# Patient Record
Sex: FEMALE | Race: WHITE | HISPANIC OR LATINO | Employment: OTHER | URBAN - METROPOLITAN AREA
[De-identification: names, ages, dates, MRNs, and addresses within clinical notes are randomized per-mention and may not be internally consistent; named-entity substitution may affect disease eponyms.]

---

## 2023-03-07 ENCOUNTER — APPOINTMENT (OUTPATIENT)
Dept: LAB | Facility: LAB | Age: 73
End: 2023-03-07
Payer: MEDICARE

## 2023-03-07 LAB
ALANINE AMINOTRANSFERASE (SGPT) (U/L) IN SER/PLAS: 17 U/L (ref 7–45)
ALBUMIN (G/DL) IN SER/PLAS: 4.6 G/DL (ref 3.4–5)
ALKALINE PHOSPHATASE (U/L) IN SER/PLAS: 63 U/L (ref 33–136)
ANION GAP IN SER/PLAS: 13 MMOL/L (ref 10–20)
ASPARTATE AMINOTRANSFERASE (SGOT) (U/L) IN SER/PLAS: 20 U/L (ref 9–39)
BILIRUBIN TOTAL (MG/DL) IN SER/PLAS: 0.5 MG/DL (ref 0–1.2)
CALCIDIOL (25 OH VITAMIN D3) (NG/ML) IN SER/PLAS: 46 NG/ML
CALCIUM (MG/DL) IN SER/PLAS: 9.2 MG/DL (ref 8.6–10.6)
CARBON DIOXIDE, TOTAL (MMOL/L) IN SER/PLAS: 30 MMOL/L (ref 21–32)
CHLORIDE (MMOL/L) IN SER/PLAS: 100 MMOL/L (ref 98–107)
CREATININE (MG/DL) IN SER/PLAS: 0.77 MG/DL (ref 0.5–1.05)
ERYTHROCYTE DISTRIBUTION WIDTH (RATIO) BY AUTOMATED COUNT: 13 % (ref 11.5–14.5)
ERYTHROCYTE MEAN CORPUSCULAR HEMOGLOBIN CONCENTRATION (G/DL) BY AUTOMATED: 33.8 G/DL (ref 32–36)
ERYTHROCYTE MEAN CORPUSCULAR VOLUME (FL) BY AUTOMATED COUNT: 99 FL (ref 80–100)
ERYTHROCYTES (10*6/UL) IN BLOOD BY AUTOMATED COUNT: 4.4 X10E12/L (ref 4–5.2)
GFR FEMALE: 81 ML/MIN/1.73M2
GLUCOSE (MG/DL) IN SER/PLAS: 115 MG/DL (ref 74–99)
HEMATOCRIT (%) IN BLOOD BY AUTOMATED COUNT: 43.5 % (ref 36–46)
HEMOGLOBIN (G/DL) IN BLOOD: 14.7 G/DL (ref 12–16)
LEUKOCYTES (10*3/UL) IN BLOOD BY AUTOMATED COUNT: 5.2 X10E9/L (ref 4.4–11.3)
NRBC (PER 100 WBCS) BY AUTOMATED COUNT: 0 /100 WBC (ref 0–0)
PARATHYRIN INTACT (PG/ML) IN SER/PLAS: 104.1 PG/ML (ref 18.5–88)
PLATELETS (10*3/UL) IN BLOOD AUTOMATED COUNT: 189 X10E9/L (ref 150–450)
POTASSIUM (MMOL/L) IN SER/PLAS: 4.7 MMOL/L (ref 3.5–5.3)
PROTEIN TOTAL: 6.2 G/DL (ref 6.4–8.2)
SODIUM (MMOL/L) IN SER/PLAS: 138 MMOL/L (ref 136–145)
THYROTROPIN (MIU/L) IN SER/PLAS BY DETECTION LIMIT <= 0.05 MIU/L: 2.18 MIU/L (ref 0.44–3.98)
UREA NITROGEN (MG/DL) IN SER/PLAS: 25 MG/DL (ref 6–23)

## 2023-03-10 LAB
CREATININE, URINE - PER VOLUME: 108 MG/DL
NTX, URINE (NM BCE/MM CREATININE): 32

## 2023-04-04 LAB
ALANINE AMINOTRANSFERASE (SGPT) (U/L) IN SER/PLAS: 20 U/L (ref 7–45)
ALBUMIN (G/DL) IN SER/PLAS: 4.5 G/DL (ref 3.4–5)
ALKALINE PHOSPHATASE (U/L) IN SER/PLAS: 65 U/L (ref 33–136)
ANION GAP IN SER/PLAS: 13 MMOL/L (ref 10–20)
ASPARTATE AMINOTRANSFERASE (SGOT) (U/L) IN SER/PLAS: 22 U/L (ref 9–39)
BILIRUBIN TOTAL (MG/DL) IN SER/PLAS: 0.5 MG/DL (ref 0–1.2)
CALCIDIOL (25 OH VITAMIN D3) (NG/ML) IN SER/PLAS: 52 NG/ML
CALCIUM (MG/DL) IN SER/PLAS: 9.5 MG/DL (ref 8.6–10.6)
CARBON DIOXIDE, TOTAL (MMOL/L) IN SER/PLAS: 29 MMOL/L (ref 21–32)
CHLORIDE (MMOL/L) IN SER/PLAS: 98 MMOL/L (ref 98–107)
CHOLESTEROL (MG/DL) IN SER/PLAS: 191 MG/DL (ref 0–199)
CHOLESTEROL IN HDL (MG/DL) IN SER/PLAS: 68 MG/DL
CHOLESTEROL/HDL RATIO: 2.8
CREATININE (MG/DL) IN SER/PLAS: 0.71 MG/DL (ref 0.5–1.05)
ERYTHROCYTE DISTRIBUTION WIDTH (RATIO) BY AUTOMATED COUNT: 13.2 % (ref 11.5–14.5)
ERYTHROCYTE MEAN CORPUSCULAR HEMOGLOBIN CONCENTRATION (G/DL) BY AUTOMATED: 33 G/DL (ref 32–36)
ERYTHROCYTE MEAN CORPUSCULAR VOLUME (FL) BY AUTOMATED COUNT: 101 FL (ref 80–100)
ERYTHROCYTES (10*6/UL) IN BLOOD BY AUTOMATED COUNT: 4.41 X10E12/L (ref 4–5.2)
GFR FEMALE: 90 ML/MIN/1.73M2
GLUCOSE (MG/DL) IN SER/PLAS: 80 MG/DL (ref 74–99)
HEMATOCRIT (%) IN BLOOD BY AUTOMATED COUNT: 44.5 % (ref 36–46)
HEMOGLOBIN (G/DL) IN BLOOD: 14.7 G/DL (ref 12–16)
LDL: 97 MG/DL (ref 0–99)
LEUKOCYTES (10*3/UL) IN BLOOD BY AUTOMATED COUNT: 6 X10E9/L (ref 4.4–11.3)
MAGNESIUM (MG/DL) IN SER/PLAS: 2.24 MG/DL (ref 1.6–2.4)
NRBC (PER 100 WBCS) BY AUTOMATED COUNT: 0 /100 WBC (ref 0–0)
PHOSPHATE (MG/DL) IN SER/PLAS: 3.6 MG/DL (ref 2.5–4.9)
PLATELETS (10*3/UL) IN BLOOD AUTOMATED COUNT: 208 X10E9/L (ref 150–450)
POTASSIUM (MMOL/L) IN SER/PLAS: 4.4 MMOL/L (ref 3.5–5.3)
PROTEIN TOTAL: 6.3 G/DL (ref 6.4–8.2)
SODIUM (MMOL/L) IN SER/PLAS: 136 MMOL/L (ref 136–145)
TRIGLYCERIDE (MG/DL) IN SER/PLAS: 132 MG/DL (ref 0–149)
UREA NITROGEN (MG/DL) IN SER/PLAS: 29 MG/DL (ref 6–23)
VLDL: 26 MG/DL (ref 0–40)

## 2023-04-05 LAB — PARATHYRIN INTACT (PG/ML) IN SER/PLAS: 74.8 PG/ML (ref 18.5–88)

## 2023-05-25 LAB
C REACTIVE PROTEIN (MG/L) IN SER/PLAS: <0.1 MG/DL
SEDIMENTATION RATE, ERYTHROCYTE: 3 MM/H (ref 0–30)

## 2023-06-06 ENCOUNTER — HOSPITAL ENCOUNTER (OUTPATIENT)
Dept: DATA CONVERSION | Facility: HOSPITAL | Age: 73
End: 2023-06-06
Attending: OPHTHALMOLOGY | Admitting: OPHTHALMOLOGY
Payer: MEDICARE

## 2023-06-06 DIAGNOSIS — J90 PLEURAL EFFUSION, NOT ELSEWHERE CLASSIFIED: ICD-10-CM

## 2023-06-06 DIAGNOSIS — Z79.82 LONG TERM (CURRENT) USE OF ASPIRIN: ICD-10-CM

## 2023-06-06 DIAGNOSIS — I48.92 UNSPECIFIED ATRIAL FLUTTER (MULTI): ICD-10-CM

## 2023-06-06 DIAGNOSIS — Z79.52 LONG TERM (CURRENT) USE OF SYSTEMIC STEROIDS: ICD-10-CM

## 2023-06-06 DIAGNOSIS — I48.0 PAROXYSMAL ATRIAL FIBRILLATION (MULTI): ICD-10-CM

## 2023-06-06 DIAGNOSIS — I95.9 HYPOTENSION, UNSPECIFIED: ICD-10-CM

## 2023-06-06 DIAGNOSIS — R51.9 HEADACHE, UNSPECIFIED: ICD-10-CM

## 2023-06-06 DIAGNOSIS — M81.0 AGE-RELATED OSTEOPOROSIS WITHOUT CURRENT PATHOLOGICAL FRACTURE: ICD-10-CM

## 2023-06-06 DIAGNOSIS — R70.0 ELEVATED ERYTHROCYTE SEDIMENTATION RATE: ICD-10-CM

## 2023-06-08 LAB
COMPLETE PATHOLOGY REPORT: NORMAL
CONVERTED CLINICAL DIAGNOSIS-HISTORY: NORMAL
CONVERTED FINAL DIAGNOSIS: NORMAL
CONVERTED FINAL REPORT PDF LINK TO COPY AND PASTE: NORMAL
CONVERTED GROSS DESCRIPTION: NORMAL

## 2023-08-25 LAB
C REACTIVE PROTEIN (MG/L) IN SER/PLAS: <0.1 MG/DL
CALCIUM (MG/DL) IN SER/PLAS: 9 MG/DL (ref 8.6–10.6)
SEDIMENTATION RATE, ERYTHROCYTE: <1 MM/H (ref 0–30)

## 2023-09-07 VITALS
DIASTOLIC BLOOD PRESSURE: 70 MMHG | RESPIRATION RATE: 16 BRPM | SYSTOLIC BLOOD PRESSURE: 116 MMHG | HEART RATE: 75 BPM | TEMPERATURE: 98.2 F

## 2023-09-09 PROBLEM — E87.1 HYPONATREMIA: Status: ACTIVE | Noted: 2023-09-09

## 2023-09-09 PROBLEM — I48.91 A-FIB (MULTI): Status: ACTIVE | Noted: 2023-09-09

## 2023-09-09 PROBLEM — D47.3 ESSENTIAL (HEMORRHAGIC) THROMBOCYTHEMIA (MULTI): Status: ACTIVE | Noted: 2023-09-09

## 2023-09-09 PROBLEM — H92.01 OTALGIA, RIGHT: Status: ACTIVE | Noted: 2023-09-09

## 2023-09-09 PROBLEM — H92.09 OTALGIA: Status: ACTIVE | Noted: 2023-09-09

## 2023-09-09 PROBLEM — I48.92 ATRIAL FLUTTER (MULTI): Status: ACTIVE | Noted: 2023-09-09

## 2023-09-09 PROBLEM — I48.0 PAROXYSMAL ATRIAL FIBRILLATION (MULTI): Status: ACTIVE | Noted: 2023-09-09

## 2023-09-09 PROBLEM — K60.2 ANAL FISSURE: Status: ACTIVE | Noted: 2023-09-09

## 2023-09-09 PROBLEM — M99.05 SEGMENTAL AND SOMATIC DYSFUNCTION OF PELVIC REGION: Status: ACTIVE | Noted: 2023-09-09

## 2023-09-09 PROBLEM — S82.002A FRACTURE OF LEFT PATELLA: Status: ACTIVE | Noted: 2023-09-09

## 2023-09-09 PROBLEM — R91.1 PULMONARY NODULE: Status: ACTIVE | Noted: 2023-09-09

## 2023-09-09 PROBLEM — M54.59 MECHANICAL LOW BACK PAIN: Status: ACTIVE | Noted: 2023-09-09

## 2023-09-09 PROBLEM — R71.8 ELEVATED MCV: Status: ACTIVE | Noted: 2023-09-09

## 2023-09-09 PROBLEM — M81.0 OSTEOPOROSIS WITHOUT CURRENT PATHOLOGICAL FRACTURE: Status: ACTIVE | Noted: 2023-09-09

## 2023-09-09 PROBLEM — Z04.9 CONDITION NOT FOUND: Status: ACTIVE | Noted: 2023-09-09

## 2023-09-09 PROBLEM — T63.441A ALLERGIC REACTION TO BEE STING: Status: ACTIVE | Noted: 2023-09-09

## 2023-09-09 PROBLEM — S61.213A LACERATION OF LEFT MIDDLE FINGER WITHOUT FOREIGN BODY WITHOUT DAMAGE TO NAIL: Status: ACTIVE | Noted: 2023-09-09

## 2023-09-09 PROBLEM — R13.14 PHARYNGOESOPHAGEAL DYSPHAGIA: Status: ACTIVE | Noted: 2023-09-09

## 2023-09-09 PROBLEM — M19.90 ARTHRITIS: Status: ACTIVE | Noted: 2023-09-09

## 2023-09-09 PROBLEM — K21.9 ESOPHAGEAL REFLUX: Status: ACTIVE | Noted: 2023-09-09

## 2023-09-09 PROBLEM — R00.2 HEART PALPITATIONS: Status: ACTIVE | Noted: 2023-09-09

## 2023-09-09 PROBLEM — M15.9 OSTEOARTHRITIS, MULTIPLE SITES: Status: ACTIVE | Noted: 2023-09-09

## 2023-09-09 PROBLEM — R19.5 DARK STOOLS: Status: ACTIVE | Noted: 2023-09-09

## 2023-09-09 PROBLEM — J45.909 ALLERGIC BRONCHITIS (HHS-HCC): Status: ACTIVE | Noted: 2023-09-09

## 2023-09-09 PROBLEM — R63.4 WEIGHT LOSS: Status: ACTIVE | Noted: 2023-09-09

## 2023-09-09 PROBLEM — M41.9 SCOLIOSIS: Status: ACTIVE | Noted: 2023-09-09

## 2023-09-09 PROBLEM — M16.12 PRIMARY LOCALIZED OSTEOARTHRITIS OF LEFT HIP: Status: ACTIVE | Noted: 2023-09-09

## 2023-09-09 PROBLEM — Z95.9 PRESENCE OF CARDIAC AND VASCULAR IMPLANT AND GRAFT: Status: ACTIVE | Noted: 2023-09-09

## 2023-09-09 PROBLEM — K66.1 PERITONEAL HEMATOMA: Status: ACTIVE | Noted: 2023-09-09

## 2023-09-09 PROBLEM — E78.1 HIGH TRIGLYCERIDES: Status: ACTIVE | Noted: 2023-09-09

## 2023-09-09 PROBLEM — R74.8 ELEVATED LIVER ENZYMES: Status: ACTIVE | Noted: 2023-09-09

## 2023-09-09 PROBLEM — R53.83 FATIGUE: Status: ACTIVE | Noted: 2023-09-09

## 2023-09-09 PROBLEM — R09.89 ABNORMAL FINDING OF LUNG: Status: ACTIVE | Noted: 2023-09-09

## 2023-09-09 PROBLEM — R79.89 ABNORMAL LIVER FUNCTION TEST: Status: ACTIVE | Noted: 2023-09-09

## 2023-09-09 PROBLEM — I74.10 AORTIC THROMBUS (MULTI): Status: ACTIVE | Noted: 2023-09-09

## 2023-09-09 PROBLEM — N95.1 VAGINAL DRYNESS, MENOPAUSAL: Status: ACTIVE | Noted: 2023-09-09

## 2023-09-09 PROBLEM — I47.20 VENTRICULAR TACHYCARDIA (MULTI): Status: ACTIVE | Noted: 2023-09-09

## 2023-09-09 PROBLEM — R82.90 URINE ABNORMALITY: Status: ACTIVE | Noted: 2023-09-09

## 2023-09-09 PROBLEM — R79.89 LOW VITAMIN D LEVEL: Status: ACTIVE | Noted: 2023-09-09

## 2023-09-09 PROBLEM — T63.461A WASP STING: Status: ACTIVE | Noted: 2023-09-09

## 2023-09-09 PROBLEM — N95.9 MENOPAUSAL AND POSTMENOPAUSAL DISORDER: Status: ACTIVE | Noted: 2023-09-09

## 2023-09-09 PROBLEM — L50.0 ALLERGIC URTICARIA: Status: ACTIVE | Noted: 2023-09-09

## 2023-09-09 PROBLEM — K08.9 DENTAL DISORDER: Status: ACTIVE | Noted: 2023-09-09

## 2023-09-09 PROBLEM — E78.00 ELEVATED LDL CHOLESTEROL LEVEL: Status: ACTIVE | Noted: 2023-09-09

## 2023-09-09 PROBLEM — N28.9 ABNORMAL RENAL FUNCTION: Status: ACTIVE | Noted: 2023-09-09

## 2023-09-09 RX ORDER — MULTIVITAMIN/IRON/FOLIC ACID 18MG-0.4MG
1 TABLET ORAL DAILY
COMMUNITY
Start: 2020-09-16

## 2023-09-09 RX ORDER — MUPIROCIN 20 MG/G
OINTMENT TOPICAL
COMMUNITY
Start: 2023-03-29

## 2023-09-09 RX ORDER — FLUTICASONE PROPIONATE 50 MCG
2 SPRAY, SUSPENSION (ML) NASAL DAILY
COMMUNITY
Start: 2018-02-07

## 2023-09-09 RX ORDER — ESTRADIOL 0.1 MG/G
CREAM VAGINAL
COMMUNITY
Start: 2022-08-23

## 2023-09-09 RX ORDER — PREDNISONE 2.5 MG/1
3 TABLET ORAL DAILY
COMMUNITY
End: 2023-10-13 | Stop reason: ALTCHOICE

## 2023-09-09 RX ORDER — CALCIUM CARBONATE 600 MG
1 TABLET ORAL DAILY
COMMUNITY
Start: 2017-04-26

## 2023-09-09 RX ORDER — FLUTICASONE PROPIONATE 0.5 MG/G
CREAM TOPICAL AS NEEDED
COMMUNITY
Start: 2021-07-27

## 2023-09-09 RX ORDER — NITROGLYCERIN 0.4 MG/1
0.4 TABLET SUBLINGUAL EVERY 5 MIN PRN
COMMUNITY

## 2023-09-09 RX ORDER — PREDNISONE 10 MG/1
6 TABLET ORAL
COMMUNITY
Start: 2023-06-15 | End: 2023-10-13 | Stop reason: ALTCHOICE

## 2023-09-09 RX ORDER — CHOLECALCIFEROL (VITAMIN D3) 25 MCG
TABLET ORAL
COMMUNITY
Start: 2017-04-26

## 2023-09-09 RX ORDER — ASPIRIN 81 MG/1
1 TABLET ORAL DAILY
COMMUNITY
Start: 2017-04-26

## 2023-09-30 NOTE — H&P
History of Present Illness:   History Present Illness:  Reason for surgery: Rule-Out GCA   HPI:    Patient is a 73 year old female presenting for temporal artery biopsy of both sides for giant cell arteritis rule out.     Allergies:        Allergies:  ·  NKDA :   ·  Bee  Stings: Unknown       Adverse Events:  ·  NSAIDs : Unknown    Home Medication Review:   Home Medications Reviewed: yes     Impression/Procedure:   ·  Impression and Planned Procedure: Bilateral temporal artery biopsy       ERAS (Enhanced Recovery After Surgery):  ·  ERAS Patient: no       Vital Signs:  Temperature C: 36.8 degrees C   Temperature F: 98.2 degrees F   Heart Rate: 75 beats per minute   Respiratory Rate: 16 breath per minute   Blood Pressure Systolic: 116 mm/Hg   Blood Pressure Diastolic: 70 mm/Hg     Physical Exam by System:    Constitutional: Well developed, awake/alert/oriented  x3, no distress, alert and cooperative   Eyes: PERRL, clear sclera   Head/Neck: Neck supple, trachea midline, no bruits   Respiratory/Thorax: Patent airways, CTAB, normal  breath sounds with good chest expansion, thorax symmetric   Cardiovascular: Regular, rate and rhythm   Gastrointestinal: Nondistended, soft   Extremities: normal extremities   Neurological: alert and oriented x3   Psychological: Appropriate mood and behavior   Skin: Warm and dry     Consent:   COVID-19 Consent:  ·  COVID-19 Risk Consent Surgeon has reviewed key risks related to the risk of alan COVID-19 and if they contract COVID-19 what the risks are.     Attestation:   Note Completion:  I am a:  Resident/Fellow   Attending Attestation I saw and evaluated the patient.  I personally obtained the key and critical portions of the history and physical exam or was physically present for key and  critical portions performed by the resident/fellow. I reviewed the resident/fellow?s documentation and discussed the patient with the resident/fellow.  I agree with the resident/fellow?s  medical decision making as documented in the note.     I personally evaluated the patient on 06-Jun-2023         Electronic Signatures:  Ramez Walton)  (Signed 06-Jun-2023 08:08)   Authored: Note Completion   Co-Signer: History of Present Illness, Allergies, Home Medication Review, Impression/Procedure, ERAS, Physical Exam, Consent, Note Completion  Min Veronica (Resident))  (Signed 06-Jun-2023 07:05)   Authored: History of Present Illness, Allergies, Home  Medication Review, Impression/Procedure, ERAS, Physical Exam, Consent, Note Completion      Last Updated: 06-Jun-2023 08:08 by Ramez Walton)

## 2023-10-02 NOTE — OP NOTE
Post Operative Note:     PreOp Diagnosis: Headache, elevated ESR   Post-Procedure Diagnosis: Headache, elevated ESR   Procedure: 1. Right temporal artery biopsy   Surgeon: Ramez Walton MD   Resident/Fellow/Other Assistant: Min Winter MD   Anesthesia: MAC   Estimated Blood Loss (mL): 2   Specimen: yes. R temporal artery   Complications: None   Findings: Right temporal artery     Operative Report Dictated:  Dictation: not applicable - note contains Operative  Report   Operative Report:    The patient was taken to the operating room and placed on the table in the supine position. A doppler was used to plot the path of the  temporal artery. This was marked. The surrounding hair was shaved. MAC anesthesia was induced. 2% lidocaine with epinephrine and 0.5% marcaine in a 1:1 fashion was injected over the surgical site and the patient was prepped and draped in the usual manner  for orbitofacial surgery.     A 15 Bard Gaurang blade incision was made through the skin overlying the Right temporal artery. Sharp dissection was carried down to  the temporal artery, and the temporal artery was bluntly undermined superiorly and inferiorly. The artery was ligated with 4-0 silk sutures superiorly and inferiorly along with any branches of the vessel. A  2.8 cm strip of artery was excised with sharp dissection. The incision was closed with 5-0 Mono cryl sutures deeply and 5-0 chromic gut sutures superficially.  Antibiotic ophthalmic ointment was placed over the surgical site.     The patient was then awakened and taken from the operating room in good condition, having tolerated the procedure well. There were no complications, and the estimated blood loss was less than 5mL          Attestation:   Note Completion:  Attending Attestation I was present for the entire procedure         Electronic Signatures:  Ramez Walton)  (Signed 20-Jun-2023 09:03)   Authored: Post Operative Note, Note Completion      Last Updated: 20-Jun-2023  09:03 by Ramez Walton)

## 2023-10-06 ENCOUNTER — HOSPITAL ENCOUNTER (OUTPATIENT)
Dept: CARDIOLOGY | Facility: CLINIC | Age: 73
Discharge: HOME | End: 2023-10-06
Payer: MEDICARE

## 2023-10-06 DIAGNOSIS — I48.19 PERSISTENT ATRIAL FIBRILLATION (MULTI): ICD-10-CM

## 2023-10-12 NOTE — PROGRESS NOTES
"I had the pleasure seeing Javier Calderón     Chief Complaint   Patient presents with    Atrial Flutter     She is here for a 1 yr follow-up.          Javier Calderón is a 73 y.o. with:     1. Chronic chest pain - presented in February of 2018 at [facility] for chest pain work up. He stress echo was negative. However, prior to the stress test she developed episode of atrial fibrillation/flutter that was short lived  2. Long standing history of palpitations - s/p loop recorder 02/09/2018     Since the loop recorder implant she did have some palpations and initially the transmissions have not shown any abnormal rhythm.      However, recently she had very severe symptoms of sweating, palpitations, impending doom (\"I can not live like that\"). Loop recorder shows they are correlating with AF. She was in [place] and had 4 hour long episode of AF.      On 06/12/2018 she had PVI. During the procedure she became hypotensive. There was no significant pericardial effusion (she had small one before the procedure started), nor groin swelling. She was transfused empirically because her hypotension persisted. 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 She did required transfusion of a significant amount of blood.      July 2019: She has no more AF.  We will continue to monitor her with the loop recorder.      Sept 2022:  loop recorder. Replaced (May 2021).  No episodes of atrial fibrillation.     TESTING         -Echo:  (June 2018) LVEF 60-65%.  Normal function    Objective   Physical Exam  BP 91/55 (BP Location: Left arm, Patient Position: Sitting)   Pulse 72   Ht 1.626 m (5' 4\")   Wt 51.7 kg (114 lb)   SpO2 98%   BMI 19.57 kg/m²     General Appearance:  Alert, cooperative, no distress, appears stated age   Head:  Normocephalic, without obvious abnormality, atraumatic   Eyes:  PERRL, conjunctiva/corneas " clear, EOM's intact, fundi benign, both eyes   Ears:  Normal TM's and external ear canals, both ears   Nose: Nares normal, septum midline,mucosa normal, no drainage or sinus tenderness   Throat: Lips, mucosa, and tongue normal; teeth and gums normal   Neck: Supple, symmetrical, trachea midline, no adenopathy;  thyroid: not enlarged, symmetric, no tenderness/mass/nodules; no carotid bruit or JVD   Back:   Symmetric, no curvature, ROM normal, no CVA tenderness   Lungs:   Clear to auscultation bilaterally, respirations unlabored   Breasts:  No masses or tenderness   Heart:  Regular rate and rhythm, S1 and S2 normal, no murmur, rub, or gallop   Abdomen:   Soft, non-tender, bowel sounds active all four quadrants,  no masses, no organomegaly   Pelvic: Deferred   Extremities: Extremities normal, atraumatic, no cyanosis or edema   Pulses: 2+ and symmetric   Skin: Skin color, texture, turgor normal, no rashes or lesions   Lymph nodes: Cervical, supraclavicular, and axillary nodes normal   Neurologic: Normal          Assessment/Plan   Paroxysmal atrial fibrillation (CMS/HCC)  She is doing very well. Has no more atrial fibrillation. Loop recorder also shows no more episodes of atrial fibrillation. We will continue to monitor through the loop recorder and change the loop recorder when the battery expires.

## 2023-10-13 ENCOUNTER — OFFICE VISIT (OUTPATIENT)
Dept: CARDIOLOGY | Facility: HOSPITAL | Age: 73
End: 2023-10-13
Payer: MEDICARE

## 2023-10-13 VITALS
HEART RATE: 72 BPM | BODY MASS INDEX: 19.46 KG/M2 | SYSTOLIC BLOOD PRESSURE: 91 MMHG | OXYGEN SATURATION: 98 % | HEIGHT: 64 IN | WEIGHT: 114 LBS | DIASTOLIC BLOOD PRESSURE: 55 MMHG

## 2023-10-13 DIAGNOSIS — I48.4 ATYPICAL ATRIAL FLUTTER (MULTI): ICD-10-CM

## 2023-10-13 PROCEDURE — 93005 ELECTROCARDIOGRAM TRACING: CPT | Performed by: INTERNAL MEDICINE

## 2023-10-13 PROCEDURE — 99213 OFFICE O/P EST LOW 20 MIN: CPT | Performed by: INTERNAL MEDICINE

## 2023-10-13 PROCEDURE — 1126F AMNT PAIN NOTED NONE PRSNT: CPT | Performed by: INTERNAL MEDICINE

## 2023-10-13 PROCEDURE — 1159F MED LIST DOCD IN RCRD: CPT | Performed by: INTERNAL MEDICINE

## 2023-10-13 ASSESSMENT — ENCOUNTER SYMPTOMS
OCCASIONAL FEELINGS OF UNSTEADINESS: 0
DEPRESSION: 0
LOSS OF SENSATION IN FEET: 0

## 2023-10-13 ASSESSMENT — COLUMBIA-SUICIDE SEVERITY RATING SCALE - C-SSRS
6. HAVE YOU EVER DONE ANYTHING, STARTED TO DO ANYTHING, OR PREPARED TO DO ANYTHING TO END YOUR LIFE?: NO
2. HAVE YOU ACTUALLY HAD ANY THOUGHTS OF KILLING YOURSELF?: NO
1. IN THE PAST MONTH, HAVE YOU WISHED YOU WERE DEAD OR WISHED YOU COULD GO TO SLEEP AND NOT WAKE UP?: NO

## 2023-10-13 ASSESSMENT — PATIENT HEALTH QUESTIONNAIRE - PHQ9
2. FEELING DOWN, DEPRESSED OR HOPELESS: NOT AT ALL
1. LITTLE INTEREST OR PLEASURE IN DOING THINGS: NOT AT ALL
SUM OF ALL RESPONSES TO PHQ9 QUESTIONS 1 AND 2: 0

## 2023-10-13 NOTE — ASSESSMENT & PLAN NOTE
She is doing very well. Has no more atrial fibrillation. Loop recorder also shows no more episodes of atrial fibrillation. We will continue to monitor through the loop recorder and change the loop recorder when the battery expires.

## 2023-10-17 ENCOUNTER — APPOINTMENT (OUTPATIENT)
Dept: UROLOGY | Facility: CLINIC | Age: 73
End: 2023-10-17
Payer: MEDICARE

## 2023-10-18 ENCOUNTER — TELEPHONE (OUTPATIENT)
Dept: GASTROENTEROLOGY | Facility: CLINIC | Age: 73
End: 2023-10-18
Payer: MEDICARE

## 2023-10-18 NOTE — TELEPHONE ENCOUNTER
Pt had previous colonoscopy she believes at 65.  No record with the clinic. She believes she is due. Please advise if I should schedule her.

## 2023-10-20 ENCOUNTER — HOSPITAL ENCOUNTER (OUTPATIENT)
Dept: CARDIOLOGY | Facility: CLINIC | Age: 73
Discharge: HOME | End: 2023-10-20
Payer: MEDICARE

## 2023-10-20 ENCOUNTER — TELEPHONE (OUTPATIENT)
Dept: CARDIOLOGY | Facility: HOSPITAL | Age: 73
End: 2023-10-20
Payer: MEDICARE

## 2023-10-20 DIAGNOSIS — I48.19 PERSISTENT ATRIAL FIBRILLATION (MULTI): ICD-10-CM

## 2023-10-20 NOTE — TELEPHONE ENCOUNTER
"Message History      Sent: 9/15/2023 9:22:52 AM Eastern Standard Time   Sender: Erick Prado MD   Subject: RE: Charles Pablo.   1950   Body: Good morning Charles,       This is the recommendation:    If chest pain occurs, take 1 sublingual nitro,    Wait 5 minutes,    If pain has not resolved take another nitro and call 911,    Wait 5 minutes,    If pain has not resolved you make take a third (final) nitro.       We would like to know if you are experiencing chest pain, even if it is easily relieved by nitro.    If you have any additional questions feel free to call me at [phone]         Have a nice day,    Thompson Car R.N.      Sent: 2023 2:08:13 PM Eastern Standard Time   Sender: CHARLES PABLO   Subject: RE: Stephane Charles.   1950   Body: Good day Thank you for your time yesterday. I have a question about the protocol if I have chest pain. Do you prefer I take  a nitroglycerin and wait five minutes and if I'm still in pain take one more nitroglycerin and wait five minutes and if I'm still in pain call 911 or  call 911 stat also taking nitroglycerin and an aspirin?  Sorry I didn't think of this while I was with you.         Call 10/20/23  Patient left message reporting that she experienced a\"stabby\"  chest pain last night that was relieved with an ASA and sublingual nitroglycerin. She states the pain was resolved within 5 minutes.     Per Dr. Prado,   Determine if nature of the pain is cardiac, instruct that if pain does not resolve within 5 minutes after taking a nitroglycerin, she should call EMS.     Unable to contact patient. Message left requesting return call and providing instructions on what to do over the weekend should chest pain occur again. Encouraged patient to call us Monday to update on status.     "

## 2023-10-23 ENCOUNTER — HOSPITAL ENCOUNTER (OUTPATIENT)
Dept: CARDIOLOGY | Facility: CLINIC | Age: 73
Discharge: HOME | End: 2023-10-23
Payer: MEDICARE

## 2023-10-23 DIAGNOSIS — I48.19 PERSISTENT ATRIAL FIBRILLATION (MULTI): ICD-10-CM

## 2023-10-24 DIAGNOSIS — Z12.11 SPECIAL SCREENING FOR MALIGNANT NEOPLASMS, COLON: ICD-10-CM

## 2023-10-24 RX ORDER — POLYETHYLENE GLYCOL 3350, SODIUM SULFATE ANHYDROUS, SODIUM BICARBONATE, SODIUM CHLORIDE, POTASSIUM CHLORIDE 236; 22.74; 6.74; 5.86; 2.97 G/4L; G/4L; G/4L; G/4L; G/4L
POWDER, FOR SOLUTION ORAL
Qty: 4000 ML | Refills: 0 | Status: SHIPPED | OUTPATIENT
Start: 2023-10-24 | End: 2023-12-19 | Stop reason: ALTCHOICE

## 2023-10-25 ENCOUNTER — HOSPITAL ENCOUNTER (OUTPATIENT)
Dept: CARDIOLOGY | Facility: CLINIC | Age: 73
Discharge: HOME | End: 2023-10-25
Payer: MEDICARE

## 2023-10-25 DIAGNOSIS — I48.19 ATRIAL FIBRILLATION, PERSISTENT (MULTI): ICD-10-CM

## 2023-10-25 PROCEDURE — 93298 REM INTERROG DEV EVAL SCRMS: CPT | Performed by: INTERNAL MEDICINE

## 2023-11-03 ENCOUNTER — HOSPITAL ENCOUNTER (OUTPATIENT)
Dept: CARDIOLOGY | Facility: CLINIC | Age: 73
Discharge: HOME | End: 2023-11-03
Payer: MEDICARE

## 2023-11-03 DIAGNOSIS — I48.19 ATRIAL FIBRILLATION, PERSISTENT (MULTI): ICD-10-CM

## 2023-11-10 ENCOUNTER — OFFICE VISIT (OUTPATIENT)
Dept: RHEUMATOLOGY | Facility: CLINIC | Age: 73
End: 2023-11-10
Payer: MEDICARE

## 2023-11-10 VITALS
WEIGHT: 114 LBS | DIASTOLIC BLOOD PRESSURE: 64 MMHG | OXYGEN SATURATION: 98 % | SYSTOLIC BLOOD PRESSURE: 100 MMHG | HEART RATE: 80 BPM | BODY MASS INDEX: 19.46 KG/M2 | HEIGHT: 64 IN

## 2023-11-10 DIAGNOSIS — M19.90 ARTHRITIS: ICD-10-CM

## 2023-11-10 DIAGNOSIS — M81.0 AGE-RELATED OSTEOPOROSIS WITHOUT CURRENT PATHOLOGICAL FRACTURE: Primary | ICD-10-CM

## 2023-11-10 PROCEDURE — 1159F MED LIST DOCD IN RCRD: CPT | Performed by: INTERNAL MEDICINE

## 2023-11-10 PROCEDURE — 99213 OFFICE O/P EST LOW 20 MIN: CPT | Performed by: INTERNAL MEDICINE

## 2023-11-10 PROCEDURE — 1036F TOBACCO NON-USER: CPT | Performed by: INTERNAL MEDICINE

## 2023-11-10 PROCEDURE — 1126F AMNT PAIN NOTED NONE PRSNT: CPT | Performed by: INTERNAL MEDICINE

## 2023-11-10 ASSESSMENT — COLUMBIA-SUICIDE SEVERITY RATING SCALE - C-SSRS
6. HAVE YOU EVER DONE ANYTHING, STARTED TO DO ANYTHING, OR PREPARED TO DO ANYTHING TO END YOUR LIFE?: NO
1. IN THE PAST MONTH, HAVE YOU WISHED YOU WERE DEAD OR WISHED YOU COULD GO TO SLEEP AND NOT WAKE UP?: NO
2. HAVE YOU ACTUALLY HAD ANY THOUGHTS OF KILLING YOURSELF?: NO

## 2023-11-10 ASSESSMENT — PATIENT HEALTH QUESTIONNAIRE - PHQ9
SUM OF ALL RESPONSES TO PHQ9 QUESTIONS 1 AND 2: 0
1. LITTLE INTEREST OR PLEASURE IN DOING THINGS: NOT AT ALL
2. FEELING DOWN, DEPRESSED OR HOPELESS: NOT AT ALL

## 2023-11-10 NOTE — PATIENT INSTRUCTIONS
Continue calcium and vitamin D.  Schedule DEXA scan in January 2024.  Follow-up after the DEXA scan.

## 2023-11-10 NOTE — PROGRESS NOTES
"Subjective   Patient ID: Javier Calderón is a 73 y.o. female who presents for Follow-up (Pt is here for F/U).    HPI.  73-year-old female with history of osteoporosis, OA, C-spine DJD with foraminal stenosis and A-fib s/p ablation presented for follow-up.    She reports normal neck pain.  Months antibiotic feels headache.  Prednisone tapered to discontinued about 3 weeks ago.      No recent fall or fractures.  Take calcium and vitamin D.    Past immunosuppression: Prednisone.     -Right temporal artery biopsy obtained on June 6 is negative.     -Past antiresorptive therapy: Reclast, 3/2018, 4/2019 and 2/ 2020.     DEXA scan obtained on January 25, 2022 at [facility] showed L1-L4 T score of -1.3, decreased from -1.1, right femoral neck T score of -2.5 dropped from -2.4 and right total hip T score of -2.2 which is stable.       Review of Systems   All other systems reviewed and are negative.    Objective .      5/9/2023    10:51 AM 5/23/2023    11:04 AM 6/6/2023     7:04 AM 6/16/2023     2:19 PM 7/11/2023    10:47 AM 10/13/2023    10:58 AM 11/10/2023    10:14 AM   Vitals   Systolic  113 116 117 115 91 100   Diastolic  62 70 62 62 55 64   Heart Rate  79 75   72 80   Temp   36.8 °C (98.2 °F)       Resp  16 16       Height (in) 1.626 m (5' 4\") 1.626 m (5' 4\")  1.626 m (5' 4\")  1.626 m (5' 4\") 1.626 m (5' 4\")   Weight (lb) 112 112   111 114 114   BMI 19.22 kg/m2 19.22 kg/m2  19.22 kg/m2 19.05 kg/m2 19.57 kg/m2 19.57 kg/m2   BSA (m2) 1.51 m2 1.51 m2  1.51 m2 1.51 m2 1.53 m2 1.53 m2   Visit Report      Report Report      Physical Exam.  Gen. AAO x3, NAD.  HEENT: No pallor or icterus, PERRLA, EOMI. No cervical lymphadenopathy .  Skin: No rashes.  Heart: S1, S2/ RRR.   Lungs: CTA B.  Abdomen: Soft, NT/ND.  MSK: No.swelling or tenderness of the  upper or lower extremity joints with full ROM, Neck,spine and Navi SI with out tenderness.  Neuro: Sensation to touch intact.Strength 5/5 throughout.   Psych:Appropriate mood " and behavior  EXT: No edema    Assessment/Plan .  73-year-old female with history of osteoporosis, OA, C-spine DJD with foraminal stenosis and A-fib s/p ablation presented for follow-up.    #1: Cervical spine DJD with foraminal stenosis.  Initially it was thought she may have GCA and treated with prednisone.  However right temporal artery was negative.  MRI showed cervical spine degenerative changes and foraminal stenosis.  Symptoms improved with neck traction.  She is off the prednisone and feeling fine.    #2: Postmenopausal osteoporosis.  Previously treated with Reclast x3.  She has completed 4 Prolia injections.  Last injection given in September 2023.  -Obtain DEXA scan in January 2024  -Continue calcium and vitamin D.    Follow-up in 3 months.     This note was partially generated using the Dragon Voice recognition system. There may be some incorrect wording, spelling and/or spelling errors or punctuation errors that were not corrected prior to committing the note to the medical record.    Patient Active Problem List   Diagnosis    Abnormal finding of lung    Abnormal renal function    Allergic bronchitis    Allergic reaction to bee sting    Allergic urticaria    Anal fissure    Aortic thrombus (CMS/HCC)    Arthritis    A-fib (CMS/HCC)    Atrial flutter (CMS/HCC)    Paroxysmal atrial fibrillation (CMS/HCC)    Dark stools    Dental disorder    Elevated LDL cholesterol level    Elevated liver enzymes    Elevated MCV    Esophageal reflux    Essential (hemorrhagic) thrombocythemia (CMS/HCC)    Fatigue    Fracture of left patella    Heart palpitations    High triglycerides    Hyponatremia    Laceration of left middle finger without foreign body without damage to nail    Abnormal liver function test    Low vitamin D level    Mechanical low back pain    Menopausal and postmenopausal disorder    Osteoarthritis, multiple sites    Osteoporosis without current pathological fracture    Otalgia, right    Otalgia     Peritoneal hematoma    Pharyngoesophageal dysphagia    Presence of cardiac and vascular implant and graft    Primary localized osteoarthritis of left hip    Pulmonary nodule    Scoliosis    Segmental and somatic dysfunction of pelvic region    Urine abnormality    Vaginal dryness, menopausal    Ventricular tachycardia (CMS/HCC)    Wasp sting    Weight loss    Condition not found      Past Surgical History:   Procedure Laterality Date    CATARACT EXTRACTION  01/08/2018    Cataract Surgery    CT ABDOMEN PELVIS ANGIOGRAM W AND/OR WO IV CONTRAST  6/12/2018    CT ABDOMEN PELVIS ANGIOGRAM W AND/OR WO IV CONTRAST 6/12/2018 [facility]    CT ABDOMEN PELVIS ANGIOGRAM W AND/OR WO IV CONTRAST  6/14/2018    CT ABDOMEN PELVIS ANGIOGRAM W AND/OR WO IV CONTRAST 6/14/2018 [facility]    IR ANGIOGRAM INFERIOR EPIGASTRIC PELVIC  6/14/2018    IR ANGIOGRAM INFERIOR EPIGASTRIC PELVIC 6/14/2018 [facility]    IR EMBOLIZATION LYMPH NODE Bilateral 6/14/2018    IR EMBOLIZATION LYMPH NODE 6/14/2018 [facility]    IR EMBOLIZATION LYMPH NODE Bilateral 6/14/2018    IR EMBOLIZATION LYMPH NODE 6/14/2018 [facility]    OTHER SURGICAL HISTORY  03/07/2018    Programming And Iterative Adjustment Of Implantable Loop Recorder    OTHER SURGICAL HISTORY  11/08/2022    Esophagogastroduodenoscopy      Social History     Tobacco Use    Smoking status: Never    Smokeless tobacco: Never   Substance Use Topics    Alcohol use: Never      Allergies   Allergen Reactions    Bee Venom Protein (Honey Bee) Unknown      Current Outpatient Medications   Medication Instructions    aspirin 81 mg EC tablet 1 tablet, oral, Daily    b complex 0.4 mg tablet 1 tablet, oral, Daily    calcium carbonate 600 mg calcium (1,500 mg) tablet 1 tablet, oral, Daily    cholecalciferol (Vitamin D-3) 25 MCG (1000 UT) tablet Take as directed.    estradiol (Estrace) 0.01 % (0.1 mg/gram) vaginal cream vaginal, Apply a pea sized amount of vaginal area daily x2 weeks, then three  times a week as needed<BR>    fluticasone (Cutivate) 0.05 % cream Topical, As needed, Apply inch.    fluticasone (Flonase) 50 mcg/actuation nasal spray 2 sprays, Each Nostril, Daily    MULTIVITAMIN ORAL 1 capsule, oral, Daily    mupirocin (Bactroban) 2 % ointment Topical, 3 times daily RT, Apply ointment topically to affected area.    nitroglycerin (NITROSTAT) 0.4 mg, sublingual, Every 5 min PRN, For up to 3 doses as needed. Call 911 if pain persists.    polyethylene glycol-electrolytes (Golytely) 420 gram solution Begin drinking first half of prep 6pm the night before procedure. Start second half 5 hours before procedure time.

## 2023-11-14 ENCOUNTER — LAB (OUTPATIENT)
Dept: LAB | Facility: LAB | Age: 73
End: 2023-11-14
Payer: MEDICARE

## 2023-11-14 DIAGNOSIS — I48.0 PAROXYSMAL ATRIAL FIBRILLATION (MULTI): ICD-10-CM

## 2023-11-14 DIAGNOSIS — M19.90 ARTHRITIS: ICD-10-CM

## 2023-11-14 DIAGNOSIS — E78.00 PURE HYPERCHOLESTEROLEMIA, UNSPECIFIED: Primary | ICD-10-CM

## 2023-11-14 LAB
ALBUMIN SERPL BCP-MCNC: 4.4 G/DL (ref 3.4–5)
ALP SERPL-CCNC: 60 U/L (ref 33–136)
ALT SERPL W P-5'-P-CCNC: 22 U/L (ref 7–45)
ANION GAP SERPL CALC-SCNC: 11 MMOL/L (ref 10–20)
AST SERPL W P-5'-P-CCNC: 24 U/L (ref 9–39)
BILIRUB SERPL-MCNC: 0.6 MG/DL (ref 0–1.2)
BUN SERPL-MCNC: 29 MG/DL (ref 6–23)
CALCIUM SERPL-MCNC: 9.4 MG/DL (ref 8.6–10.6)
CHLORIDE SERPL-SCNC: 102 MMOL/L (ref 98–107)
CHOLEST SERPL-MCNC: 169 MG/DL (ref 0–199)
CHOLESTEROL/HDL RATIO: 2.6
CO2 SERPL-SCNC: 31 MMOL/L (ref 21–32)
CREAT SERPL-MCNC: 0.68 MG/DL (ref 0.5–1.05)
CRP SERPL-MCNC: <0.1 MG/DL
ERYTHROCYTE [SEDIMENTATION RATE] IN BLOOD BY WESTERGREN METHOD: 4 MM/H (ref 0–30)
GFR SERPL CREATININE-BSD FRML MDRD: >90 ML/MIN/1.73M*2
GLUCOSE SERPL-MCNC: 77 MG/DL (ref 74–99)
HDLC SERPL-MCNC: 65.6 MG/DL
LDLC SERPL CALC-MCNC: 86 MG/DL
NON HDL CHOLESTEROL: 103 MG/DL (ref 0–149)
POTASSIUM SERPL-SCNC: 4.7 MMOL/L (ref 3.5–5.3)
PROT SERPL-MCNC: 6.2 G/DL (ref 6.4–8.2)
SODIUM SERPL-SCNC: 139 MMOL/L (ref 136–145)
TRIGL SERPL-MCNC: 88 MG/DL (ref 0–149)
VLDL: 18 MG/DL (ref 0–40)

## 2023-11-14 PROCEDURE — 80053 COMPREHEN METABOLIC PANEL: CPT

## 2023-11-14 PROCEDURE — 85652 RBC SED RATE AUTOMATED: CPT

## 2023-11-14 PROCEDURE — 86140 C-REACTIVE PROTEIN: CPT

## 2023-11-14 PROCEDURE — 36415 COLL VENOUS BLD VENIPUNCTURE: CPT

## 2023-11-14 PROCEDURE — 80061 LIPID PANEL: CPT

## 2023-11-21 ENCOUNTER — OFFICE VISIT (OUTPATIENT)
Dept: GASTROENTEROLOGY | Facility: EXTERNAL LOCATION | Age: 73
End: 2023-11-21
Payer: MEDICARE

## 2023-11-21 DIAGNOSIS — Z12.11 ENCOUNTER FOR SCREENING FOR MALIGNANT NEOPLASM OF COLON: ICD-10-CM

## 2023-11-21 PROCEDURE — G0121 COLON CA SCRN NOT HI RSK IND: HCPCS | Performed by: INTERNAL MEDICINE

## 2023-11-21 PROCEDURE — 1036F TOBACCO NON-USER: CPT | Performed by: INTERNAL MEDICINE

## 2023-11-21 PROCEDURE — 1159F MED LIST DOCD IN RCRD: CPT | Performed by: INTERNAL MEDICINE

## 2023-11-21 PROCEDURE — 1126F AMNT PAIN NOTED NONE PRSNT: CPT | Performed by: INTERNAL MEDICINE

## 2023-11-27 NOTE — RESULT ENCOUNTER NOTE
CMP ok except for chronically mildly reduced protein - f/u with primary doctor. Lipids are acceptable.

## 2023-12-01 ENCOUNTER — HOSPITAL ENCOUNTER (OUTPATIENT)
Dept: CARDIOLOGY | Facility: CLINIC | Age: 73
Discharge: HOME | End: 2023-12-01
Payer: MEDICARE

## 2023-12-01 DIAGNOSIS — I48.19 ATRIAL FIBRILLATION, PERSISTENT (MULTI): ICD-10-CM

## 2023-12-01 PROCEDURE — 93298 REM INTERROG DEV EVAL SCRMS: CPT | Performed by: INTERNAL MEDICINE

## 2023-12-12 ENCOUNTER — TELEPHONE (OUTPATIENT)
Dept: PRIMARY CARE | Facility: CLINIC | Age: 73
End: 2023-12-12
Payer: MEDICARE

## 2023-12-12 ENCOUNTER — TELEPHONE (OUTPATIENT)
Dept: GASTROENTEROLOGY | Facility: CLINIC | Age: 73
End: 2023-12-12
Payer: MEDICARE

## 2023-12-12 NOTE — TELEPHONE ENCOUNTER
Called Pt and relayed info.  LG    ----- Message from Andrew Ayoub MD sent at 12/12/2023  1:11 PM EST -----  Regarding: FW: Javier Calderón  Contact: [phone]  Check with pt if she has GI doctor can see them have her try famotidine otc 20 mg ty aq  ----- Message -----  From: Jamee Reynolds  Sent: 12/12/2023  11:19 AM EST  To: Andrew Ayoub MD  Subject: FW: Javier Calderón                            ----- Message -----  From: Javier Calderón  Sent: 12/12/2023  11:18 AM EST  To: Do Campuzano [place] [id number] Clinical Support Staff  Subject: Javier Calderón                              I have been having abdominal/stomach discomfort and burning which started when I was on prednisone and which subsided a bit when I stopped at the end of October but not completely and is now increasing in frequency and discomfort. Pesto Bismal  helps some but I can't be taking that all the time every day.  Do I make an appointment with you or with GI?   Thanks.

## 2023-12-19 ENCOUNTER — OFFICE VISIT (OUTPATIENT)
Dept: GASTROENTEROLOGY | Facility: CLINIC | Age: 73
End: 2023-12-19
Payer: MEDICARE

## 2023-12-19 VITALS — HEIGHT: 64 IN | BODY MASS INDEX: 19.46 KG/M2 | WEIGHT: 114 LBS

## 2023-12-19 DIAGNOSIS — K21.9 GASTROESOPHAGEAL REFLUX DISEASE WITHOUT ESOPHAGITIS: Primary | ICD-10-CM

## 2023-12-19 DIAGNOSIS — K63.8219 SMALL INTESTINAL BACTERIAL OVERGROWTH (SIBO): ICD-10-CM

## 2023-12-19 PROBLEM — R19.5 DARK STOOLS: Status: RESOLVED | Noted: 2023-09-09 | Resolved: 2023-12-19

## 2023-12-19 PROBLEM — K60.2 ANAL FISSURE: Status: RESOLVED | Noted: 2023-09-09 | Resolved: 2023-12-19

## 2023-12-19 PROBLEM — R74.8 ELEVATED LIVER ENZYMES: Status: RESOLVED | Noted: 2023-09-09 | Resolved: 2023-12-19

## 2023-12-19 PROBLEM — R13.14 PHARYNGOESOPHAGEAL DYSPHAGIA: Status: RESOLVED | Noted: 2023-09-09 | Resolved: 2023-12-19

## 2023-12-19 PROCEDURE — 1159F MED LIST DOCD IN RCRD: CPT | Performed by: INTERNAL MEDICINE

## 2023-12-19 PROCEDURE — 1126F AMNT PAIN NOTED NONE PRSNT: CPT | Performed by: INTERNAL MEDICINE

## 2023-12-19 PROCEDURE — 1160F RVW MEDS BY RX/DR IN RCRD: CPT | Performed by: INTERNAL MEDICINE

## 2023-12-19 PROCEDURE — 99214 OFFICE O/P EST MOD 30 MIN: CPT | Performed by: INTERNAL MEDICINE

## 2023-12-19 PROCEDURE — 1036F TOBACCO NON-USER: CPT | Performed by: INTERNAL MEDICINE

## 2023-12-19 RX ORDER — DOXYCYCLINE 100 MG/1
100 CAPSULE ORAL 2 TIMES DAILY
Qty: 20 CAPSULE | Refills: 0 | Status: SHIPPED | OUTPATIENT
Start: 2023-12-19 | End: 2023-12-29

## 2023-12-19 NOTE — PROGRESS NOTES
"Subjective     History of Present Illness:   Javier Calderón is a 73 y.o. female who presents to GI clinic for burning in epigastrium and sometimes periumbilical pain, bloating.Pepcid helped burning, but not the rest. Caused headaches she feels  Bloated, nauseated, feels like \"my SIBO is back.\"  Feels the higher sugar during her clear liquid colon prep made her much worse. Tries to manatge herself with low risk.  Very weight conscious (I weighed #150 in 8th grade).  Was helped by doxycycline 100 bid when did second 10 day course after first helped some and then few months later did 3rd course.    Swims daily.    Diet:  Bk: sweet potato, egg white    Later egg whites/spinach    Lunch and dinnner:  Broccoli, brussel sprouts, beets, sweet potato; salmon or chicken, olive oil, vinegar    Jelena: water  Temporal arteritis in remission after long course of Prednisone and taper.  Review of Systems  Review of Systems   All other systems reviewed and are negative.      Social History   reports that she has never smoked. She has never used smokeless tobacco. She reports that she does not drink alcohol and does not use drugs.     Allergies  Allergies   Allergen Reactions    Bee Venom Protein (Honey Bee) Unknown       Medications  Current Outpatient Medications   Medication Instructions    aspirin 81 mg EC tablet 1 tablet, oral, Daily    b complex 0.4 mg tablet 1 tablet, oral, Daily    calcium carbonate 600 mg calcium (1,500 mg) tablet 1 tablet, oral, Daily    cholecalciferol (Vitamin D-3) 25 MCG (1000 UT) tablet Take as directed.    estradiol (Estrace) 0.01 % (0.1 mg/gram) vaginal cream vaginal, Apply a pea sized amount of vaginal area daily x2 weeks, then three times a week as needed<BR>    fluticasone (Cutivate) 0.05 % cream Topical, As needed, Apply inch.    fluticasone (Flonase) 50 mcg/actuation nasal spray 2 sprays, Each Nostril, Daily    MULTIVITAMIN ORAL 1 capsule, oral, Daily    mupirocin (Bactroban) 2 % ointment " Topical, 3 times daily RT, Apply ointment topically to affected area.    nitroglycerin (NITROSTAT) 0.4 mg, sublingual, Every 5 min PRN, For up to 3 doses as needed. Call 911 if pain persists.    polyethylene glycol-electrolytes (Golytely) 420 gram solution Begin drinking first half of prep 6pm the night before procedure. Start second half 5 hours before procedure time.        Objective   There were no vitals taken for this visit.   Physical Exam  Constitutional:       Appearance: Normal appearance.   HENT:      Mouth/Throat:      Mouth: Mucous membranes are moist.      Pharynx: Oropharynx is clear.   Eyes:      Extraocular Movements: Extraocular movements intact.      Pupils: Pupils are equal, round, and reactive to light.   Cardiovascular:      Rate and Rhythm: Normal rate and regular rhythm.      Heart sounds: No murmur heard.  Pulmonary:      Effort: Pulmonary effort is normal.      Breath sounds: Normal breath sounds.   Abdominal:      General: Abdomen is flat. Bowel sounds are normal.      Palpations: Abdomen is soft. There is no mass.   Skin:     General: Skin is warm and dry.   Neurological:      Mental Status: She is alert.   Psychiatric:         Mood and Affect: Mood normal.         Behavior: Behavior normal.         Thought Content: Thought content normal.         Judgment: Judgment normal.           Assessment/Plan   Javier Calderón is a 73 y.o. female who presents to GI clinic for bloating, abdominal discomfort. Likely recurrence of her SIBO. Less likely peptic ulcer disease, but possible post-steroids and with the epigastric burning.  Feels Pepcid gave her headaches and that PPIs let to her initial SIBO (was seeing Dr. Joe Watters.)    Plan:    Doxycycline 100 bid for 10 days (possibly repeat course if partial response)  If symptoms persist, consider EGD.      [place] MD

## 2024-01-09 ENCOUNTER — HOSPITAL ENCOUNTER (OUTPATIENT)
Dept: CARDIOLOGY | Facility: CLINIC | Age: 74
Discharge: HOME | End: 2024-01-09
Payer: MEDICARE

## 2024-01-09 ENCOUNTER — OFFICE VISIT (OUTPATIENT)
Dept: PRIMARY CARE | Facility: CLINIC | Age: 74
End: 2024-01-09
Payer: MEDICARE

## 2024-01-09 VITALS — WEIGHT: 114 LBS | DIASTOLIC BLOOD PRESSURE: 69 MMHG | SYSTOLIC BLOOD PRESSURE: 100 MMHG | BODY MASS INDEX: 19.57 KG/M2

## 2024-01-09 DIAGNOSIS — M81.0 OSTEOPOROSIS, UNSPECIFIED OSTEOPOROSIS TYPE, UNSPECIFIED PATHOLOGICAL FRACTURE PRESENCE: ICD-10-CM

## 2024-01-09 DIAGNOSIS — E78.00 ELEVATED LDL CHOLESTEROL LEVEL: Primary | ICD-10-CM

## 2024-01-09 DIAGNOSIS — K21.9 GASTROESOPHAGEAL REFLUX DISEASE, UNSPECIFIED WHETHER ESOPHAGITIS PRESENT: ICD-10-CM

## 2024-01-09 DIAGNOSIS — I48.19 ATRIAL FIBRILLATION, PERSISTENT (MULTI): ICD-10-CM

## 2024-01-09 DIAGNOSIS — I48.91 ATRIAL FIBRILLATION, UNSPECIFIED TYPE (MULTI): ICD-10-CM

## 2024-01-09 DIAGNOSIS — K63.8219 SMALL INTESTINAL BACTERIAL OVERGROWTH (SIBO): Primary | ICD-10-CM

## 2024-01-09 PROCEDURE — 1126F AMNT PAIN NOTED NONE PRSNT: CPT | Performed by: INTERNAL MEDICINE

## 2024-01-09 PROCEDURE — 1036F TOBACCO NON-USER: CPT | Performed by: INTERNAL MEDICINE

## 2024-01-09 PROCEDURE — 99214 OFFICE O/P EST MOD 30 MIN: CPT | Performed by: INTERNAL MEDICINE

## 2024-01-09 PROCEDURE — 1159F MED LIST DOCD IN RCRD: CPT | Performed by: INTERNAL MEDICINE

## 2024-01-09 RX ORDER — DOXYCYCLINE 100 MG/1
100 CAPSULE ORAL 2 TIMES DAILY
Qty: 20 CAPSULE | Refills: 0 | Status: SHIPPED | OUTPATIENT
Start: 2024-01-09 | End: 2024-01-19

## 2024-01-09 ASSESSMENT — ENCOUNTER SYMPTOMS
DEPRESSION: 0
OCCASIONAL FEELINGS OF UNSTEADINESS: 0
LOSS OF SENSATION IN FEET: 0

## 2024-01-09 ASSESSMENT — PATIENT HEALTH QUESTIONNAIRE - PHQ9
SUM OF ALL RESPONSES TO PHQ9 QUESTIONS 1 AND 2: 0
2. FEELING DOWN, DEPRESSED OR HOPELESS: NOT AT ALL
1. LITTLE INTEREST OR PLEASURE IN DOING THINGS: NOT AT ALL

## 2024-01-09 NOTE — PROGRESS NOTES
Subjective   Patient ID: Javier Calderón is a 73 y.o. female who presents for FUV.    HPI  Patient in for a visit  Had some stomach problems last month took pepto went to see DR Naranjo placed back on doxy x 10 days last time she needed two courses so she is now starting the second course , she is good with her diet  She also had urinary incontinence and it got better  with the doxy  Horrible vertigo out of nowehere 1 1/2 months ago   Did the maneuver epley and she is good now    Review of Systems  General: Denies fever, chills, night sweats,  Eyes: Negative for recent visual changes  Ears, Nose, Throat :  Negative for hearing changes, sinus discomfort  Dermatologic: Negative for new skin conditions, rash  Respiratory: Negative for wheezing, shortness of breath, cough  Cardiovascular: Negative for chest pain, palpitations, or leg swelling  Gastrointestinal: Negative for vomiting,  changes in bowel habits  Genitourinary NEGATIVE FOR URINARY INCONTINENCE urgency , frequency, discomfort   Musculoskeletal: see hpi  Neurological: Negative for headaches, dizziness    Previous history  Past Medical History:   Diagnosis Date    Abnormal levels of other serum enzymes 03/08/2022    Elevated liver enzymes    Abnormal weight loss 09/27/2018    Weight loss    Dark stools 09/09/2023    Diarrhea, unspecified 12/31/2018    Diarrhea    Disorder of kidney and ureter, unspecified 11/08/2022    Abnormal renal function    Disorder of teeth and supporting structures, unspecified 02/22/2019    Dental disorder    Elevated liver enzymes 09/09/2023    Encounter for general adult medical examination without abnormal findings 03/08/2022    Encounter for Medicare annual wellness exam    Encounter for immunization 03/08/2022    Need for pneumococcal vaccination    Encounter for other screening for malignant neoplasm of breast 02/21/2020    Screening for malignant neoplasm of breast    Encounter for other screening for malignant neoplasm of  breast 06/09/2022    Breast screening    Encounter for screening mammogram for malignant neoplasm of breast 02/14/2018    Visit for screening mammogram    Gastro-esophageal reflux disease without esophagitis 06/23/2020    Esophageal reflux    Hypo-osmolality and hyponatremia 09/25/2019    Hyponatremia    Injury of peritoneum, initial encounter 08/23/2019    Peritoneal hematoma    Long term (current) use of anticoagulants 09/28/2018    Chronic anticoagulation    Otalgia, right ear 12/31/2018    Otalgia, right    Otalgia, unspecified ear 12/31/2018    Otalgia    Other abnormality of red blood cells 11/08/2022    Elevated MCV    Other conditions influencing health status 11/22/2021    Oral disorder    Other fatigue 03/19/2020    Fatigue    Other fecal abnormalities 06/29/2018    Dark stools    Other specified abnormal findings of blood chemistry 11/16/2022    Low vitamin D level    Other specified abnormal findings of blood chemistry 02/21/2022    Abnormal liver function test    Other specified diseases of intestine     Small intestinal bacterial overgrowth (SIBO)    Other specified symptoms and signs involving the circulatory and respiratory systems 11/17/2017    Abnormal finding of lung    Personal history of diseases of the blood and blood-forming organs and certain disorders involving the immune mechanism 03/18/2020    History of thrombocytosis    Personal history of diseases of the blood and blood-forming organs and certain disorders involving the immune mechanism 03/18/2020    History of thrombocytosis    Personal history of other specified conditions 12/20/2022    History of nausea    Personal history of other specified conditions 12/20/2022    History of abdominal pain    Pharyngoesophageal dysphagia 09/09/2023    Pure hypercholesterolemia, unspecified 11/08/2022    Elevated LDL cholesterol level    Pure hyperglyceridemia 11/16/2022    High triglycerides    Rash and other nonspecific skin eruption 12/30/2020     Rash    Toxic effect of venom of wasps, accidental (unintentional), initial encounter 09/18/2017    Wasp sting    Unspecified abnormal findings in urine 03/04/2022    Urine abnormality    Unspecified menopausal and perimenopausal disorder 01/23/2018    Menopausal and postmenopausal disorder     Past Surgical History:   Procedure Laterality Date    CATARACT EXTRACTION  01/08/2018    Cataract Surgery    CT ABDOMEN PELVIS ANGIOGRAM W AND/OR WO IV CONTRAST  6/12/2018    CT ABDOMEN PELVIS ANGIOGRAM W AND/OR WO IV CONTRAST 6/12/2018 [facility]    CT ABDOMEN PELVIS ANGIOGRAM W AND/OR WO IV CONTRAST  6/14/2018    CT ABDOMEN PELVIS ANGIOGRAM W AND/OR WO IV CONTRAST 6/14/2018 [facility]    IR ANGIOGRAM INFERIOR EPIGASTRIC PELVIC  6/14/2018    IR ANGIOGRAM INFERIOR EPIGASTRIC PELVIC 6/14/2018 [facility]    IR EMBOLIZATION LYMPH NODE Bilateral 6/14/2018    IR EMBOLIZATION LYMPH NODE 6/14/2018 [facility]    IR EMBOLIZATION LYMPH NODE Bilateral 6/14/2018    IR EMBOLIZATION LYMPH NODE 6/14/2018 [facility]    OTHER SURGICAL HISTORY  03/07/2018    Programming And Iterative Adjustment Of Implantable Loop Recorder    OTHER SURGICAL HISTORY  11/08/2022    Esophagogastroduodenoscopy     Social History     Tobacco Use    Smoking status: Never    Smokeless tobacco: Never   Vaping Use    Vaping Use: Never used   Substance Use Topics    Alcohol use: Never    Drug use: Never     Family History   Problem Relation Name Age of Onset    Coronary artery disease Mother      Atrial fibrillation Mother      Heart failure Mother      Hypertension Mother      Coronary artery disease Father      Other (CVA) Father      Heart failure Father      Hypertension Father      Other (Mitral valve replacement) Father      Polymyalgia rheumatica Brother      No Known Problems Son      Coronary artery disease Other Multiple Family Members     Other (Diabetes mellitus) Other Family History     Lung cancer Other Family History     Brain cancer  Other Family History     Pancreatic cancer Other Family History     Throat cancer Other Family History      Allergies   Allergen Reactions    Bee Venom Protein (Honey Bee) Unknown     Current Outpatient Medications   Medication Instructions    aspirin 81 mg EC tablet 1 tablet, oral, Daily    b complex 0.4 mg tablet 1 tablet, oral, Daily    calcium carbonate 600 mg calcium (1,500 mg) tablet 1 tablet, oral, Daily    cholecalciferol (Vitamin D-3) 25 MCG (1000 UT) tablet Take as directed.    estradiol (Estrace) 0.01 % (0.1 mg/gram) vaginal cream vaginal, Apply a pea sized amount of vaginal area daily x2 weeks, then three times a week as needed<BR>    fluticasone (Cutivate) 0.05 % cream Topical, As needed, Apply inch.    fluticasone (Flonase) 50 mcg/actuation nasal spray 2 sprays, Each Nostril, Daily    MULTIVITAMIN ORAL 1 capsule, oral, Daily    mupirocin (Bactroban) 2 % ointment Topical, 3 times daily RT, Apply ointment topically to affected area.    nitroglycerin (NITROSTAT) 0.4 mg, sublingual, Every 5 min PRN, For up to 3 doses as needed. Call 911 if pain persists.       Objective       Physical Exam    Vital Signs: as recorded above  General: Well groomed, well nourished   Orientation:  Alert , oriented to time, place , and person   Mood and Affect:  Cooperative , no apparent distress normal affect  Skin: Good color, good turgor  Eyes: Extra ocular muscle movements intact, anicteric sclerae  Neck: Supple, full range of movement  Chest: Normal breath sounds, normal chest wall exam, symmetric, good air entry, clear to auscultation  Heart: Regular rate and rhythm, without murmur, gallop, or rubs  Abdomen soft nontender no masses felt no hepatosplenomegaly, no rebound or guarding  BACK:  no CTLS spine tenderness, no flank tenderness  Extremities: full range of movement  bilateral UE and bilateral LE,  no lower extremity edema  Neurological: Alert, oriented, cranial nerves II-XII intact except for visual  acuity  Sensation:  Intact   Gait: normal steady    Assessment/Plan   Javier Calderón is a 73 y.o. female who presents for the concerns below:    Problem List Items Addressed This Visit    None    DYSPEPSIA saw Dr Naranjo was on Doxy x 10 days  she is already following FODMAP Tylenol only for pain     Low grade UTI apparently which urinary incontinence was the only manifestation if it occurs again will check urine culture      OSTEOPOROSIS     Patient is seeing Dr Rodgers after  Reports walking/ weight bearing exercise but not regimented.  Takes calcium and vitamin D supplements.    Recommended shingrix at the pharmacy    Vertigo resolved with epley maneuver    Discussed with:   Return in :  mid may for awv    Portions of this note were generated using digital voice recognition software, and may contain grammatical errors       Andrew Ayoub MD  01/09/24  11:15 AM

## 2024-01-14 ENCOUNTER — APPOINTMENT (OUTPATIENT)
Dept: RADIOLOGY | Facility: HOSPITAL | Age: 74
End: 2024-01-14
Payer: MEDICARE

## 2024-01-14 ENCOUNTER — HOSPITAL ENCOUNTER (EMERGENCY)
Facility: HOSPITAL | Age: 74
Discharge: HOME | End: 2024-01-14
Attending: EMERGENCY MEDICINE
Payer: MEDICARE

## 2024-01-14 VITALS
RESPIRATION RATE: 16 BRPM | TEMPERATURE: 97.3 F | OXYGEN SATURATION: 97 % | WEIGHT: 110 LBS | SYSTOLIC BLOOD PRESSURE: 106 MMHG | DIASTOLIC BLOOD PRESSURE: 72 MMHG | HEART RATE: 69 BPM | HEIGHT: 64 IN | BODY MASS INDEX: 18.78 KG/M2

## 2024-01-14 DIAGNOSIS — S09.90XA HEAD INJURY, INITIAL ENCOUNTER: Primary | ICD-10-CM

## 2024-01-14 LAB — GLUCOSE BLD MANUAL STRIP-MCNC: 97 MG/DL (ref 74–99)

## 2024-01-14 PROCEDURE — 82947 ASSAY GLUCOSE BLOOD QUANT: CPT

## 2024-01-14 PROCEDURE — 70450 CT HEAD/BRAIN W/O DYE: CPT | Performed by: RADIOLOGY

## 2024-01-14 PROCEDURE — 99284 EMERGENCY DEPT VISIT MOD MDM: CPT | Performed by: EMERGENCY MEDICINE

## 2024-01-14 PROCEDURE — 70450 CT HEAD/BRAIN W/O DYE: CPT

## 2024-01-14 ASSESSMENT — PAIN - FUNCTIONAL ASSESSMENT: PAIN_FUNCTIONAL_ASSESSMENT: 0-10

## 2024-01-14 ASSESSMENT — PAIN DESCRIPTION - LOCATION: LOCATION: HEAD

## 2024-01-14 ASSESSMENT — PAIN SCALES - GENERAL
PAINLEVEL_OUTOF10: 0 - NO PAIN
PAINLEVEL_OUTOF10: 2
PAINLEVEL_OUTOF10: 9

## 2024-01-14 ASSESSMENT — PAIN DESCRIPTION - PAIN TYPE: TYPE: ACUTE PAIN

## 2024-01-14 NOTE — ED TRIAGE NOTES
Pt was sent from urgent care for eval of head injury/neck pain and nausea. Pt was swimming and collided with another person injuring her head. Denies blood thinners. Denies LOC.   
none

## 2024-02-13 ENCOUNTER — APPOINTMENT (OUTPATIENT)
Dept: RHEUMATOLOGY | Facility: CLINIC | Age: 74
End: 2024-02-13
Payer: MEDICARE

## 2024-02-14 ENCOUNTER — HOSPITAL ENCOUNTER (OUTPATIENT)
Dept: CARDIOLOGY | Facility: CLINIC | Age: 74
Discharge: HOME | End: 2024-02-14
Payer: MEDICARE

## 2024-02-14 DIAGNOSIS — I48.19 ATRIAL FIBRILLATION, PERSISTENT (MULTI): ICD-10-CM

## 2024-02-26 ENCOUNTER — HOSPITAL ENCOUNTER (OUTPATIENT)
Dept: CARDIOLOGY | Facility: CLINIC | Age: 74
Discharge: HOME | End: 2024-02-26
Payer: MEDICARE

## 2024-02-26 DIAGNOSIS — I48.19 ATRIAL FIBRILLATION, PERSISTENT (MULTI): ICD-10-CM

## 2024-03-05 ENCOUNTER — LAB (OUTPATIENT)
Dept: LAB | Facility: LAB | Age: 74
End: 2024-03-05
Payer: MEDICARE

## 2024-03-05 ENCOUNTER — APPOINTMENT (OUTPATIENT)
Dept: LAB | Facility: LAB | Age: 74
End: 2024-03-05
Payer: MEDICARE

## 2024-03-05 ENCOUNTER — OFFICE VISIT (OUTPATIENT)
Dept: RHEUMATOLOGY | Facility: CLINIC | Age: 74
End: 2024-03-05
Payer: MEDICARE

## 2024-03-05 VITALS — DIASTOLIC BLOOD PRESSURE: 71 MMHG | WEIGHT: 111 LBS | BODY MASS INDEX: 19.05 KG/M2 | SYSTOLIC BLOOD PRESSURE: 111 MMHG

## 2024-03-05 DIAGNOSIS — M81.0 AGE-RELATED OSTEOPOROSIS WITHOUT CURRENT PATHOLOGICAL FRACTURE: Primary | ICD-10-CM

## 2024-03-05 DIAGNOSIS — M81.0 AGE-RELATED OSTEOPOROSIS WITHOUT CURRENT PATHOLOGICAL FRACTURE: ICD-10-CM

## 2024-03-05 LAB — CALCIUM SERPL-MCNC: 9.4 MG/DL (ref 8.6–10.6)

## 2024-03-05 PROCEDURE — 1126F AMNT PAIN NOTED NONE PRSNT: CPT | Performed by: INTERNAL MEDICINE

## 2024-03-05 PROCEDURE — 36415 COLL VENOUS BLD VENIPUNCTURE: CPT

## 2024-03-05 PROCEDURE — 99213 OFFICE O/P EST LOW 20 MIN: CPT | Performed by: INTERNAL MEDICINE

## 2024-03-05 PROCEDURE — 1159F MED LIST DOCD IN RCRD: CPT | Performed by: INTERNAL MEDICINE

## 2024-03-05 PROCEDURE — 82310 ASSAY OF CALCIUM: CPT

## 2024-03-05 PROCEDURE — 1036F TOBACCO NON-USER: CPT | Performed by: INTERNAL MEDICINE

## 2024-03-05 NOTE — PATIENT INSTRUCTIONS
Continue calcium and vitamin D.  We will continue Prolia injection every 6 months.  Continue weightbearing exercises.  Follow fall precautions.  Call me if any question.  Follow-up in 12 months or sooner if needed.

## 2024-03-05 NOTE — PROGRESS NOTES
Subjective . Javier Calderón is a 74 y.o. female who presents for Follow-up.    HPI. 73-year-old female with history of osteoporosis, OA, C-spine DJD with foraminal stenosis and A-fib s/p ablation presented for follow-up.     No recent fall or fractures.  Take calcium and vitamin D regularly.    She was on prednisone for 6 months last year for probable GCA however she has had negative temporal artery biopsy.  Prednisone was discontinued in October 2023.    Bone densitometry obtained on February 13, 2024 showed L1-L4 bone density of 1.093 g/cm² reflecting at least 1, right femoral neck bone density of 0.670 g/cm² reflecting a T-score of -2.7 and right total hip bone density of 0.722 g/cm² reflecting a T-score of -2.3.    Positive antiosteoporotic treatment: Reclast completed 3 infusions.    Review of Systems   All other systems reviewed and are negative.    Objective  .   Blood pressure 111/71, weight 50.3 kg (111 lb).    Physical Exam.  Gen. AAO x3, NAD.  HEENT: No pallor or icterus, PERRLA, EOMI.No cervical lymphadenopathy .  Skin: No rashes.  Heart: S1, S2/ RRR.   Lungs: CTA B.  Abdomen: Soft, NT/ND  MSK: No.swelling or tenderness of the  upper or lower extremity joints with full ROM, Neck,spine and Navi SI with out tenderness.  Neuro: Sensation to touch intact.Strength 5/5 throughout.   Psych:Appropriate mood and behavior  EXT: No edema    Assessment/Plan . 73-year-old female with history of osteoporosis, OA, C-spine DJD with foraminal stenosis and A-fib s/p ablation presented for follow-up.     #1: Postmenopausal osteoporosis.  Bone density results reviewed with the patient.  She was on prednisone for 6 months last year.  -Continue Prolia injection every 6 months.  -Continue calcium and vitamin D.    Follow-up in 12 months.     This note was partially generated using the Dragon Voice recognition system. There may be some incorrect wording, spelling and/or spelling errors or punctuation errors that were not  corrected prior to committing the note to the medical record.      Problem List Items Addressed This Visit    None           Active Ambulatory Problems     Diagnosis Date Noted    Abnormal finding of lung 09/09/2023    Abnormal renal function 09/09/2023    Allergic bronchitis 09/09/2023    Allergic reaction to bee sting 09/09/2023    Allergic urticaria 09/09/2023    Aortic thrombus (CMS/[facility]) 09/09/2023    Arthritis 09/09/2023    A-fib (CMS/[facility]) 09/09/2023    Atrial flutter (CMS/[facility]) 09/09/2023    Paroxysmal atrial fibrillation (CMS/[facility]) 09/09/2023    Dental disorder 09/09/2023    Elevated LDL cholesterol level 09/09/2023    Elevated MCV 09/09/2023    Esophageal reflux 09/09/2023    Essential (hemorrhagic) thrombocythemia (CMS/[facility]) 09/09/2023    Fatigue 09/09/2023    Fracture of left patella 09/09/2023    Heart palpitations 09/09/2023    High triglycerides 09/09/2023    Hyponatremia 09/09/2023    Laceration of left middle finger without foreign body without damage to nail 09/09/2023    Abnormal liver function test 09/09/2023    Low vitamin D level 09/09/2023    Mechanical low back pain 09/09/2023    Menopausal and postmenopausal disorder 09/09/2023    Osteoarthritis, multiple sites 09/09/2023    Osteoporosis without current pathological fracture 09/09/2023    Otalgia, right 09/09/2023    Otalgia 09/09/2023    Peritoneal hematoma 09/09/2023    Presence of cardiac and vascular implant and graft 09/09/2023    Primary localized osteoarthritis of left hip 09/09/2023    Pulmonary nodule 09/09/2023    Scoliosis 09/09/2023    Segmental and somatic dysfunction of pelvic region 09/09/2023    Urine abnormality 09/09/2023    Vaginal dryness, menopausal 09/09/2023    Ventricular tachycardia (CMS/HCC) 09/09/2023    Wasp sting 09/09/2023    Weight loss 09/09/2023    Condition not found 09/09/2023    Small intestinal bacterial overgrowth (SIBO) 12/19/2023     Resolved Ambulatory Problems     Diagnosis Date Noted    Anal fissure  09/09/2023    Dark stools 09/09/2023    Elevated liver enzymes 09/09/2023    Pharyngoesophageal dysphagia 09/09/2023     Past Medical History:   Diagnosis Date    Abnormal levels of other serum enzymes 03/08/2022    Abnormal weight loss 09/27/2018    Diarrhea, unspecified 12/31/2018    Disorder of kidney and ureter, unspecified 11/08/2022    Disorder of teeth and supporting structures, unspecified 02/22/2019    Encounter for general adult medical examination without abnormal findings 03/08/2022    Encounter for immunization 03/08/2022    Encounter for other screening for malignant neoplasm of breast 02/21/2020    Encounter for other screening for malignant neoplasm of breast 06/09/2022    Encounter for screening mammogram for malignant neoplasm of breast 02/14/2018    Gastro-esophageal reflux disease without esophagitis 06/23/2020    Hypo-osmolality and hyponatremia 09/25/2019    Injury of peritoneum, initial encounter 08/23/2019    Long term (current) use of anticoagulants 09/28/2018    Otalgia, right ear 12/31/2018    Otalgia, unspecified ear 12/31/2018    Other abnormality of red blood cells 11/08/2022    Other conditions influencing health status 11/22/2021    Other fatigue 03/19/2020    Other fecal abnormalities 06/29/2018    Other specified abnormal findings of blood chemistry 11/16/2022    Other specified abnormal findings of blood chemistry 02/21/2022    Other specified diseases of intestine     Other specified symptoms and signs involving the circulatory and respiratory systems 11/17/2017    Personal history of diseases of the blood and blood-forming organs and certain disorders involving the immune mechanism 03/18/2020    Personal history of diseases of the blood and blood-forming organs and certain disorders involving the immune mechanism 03/18/2020    Personal history of other specified conditions 12/20/2022    Personal history of other specified conditions 12/20/2022    Pure hypercholesterolemia,  unspecified 11/08/2022    Pure hyperglyceridemia 11/16/2022    Rash and other nonspecific skin eruption 12/30/2020    Toxic effect of venom of wasps, accidental (unintentional), initial encounter 09/18/2017    Unspecified abnormal findings in urine 03/04/2022    Unspecified menopausal and perimenopausal disorder 01/23/2018       Family History   Problem Relation Name Age of Onset    Coronary artery disease Mother      Atrial fibrillation Mother      Heart failure Mother      Hypertension Mother      Coronary artery disease Father      Other (CVA) Father      Heart failure Father      Hypertension Father      Other (Mitral valve replacement) Father      Polymyalgia rheumatica Brother      No Known Problems Son      Coronary artery disease Other Multiple Family Members     Other (Diabetes mellitus) Other Family History     Lung cancer Other Family History     Brain cancer Other Family History     Pancreatic cancer Other Family History     Throat cancer Other Family History        Past Surgical History:   Procedure Laterality Date    CATARACT EXTRACTION  01/08/2018    Cataract Surgery    CT ABDOMEN PELVIS ANGIOGRAM W AND/OR WO IV CONTRAST  6/12/2018    CT ABDOMEN PELVIS ANGIOGRAM W AND/OR WO IV CONTRAST 6/12/2018 [facility]    CT ABDOMEN PELVIS ANGIOGRAM W AND/OR WO IV CONTRAST  6/14/2018    CT ABDOMEN PELVIS ANGIOGRAM W AND/OR WO IV CONTRAST 6/14/2018 [facility]    IR ANGIOGRAM INFERIOR EPIGASTRIC PELVIC  6/14/2018    IR ANGIOGRAM INFERIOR EPIGASTRIC PELVIC 6/14/2018 [facility]    IR EMBOLIZATION LYMPH NODE Bilateral 6/14/2018    IR EMBOLIZATION LYMPH NODE 6/14/2018 [facility]    IR EMBOLIZATION LYMPH NODE Bilateral 6/14/2018    IR EMBOLIZATION LYMPH NODE 6/14/2018 [facility]    OTHER SURGICAL HISTORY  03/07/2018    Programming And Iterative Adjustment Of Implantable Loop Recorder    OTHER SURGICAL HISTORY  11/08/2022    Esophagogastroduodenoscopy       Social History     Tobacco Use   Smoking  Status Never   Smokeless Tobacco Never       Allergies  Bee venom protein (honey bee)    Current Meds  Current Outpatient Medications   Medication Instructions    aspirin 81 mg EC tablet 1 tablet, oral, Daily    b complex 0.4 mg tablet 1 tablet, oral, Daily    calcium carbonate 600 mg calcium (1,500 mg) tablet 1 tablet, oral, Daily    cholecalciferol (Vitamin D-3) 25 MCG (1000 UT) tablet Take as directed.    estradiol (Estrace) 0.01 % (0.1 mg/gram) vaginal cream vaginal, Apply a pea sized amount of vaginal area daily x2 weeks, then three times a week as needed<BR>    fluticasone (Cutivate) 0.05 % cream Topical, As needed, Apply inch.    fluticasone (Flonase) 50 mcg/actuation nasal spray 2 sprays, Each Nostril, Daily    MULTIVITAMIN ORAL 1 capsule, oral, Daily    mupirocin (Bactroban) 2 % ointment Topical, 3 times daily RT, Apply ointment topically to affected area.    nitroglycerin (NITROSTAT) 0.4 mg, sublingual, Every 5 min PRN, For up to 3 doses as needed. Call 911 if pain persists.        Lab Results   Component Value Date    SEDRATE 4 11/14/2023    CRP <0.10 11/14/2023    DNADS <1.0 12/05/2017             Andrew Rodgers MD

## 2024-03-06 ENCOUNTER — HOSPITAL ENCOUNTER (OUTPATIENT)
Dept: CARDIOLOGY | Facility: CLINIC | Age: 74
Discharge: HOME | End: 2024-03-06
Payer: MEDICARE

## 2024-03-06 DIAGNOSIS — I48.19 ATRIAL FIBRILLATION, PERSISTENT (MULTI): ICD-10-CM

## 2024-03-06 PROCEDURE — 93298 REM INTERROG DEV EVAL SCRMS: CPT

## 2024-03-06 PROCEDURE — 93298 REM INTERROG DEV EVAL SCRMS: CPT | Performed by: INTERNAL MEDICINE

## 2024-03-13 ENCOUNTER — HOSPITAL ENCOUNTER (OUTPATIENT)
Dept: CARDIOLOGY | Facility: CLINIC | Age: 74
Discharge: HOME | End: 2024-03-13
Payer: MEDICARE

## 2024-03-13 DIAGNOSIS — I48.19 ATRIAL FIBRILLATION, PERSISTENT (MULTI): ICD-10-CM

## 2024-03-14 DIAGNOSIS — M81.0 OSTEOPOROSIS WITHOUT CURRENT PATHOLOGICAL FRACTURE, UNSPECIFIED OSTEOPOROSIS TYPE: Primary | ICD-10-CM

## 2024-03-14 RX ORDER — FAMOTIDINE 10 MG/ML
20 INJECTION INTRAVENOUS ONCE AS NEEDED
Status: CANCELLED | OUTPATIENT
Start: 2024-03-22

## 2024-03-14 RX ORDER — DIPHENHYDRAMINE HYDROCHLORIDE 50 MG/ML
50 INJECTION INTRAMUSCULAR; INTRAVENOUS AS NEEDED
Status: CANCELLED | OUTPATIENT
Start: 2024-03-22

## 2024-03-14 RX ORDER — ALBUTEROL SULFATE 0.83 MG/ML
3 SOLUTION RESPIRATORY (INHALATION) AS NEEDED
Status: CANCELLED | OUTPATIENT
Start: 2024-03-22

## 2024-03-14 RX ORDER — EPINEPHRINE 0.3 MG/.3ML
0.3 INJECTION SUBCUTANEOUS EVERY 5 MIN PRN
Status: CANCELLED | OUTPATIENT
Start: 2024-03-22

## 2024-03-14 NOTE — PROGRESS NOTES
Continuation of prolia therapy per 3/5/24 fuv with Dr. Rodgers. Next inj juli for 3/22/24 at [facility].

## 2024-03-18 ENCOUNTER — HOSPITAL ENCOUNTER (OUTPATIENT)
Dept: CARDIOLOGY | Facility: CLINIC | Age: 74
Discharge: HOME | End: 2024-03-18
Payer: MEDICARE

## 2024-03-18 DIAGNOSIS — I48.19 ATRIAL FIBRILLATION, PERSISTENT (MULTI): ICD-10-CM

## 2024-03-22 ENCOUNTER — INFUSION (OUTPATIENT)
Dept: INFUSION THERAPY | Facility: CLINIC | Age: 74
End: 2024-03-22
Payer: MEDICARE

## 2024-03-22 VITALS
BODY MASS INDEX: 19.3 KG/M2 | TEMPERATURE: 98 F | SYSTOLIC BLOOD PRESSURE: 97 MMHG | RESPIRATION RATE: 18 BRPM | WEIGHT: 112.43 LBS | DIASTOLIC BLOOD PRESSURE: 67 MMHG | OXYGEN SATURATION: 96 % | HEART RATE: 77 BPM

## 2024-03-22 DIAGNOSIS — M81.0 OSTEOPOROSIS WITHOUT CURRENT PATHOLOGICAL FRACTURE, UNSPECIFIED OSTEOPOROSIS TYPE: ICD-10-CM

## 2024-03-22 PROCEDURE — 96372 THER/PROPH/DIAG INJ SC/IM: CPT | Performed by: NURSE PRACTITIONER

## 2024-03-22 RX ORDER — FAMOTIDINE 10 MG/ML
20 INJECTION INTRAVENOUS ONCE AS NEEDED
OUTPATIENT
Start: 2024-09-18

## 2024-03-22 RX ORDER — ALBUTEROL SULFATE 0.83 MG/ML
3 SOLUTION RESPIRATORY (INHALATION) AS NEEDED
OUTPATIENT
Start: 2024-09-18

## 2024-03-22 RX ORDER — EPINEPHRINE 0.3 MG/.3ML
0.3 INJECTION SUBCUTANEOUS EVERY 5 MIN PRN
OUTPATIENT
Start: 2024-09-18

## 2024-03-22 RX ORDER — DIPHENHYDRAMINE HYDROCHLORIDE 50 MG/ML
50 INJECTION INTRAMUSCULAR; INTRAVENOUS AS NEEDED
OUTPATIENT
Start: 2024-09-18

## 2024-03-22 ASSESSMENT — ENCOUNTER SYMPTOMS
NERVOUS/ANXIOUS: 0
EYE PROBLEMS: 0
TROUBLE SWALLOWING: 0
FREQUENCY: 0
FEVER: 0
COUGH: 0
NAUSEA: 0
SHORTNESS OF BREATH: 0
UNEXPECTED WEIGHT CHANGE: 0
DIARRHEA: 0
ABDOMINAL PAIN: 0
DIZZINESS: 0
BRUISES/BLEEDS EASILY: 0
LEG SWELLING: 0
MYALGIAS: 0
APPETITE CHANGE: 0
SLEEP DISTURBANCE: 0
ARTHRALGIAS: 0
WHEEZING: 0
DEPRESSION: 0
HEADACHES: 0
CHILLS: 0
PALPITATIONS: 0
LIGHT-HEADEDNESS: 0
VOMITING: 0
WOUND: 0
HEMATURIA: 0
CONSTIPATION: 0
NUMBNESS: 0
CONFUSION: 0
SORE THROAT: 0
EXTREMITY WEAKNESS: 0
DYSURIA: 0
FATIGUE: 0

## 2024-03-22 NOTE — PATIENT INSTRUCTIONS
Today :We administered denosumab. Prolia 60mg subcutaneous injection left upper arm  Blood Calcium within 28 days of next Prolia appointment    For:   1. Osteoporosis without current pathological fracture, unspecified osteoporosis type       Your next appointment is due in:  6 months      Please read the  Medication Guide that was given to you and reviewed during todays visit.     (Tell all doctors including dentists that you are taking this medication)     Go to the emergency room or call 911 if:  -You have signs of allergic reaction:   -Rash, hives, itching.   -Swollen, blistered, peeling skin.   -Swelling of face, lips, mouth, tongue or throat.   -Tightness of chest, trouble breathing, swallowing or talking     Call your doctor:  - If injection site gets red, warm, swollen, itchy or leaks fluid or pus.     (Leave band aid on your injection site for at least 2 hours and keep area clean and dry  - If you get sick or have symptoms of infection or are not feeling well for any reason.    (Wash your hands often, stay away from people who are sick)  - If you have side effects from your medication that do not go away or are bothersome.     (Refer to the teaching your nurse gave you for side effects to call your doctor about)    - Common side effects may include:  stuffy nose, headache, feeling tired, muscle aches, upset stomach  - Before receiving any vaccines     - Call the [facility] at   If:  - You get sick, are on antibiotics, have had a recent vaccine, have surgery or dental work and your doctor wants your visit rescheduled.  - You need to cancel and reschedule your visit for any reason. Call at least 2 days before your visit if you need to cancel.   - Your insurance changes before your next visit.    (We will need to get approval from your new insurance. This can take up to two weeks.)     The [facility] is opened Monday thru Friday. We are closed on weekends and holidays.   Voice  mail will take your call if the center is closed. If you leave a message please allow 24 hours for a call back during weekdays. If you leave a message on a weekend/holiday, we will call you back the next business day.

## 2024-03-22 NOTE — PROGRESS NOTES
[facility]   Infusion Clinic Note   Date: 2024   Name: Javier Calderón  : 1950   MRN: [id number]         Reason for Visit: Injections (Every 6 months Prolia 60mg subcutaneous injection)         Visit Type: INJECTION       Ordered By: Andrew Rodgers MD      Accompanied by: Self      Diagnosis: Osteoporosis without current pathological fracture, unspecified osteoporosis type       Allergies:   Allergies as of 2024 - Reviewed 2024   Allergen Reaction Noted    Bee venom protein (honey bee) Unknown 2023         Current Medications has a current medication list which includes the following prescription(s): aspirin, b complex, calcium carbonate, cholecalciferol, denosumab, estradiol, fluticasone, fluticasone, multivitamin, mupirocin, and nitroglycerin.       Vitals:   Vitals:    24 1000   BP: 97/67   Pulse: 77   Resp: 18   Temp: 36.7 °C (98 °F)   TempSrc: Temporal   SpO2: 96%   Weight: 51 kg (112 lb 7 oz)             Infusion Pre-procedure Checklist:   - Allergies reviewed: yes   - Medications reviewed: yes       - Previous reaction to current treatment: no      Assess patient for the concerns below. Document provider notification as appropriate.  - Active or recent infection with/without current antibiotic use: no  - Recent or planned invasive dental work: no  - Recent or planned surgeries: no  - Recently received or plans to receive vaccinations: yes Received 2nd Shingles 2 weeks ago  - Has treatment related toxicities: no  - Is pregnant:  no      Pain: 0   - Is the pain different from normal: n/a   - Is the pain tolerable: n/a   - Is your Doctor aware:  n/a               Fall Risk Screening: Heather Fall Risk  History of Falling, Immediate or Within 3 Months: No  Ambulatory Aid: Walks without aid/bedrest/nurse assist  Intravenous Therapy/Heparin Lock: No  Gait/Transferring: Normal/bedrest/immobile  Mental Status: Oriented to own ability       Review Of  Systems:  Review of Systems   Constitutional:  Negative for appetite change, chills, fatigue, fever and unexpected weight change.   HENT:   Negative for hearing loss, mouth sores, sore throat, tinnitus and trouble swallowing.    Eyes:  Negative for eye problems.        Wears glasses   Respiratory:  Negative for cough, shortness of breath and wheezing.    Cardiovascular:  Negative for chest pain, leg swelling and palpitations.   Gastrointestinal:  Negative for abdominal pain, constipation, diarrhea, nausea and vomiting.   Genitourinary:  Negative for dysuria, frequency and hematuria.    Musculoskeletal:  Negative for arthralgias and myalgias.   Skin:  Negative for itching, rash and wound.   Neurological:  Negative for dizziness, extremity weakness, headaches, light-headedness and numbness.   Hematological:  Does not bruise/bleed easily.   Psychiatric/Behavioral:  Negative for confusion, depression and sleep disturbance. The patient is not nervous/anxious.          Infusion Readiness:   - Assessment Concerns Related to Infusion: No  - Provider notified: n/a      Document Below Only If Indicated:   New Patient Education:    N/A (returning patient for continuation of therapy. Ongoing education provided as needed.)        Treatment Conditions & Drug Specific Questions:    Denosumab  (PROLIA. XGEVA)    (Unless otherwise specified on patient specific therapy plan):     TREATMENT CONDITIONS:  Unless otherwise specified on patient specific therapy plan HOLD and notify provider prior to proceeding with today's injection if patients:  o Calcium is LESS THAN 8.6 mg/dL OR  Ionized Calcium LESS THAN 1.1 mmol/L  o Recent or planned invasive dental procedure (within 4 weeks)    Lab Results   Component Value Date    CALCIUM 9.4 03/05/2024    PHOS 3.6 04/04/2023      Lab Results   Component Value Date    CAION 1.17 06/22/2018     Labs reviewed and patient meets treatment conditions? Yes    DRUG SPECIFIC QUESTIONS:  Is the patient  taking calcium and vitamin D? Yes  (Recommended)    Pt Instructed on following risks: (1) hypocalcemia, (2) osteonecrosis of the jaw, (3) atypical femoral fractures, (4) serious infections, and (5) dermatologic reactions?  Yes      REMINDER:  PREGNANCY CATEGORY X DRUG. OBTAIN NEGTATIVE PREGNANCY TEST PRIOR TO FIRST INFUSION FOR WOMEN OF CHILDBEARING ABILITY   REMS DRUG    Recommended Vitals/Observation:  Vitals: Obtain vitals prior to injection.  Observation: Patient may leave immediately following injection.        Weight Based Drug Calculations:    WEIGHT BASED DRUGS: NOT APPLICABLE / FLAT DOSE          Initiated By: Fe Fields LPN   Time: 10:13 AM     We administered denosumab.

## 2024-03-29 ENCOUNTER — HOSPITAL ENCOUNTER (OUTPATIENT)
Dept: CARDIOLOGY | Facility: CLINIC | Age: 74
Discharge: HOME | End: 2024-03-29
Payer: MEDICARE

## 2024-03-29 DIAGNOSIS — I48.19 ATRIAL FIBRILLATION, PERSISTENT (MULTI): ICD-10-CM

## 2024-04-15 ENCOUNTER — LAB (OUTPATIENT)
Dept: LAB | Facility: LAB | Age: 74
End: 2024-04-15
Payer: MEDICARE

## 2024-04-15 DIAGNOSIS — K21.9 GASTROESOPHAGEAL REFLUX DISEASE, UNSPECIFIED WHETHER ESOPHAGITIS PRESENT: ICD-10-CM

## 2024-04-15 DIAGNOSIS — R41.3 MEMORY CHANGE: ICD-10-CM

## 2024-04-15 DIAGNOSIS — R53.83 FATIGUE, UNSPECIFIED TYPE: ICD-10-CM

## 2024-04-15 DIAGNOSIS — M81.0 OSTEOPOROSIS, UNSPECIFIED OSTEOPOROSIS TYPE, UNSPECIFIED PATHOLOGICAL FRACTURE PRESENCE: ICD-10-CM

## 2024-04-15 DIAGNOSIS — E78.00 ELEVATED LDL CHOLESTEROL LEVEL: ICD-10-CM

## 2024-04-15 DIAGNOSIS — R71.8 ELEVATED MCV: ICD-10-CM

## 2024-04-15 DIAGNOSIS — I48.91 ATRIAL FIBRILLATION, UNSPECIFIED TYPE (MULTI): ICD-10-CM

## 2024-04-15 LAB
25(OH)D3 SERPL-MCNC: 43 NG/ML (ref 30–100)
ALBUMIN SERPL BCP-MCNC: 4.4 G/DL (ref 3.4–5)
ALP SERPL-CCNC: 65 U/L (ref 33–136)
ALT SERPL W P-5'-P-CCNC: 23 U/L (ref 7–45)
ANION GAP SERPL CALC-SCNC: 14 MMOL/L (ref 10–20)
AST SERPL W P-5'-P-CCNC: 23 U/L (ref 9–39)
BILIRUB SERPL-MCNC: 0.6 MG/DL (ref 0–1.2)
BUN SERPL-MCNC: 34 MG/DL (ref 6–23)
CALCIUM SERPL-MCNC: 9.3 MG/DL (ref 8.6–10.6)
CHLORIDE SERPL-SCNC: 100 MMOL/L (ref 98–107)
CHOLEST SERPL-MCNC: 162 MG/DL (ref 0–199)
CHOLESTEROL/HDL RATIO: 2.8
CO2 SERPL-SCNC: 28 MMOL/L (ref 21–32)
CREAT SERPL-MCNC: 0.73 MG/DL (ref 0.5–1.05)
EGFRCR SERPLBLD CKD-EPI 2021: 86 ML/MIN/1.73M*2
ERYTHROCYTE [DISTWIDTH] IN BLOOD BY AUTOMATED COUNT: 13.6 % (ref 11.5–14.5)
GLUCOSE SERPL-MCNC: 88 MG/DL (ref 74–99)
HCT VFR BLD AUTO: 44.4 % (ref 36–46)
HDLC SERPL-MCNC: 58.8 MG/DL
HGB BLD-MCNC: 14.5 G/DL (ref 12–16)
LDLC SERPL CALC-MCNC: 48 MG/DL
MCH RBC QN AUTO: 32.8 PG (ref 26–34)
MCHC RBC AUTO-ENTMCNC: 32.7 G/DL (ref 32–36)
MCV RBC AUTO: 101 FL (ref 80–100)
NON HDL CHOLESTEROL: 103 MG/DL (ref 0–149)
NRBC BLD-RTO: 0 /100 WBCS (ref 0–0)
PLATELET # BLD AUTO: 180 X10*3/UL (ref 150–450)
POTASSIUM SERPL-SCNC: 4.5 MMOL/L (ref 3.5–5.3)
PROT SERPL-MCNC: 6 G/DL (ref 6.4–8.2)
RBC # BLD AUTO: 4.42 X10*6/UL (ref 4–5.2)
SODIUM SERPL-SCNC: 137 MMOL/L (ref 136–145)
TRIGL SERPL-MCNC: 277 MG/DL (ref 0–149)
TSH SERPL-ACNC: 2.11 MIU/L (ref 0.44–3.98)
VLDL: 55 MG/DL (ref 0–40)
WBC # BLD AUTO: 5.8 X10*3/UL (ref 4.4–11.3)

## 2024-04-15 PROCEDURE — 82607 VITAMIN B-12: CPT

## 2024-04-15 PROCEDURE — 86780 TREPONEMA PALLIDUM: CPT

## 2024-04-15 PROCEDURE — 84443 ASSAY THYROID STIM HORMONE: CPT

## 2024-04-15 PROCEDURE — 82306 VITAMIN D 25 HYDROXY: CPT

## 2024-04-15 PROCEDURE — 85027 COMPLETE CBC AUTOMATED: CPT

## 2024-04-15 PROCEDURE — 36415 COLL VENOUS BLD VENIPUNCTURE: CPT

## 2024-04-15 PROCEDURE — 80061 LIPID PANEL: CPT

## 2024-04-15 PROCEDURE — 80053 COMPREHEN METABOLIC PANEL: CPT

## 2024-04-19 ENCOUNTER — OFFICE VISIT (OUTPATIENT)
Dept: PRIMARY CARE | Facility: CLINIC | Age: 74
End: 2024-04-19
Payer: MEDICARE

## 2024-04-19 VITALS
BODY MASS INDEX: 19.46 KG/M2 | SYSTOLIC BLOOD PRESSURE: 97 MMHG | WEIGHT: 114 LBS | HEART RATE: 81 BPM | HEIGHT: 64 IN | DIASTOLIC BLOOD PRESSURE: 60 MMHG

## 2024-04-19 DIAGNOSIS — E78.00 HYPERCHOLESTEROLEMIA: Primary | ICD-10-CM

## 2024-04-19 DIAGNOSIS — R71.8 ELEVATED MCV: ICD-10-CM

## 2024-04-19 DIAGNOSIS — R41.3 MEMORY CHANGE: ICD-10-CM

## 2024-04-19 DIAGNOSIS — R53.83 FATIGUE, UNSPECIFIED TYPE: ICD-10-CM

## 2024-04-19 LAB
TREPONEMA PALLIDUM IGG+IGM AB [PRESENCE] IN SERUM OR PLASMA BY IMMUNOASSAY: NONREACTIVE
VIT B12 SERPL-MCNC: 632 PG/ML (ref 211–911)

## 2024-04-19 PROCEDURE — 99214 OFFICE O/P EST MOD 30 MIN: CPT | Performed by: INTERNAL MEDICINE

## 2024-04-19 PROCEDURE — 1159F MED LIST DOCD IN RCRD: CPT | Performed by: INTERNAL MEDICINE

## 2024-04-19 NOTE — PROGRESS NOTES
Subjective   Patient ID: Javier Calderón is a 74 y.o. female who presents for Follow-up.    HPI  Patient in for a visit  TG up was not fasting at the   She did have a fingerstick at the center , 128 and then when she got here  at the lab ws 277 did not eat     Wondering why protein is always low, eggs chicken salmon nuts   Her protein was 5.3 in 2018 and 122 lbs then got sick  Now 114 lbs now total protein 6.0     Her colon prep activated her SBO and GI had given her two courses of antibiotics   Nails not growing no energy mind was like a seive   Wondering if she needed to be tested for alzheimer       Review of Systems  General: Denies fever, chills, night sweats,  Eyes: Negative for recent visual changes  Ears, Nose, Throat :  Negative for hearing changes, sinus discomfort  Dermatologic: Negative for new skin conditions, rash  Respiratory: Negative for wheezing, shortness of breath, cough  Cardiovascular: Negative for chest pain, palpitations, or leg swelling  Gastrointestinal: Negative for nausea/vomiting, abdominal pain, changes in bowel habits  Genitourinary NEGATIVE FOR URINARY INCONTINENCE urgency , frequency, discomfort   Musculoskeletal: see hpi  Neurological: Negative for headaches, dizziness    Previous history  Past Medical History:   Diagnosis Date    Abnormal levels of other serum enzymes 03/08/2022    Elevated liver enzymes    Abnormal weight loss 09/27/2018    Weight loss    Dark stools 09/09/2023    Diarrhea, unspecified 12/31/2018    Diarrhea    Disorder of kidney and ureter, unspecified 11/08/2022    Abnormal renal function    Disorder of teeth and supporting structures, unspecified 02/22/2019    Dental disorder    Elevated liver enzymes 09/09/2023    Encounter for general adult medical examination without abnormal findings 03/08/2022    Encounter for Medicare annual wellness exam    Encounter for immunization 03/08/2022    Need for pneumococcal vaccination    Encounter for other screening for  malignant neoplasm of breast 02/21/2020    Screening for malignant neoplasm of breast    Encounter for other screening for malignant neoplasm of breast 06/09/2022    Breast screening    Encounter for screening mammogram for malignant neoplasm of breast 02/14/2018    Visit for screening mammogram    Gastro-esophageal reflux disease without esophagitis 06/23/2020    Esophageal reflux    Hypo-osmolality and hyponatremia 09/25/2019    Hyponatremia    Injury of peritoneum, initial encounter 08/23/2019    Peritoneal hematoma    Long term (current) use of anticoagulants 09/28/2018    Chronic anticoagulation    Otalgia, right ear 12/31/2018    Otalgia, right    Otalgia, unspecified ear 12/31/2018    Otalgia    Other abnormality of red blood cells 11/08/2022    Elevated MCV    Other conditions influencing health status 11/22/2021    Oral disorder    Other fatigue 03/19/2020    Fatigue    Other fecal abnormalities 06/29/2018    Dark stools    Other specified abnormal findings of blood chemistry 11/16/2022    Low vitamin D level    Other specified abnormal findings of blood chemistry 02/21/2022    Abnormal liver function test    Other specified diseases of intestine     Small intestinal bacterial overgrowth (SIBO)    Other specified symptoms and signs involving the circulatory and respiratory systems 11/17/2017    Abnormal finding of lung    Personal history of diseases of the blood and blood-forming organs and certain disorders involving the immune mechanism 03/18/2020    History of thrombocytosis    Personal history of diseases of the blood and blood-forming organs and certain disorders involving the immune mechanism 03/18/2020    History of thrombocytosis    Personal history of other specified conditions 12/20/2022    History of nausea    Personal history of other specified conditions 12/20/2022    History of abdominal pain    Pharyngoesophageal dysphagia 09/09/2023    Pure hypercholesterolemia, unspecified 11/08/2022     Elevated LDL cholesterol level    Pure hyperglyceridemia 11/16/2022    High triglycerides    Rash and other nonspecific skin eruption 12/30/2020    Rash    Toxic effect of venom of wasps, accidental (unintentional), initial encounter 09/18/2017    Wasp sting    Unspecified abnormal findings in urine 03/04/2022    Urine abnormality    Unspecified menopausal and perimenopausal disorder 01/23/2018    Menopausal and postmenopausal disorder     Past Surgical History:   Procedure Laterality Date    CATARACT EXTRACTION  01/08/2018    Cataract Surgery    CT ABDOMEN PELVIS ANGIOGRAM W AND/OR WO IV CONTRAST  6/12/2018    CT ABDOMEN PELVIS ANGIOGRAM W AND/OR WO IV CONTRAST 6/12/2018 [facility]    CT ABDOMEN PELVIS ANGIOGRAM W AND/OR WO IV CONTRAST  6/14/2018    CT ABDOMEN PELVIS ANGIOGRAM W AND/OR WO IV CONTRAST 6/14/2018 [facility]    IR ANGIOGRAM INFERIOR EPIGASTRIC PELVIC  6/14/2018    IR ANGIOGRAM INFERIOR EPIGASTRIC PELVIC 6/14/2018 [facility]    IR EMBOLIZATION LYMPH NODE Bilateral 6/14/2018    IR EMBOLIZATION LYMPH NODE 6/14/2018 [facility]    IR EMBOLIZATION LYMPH NODE Bilateral 6/14/2018    IR EMBOLIZATION LYMPH NODE 6/14/2018 [facility]    OTHER SURGICAL HISTORY  03/07/2018    Programming And Iterative Adjustment Of Implantable Loop Recorder    OTHER SURGICAL HISTORY  11/08/2022    Esophagogastroduodenoscopy     Social History     Tobacco Use    Smoking status: Never    Smokeless tobacco: Never   Vaping Use    Vaping status: Never Used   Substance Use Topics    Alcohol use: Never    Drug use: Never     Family History   Problem Relation Name Age of Onset    Coronary artery disease Mother      Atrial fibrillation Mother      Heart failure Mother      Hypertension Mother      Coronary artery disease Father      Other (CVA) Father      Heart failure Father      Hypertension Father      Other (Mitral valve replacement) Father      Polymyalgia rheumatica Brother      No Known Problems Son      Coronary  artery disease Other Multiple Family Members     Other (Diabetes mellitus) Other Family History     Lung cancer Other Family History     Brain cancer Other Family History     Pancreatic cancer Other Family History     Throat cancer Other Family History      Allergies   Allergen Reactions    Bee Venom Protein (Honey Bee) Unknown     Current Outpatient Medications   Medication Instructions    aspirin 81 mg EC tablet 1 tablet, oral, Daily    b complex 0.4 mg tablet 1 tablet, oral, Daily    calcium carbonate 600 mg calcium (1,500 mg) tablet 1 tablet, oral, Daily    cholecalciferol (Vitamin D-3) 25 MCG (1000 UT) tablet Take as directed.    denosumab (PROLIA) 60 mg, subcutaneous, Every 6 months    estradiol (Estrace) 0.01 % (0.1 mg/gram) vaginal cream vaginal, Apply a pea sized amount of vaginal area daily x2 weeks, then three times a week as needed<BR>    fluticasone (Cutivate) 0.05 % cream Topical, As needed, Apply inch.    fluticasone (Flonase) 50 mcg/actuation nasal spray 2 sprays, Each Nostril, Daily    MULTIVITAMIN ORAL 1 capsule, oral, Daily    mupirocin (Bactroban) 2 % ointment Topical, 3 times daily RT, Apply ointment topically to affected area.    nitroglycerin (NITROSTAT) 0.4 mg, sublingual, Every 5 min PRN, For up to 3 doses as needed. Call 911 if pain persists.       Objective       Physical Exam  Vital Signs: as recorded above  General: Well groomed, well nourished   Orientation:  Alert , oriented to time, place , and person   Mood and Affect:  Cooperative , no apparent distress normal affect  Skin: Good color, good turgor  Eyes: Extra ocular muscle movements intact, anicteric sclerae  Neck: Supple, full range of movement  Chest: Normal breath sounds, normal chest wall exam, symmetric, good air entry, clear to auscultation  Heart: Regular rate and rhythm, without murmur, gallop, or rubs  BACK:  no CTLS spine tenderness, no flank tenderness  Extremities: full range of movement  bilateral UE and bilateral LE,   no lower extremity edema  Neurological: Alert, oriented, cranial nerves II-XII grossly intact except for visual acuity  Sensation:  Intact   Gait: normal steady      Assessment/Plan   Javier Calderón is a 74 y.o. female who presents for the concerns below:    Problem List Items Addressed This Visit    None     HYPERCHOLESTEROLEMIA PLAN:  elevated not on medications  Follow low cholesterol diet, encouraged high omega 3 fatty acid intake in diet, exercise, weight control.  TG high but may be dietary and will recheck at another lab will [facility]     FATIGUE memory changes may have been from being sick and her memory has improved but still some tiredness  Will check b12 and rpr to complete the dementia work-up      Elevated MCV will check b12 level , if low will order monthly b12 shots if normal will have her take b complex vitamin or focus on folate       Discussed with:   Return in :    Portions of this note were generated using digital voice recognition software, and may contain grammatical errors       Andrew Ayoub MD  04/19/24  10:12 AM

## 2024-04-26 ENCOUNTER — PATIENT MESSAGE (OUTPATIENT)
Dept: PRIMARY CARE | Facility: CLINIC | Age: 74
End: 2024-04-26
Payer: MEDICARE

## 2024-04-26 DIAGNOSIS — R41.3 MEMORY CHANGE: Primary | ICD-10-CM

## 2024-05-06 ENCOUNTER — LAB (OUTPATIENT)
Dept: LAB | Facility: LAB | Age: 74
End: 2024-05-06
Payer: MEDICARE

## 2024-05-06 DIAGNOSIS — E78.00 HYPERCHOLESTEROLEMIA: ICD-10-CM

## 2024-05-06 LAB
CHOLEST SERPL-MCNC: 151 MG/DL (ref 0–199)
CHOLESTEROL/HDL RATIO: 2.5
HDLC SERPL-MCNC: 61.3 MG/DL
LDLC SERPL CALC-MCNC: 77 MG/DL
NON HDL CHOLESTEROL: 90 MG/DL (ref 0–149)
TRIGL SERPL-MCNC: 62 MG/DL (ref 0–149)
VLDL: 12 MG/DL (ref 0–40)

## 2024-05-06 PROCEDURE — 36415 COLL VENOUS BLD VENIPUNCTURE: CPT

## 2024-05-06 PROCEDURE — 80061 LIPID PANEL: CPT

## 2024-05-14 ENCOUNTER — OFFICE VISIT (OUTPATIENT)
Dept: PRIMARY CARE | Facility: CLINIC | Age: 74
End: 2024-05-14
Payer: MEDICARE

## 2024-05-14 VITALS — SYSTOLIC BLOOD PRESSURE: 104 MMHG | BODY MASS INDEX: 19.57 KG/M2 | WEIGHT: 114 LBS | DIASTOLIC BLOOD PRESSURE: 71 MMHG

## 2024-05-14 DIAGNOSIS — E55.9 VITAMIN D DEFICIENCY: ICD-10-CM

## 2024-05-14 DIAGNOSIS — R53.83 OTHER FATIGUE: ICD-10-CM

## 2024-05-14 DIAGNOSIS — Z00.00 MEDICARE ANNUAL WELLNESS VISIT, SUBSEQUENT: Primary | ICD-10-CM

## 2024-05-14 DIAGNOSIS — Z23 NEED FOR PNEUMOCOCCAL 20-VALENT CONJUGATE VACCINATION: ICD-10-CM

## 2024-05-14 DIAGNOSIS — Z00.00 ROUTINE GENERAL MEDICAL EXAMINATION AT HEALTH CARE FACILITY: ICD-10-CM

## 2024-05-14 PROCEDURE — 1159F MED LIST DOCD IN RCRD: CPT | Performed by: INTERNAL MEDICINE

## 2024-05-14 PROCEDURE — 1124F ACP DISCUSS-NO DSCNMKR DOCD: CPT | Performed by: INTERNAL MEDICINE

## 2024-05-14 PROCEDURE — 1036F TOBACCO NON-USER: CPT | Performed by: INTERNAL MEDICINE

## 2024-05-14 PROCEDURE — 90677 PCV20 VACCINE IM: CPT | Performed by: INTERNAL MEDICINE

## 2024-05-14 PROCEDURE — G0439 PPPS, SUBSEQ VISIT: HCPCS | Performed by: INTERNAL MEDICINE

## 2024-05-14 PROCEDURE — G0009 ADMIN PNEUMOCOCCAL VACCINE: HCPCS | Performed by: INTERNAL MEDICINE

## 2024-05-14 PROCEDURE — 99214 OFFICE O/P EST MOD 30 MIN: CPT | Performed by: INTERNAL MEDICINE

## 2024-05-14 PROCEDURE — 1170F FXNL STATUS ASSESSED: CPT | Performed by: INTERNAL MEDICINE

## 2024-05-14 ASSESSMENT — ACTIVITIES OF DAILY LIVING (ADL)
DOING_HOUSEWORK: INDEPENDENT
GROCERY_SHOPPING: INDEPENDENT
BATHING: INDEPENDENT
MANAGING_FINANCES: INDEPENDENT
TAKING_MEDICATION: INDEPENDENT
DRESSING: INDEPENDENT

## 2024-05-14 ASSESSMENT — PATIENT HEALTH QUESTIONNAIRE - PHQ9
1. LITTLE INTEREST OR PLEASURE IN DOING THINGS: NOT AT ALL
SUM OF ALL RESPONSES TO PHQ9 QUESTIONS 1 AND 2: 0
2. FEELING DOWN, DEPRESSED OR HOPELESS: NOT AT ALL

## 2024-05-14 ASSESSMENT — ENCOUNTER SYMPTOMS
DEPRESSION: 0
OCCASIONAL FEELINGS OF UNSTEADINESS: 0
LOSS OF SENSATION IN FEET: 0

## 2024-05-14 NOTE — PROGRESS NOTES
Subjective   Patient ID: Javier Calderón is a 74 y.o. female who presents for Follow-up.    HPI  Patient in for a visit  Her repeat TG was normal ,fluke she was fasting this time  Her work-up was negative rpr b12 tsh  Her vit d 43 taking 2000 daily   Review of Systems  General: Denies fever, chills, night sweats,  Eyes: Negative for recent visual changes  Ears, Nose, Throat :  Negative for hearing changes, sinus discomfort  Dermatologic: Negative for new skin conditions, rash  Respiratory: Negative for wheezing, shortness of breath, cough  Cardiovascular: Negative for chest pain, palpitations, or leg swelling  Gastrointestinal: Negative for nausea/vomiting, abdominal pain, changes in bowel habits  Genitourinary NEGATIVE FOR URINARY INCONTINENCE urgency , frequency, discomfort   Musculoskeletal: see hpi  Neurological: Negative for headaches, dizziness   Previous history  Past Medical History:   Diagnosis Date    Abnormal levels of other serum enzymes 03/08/2022    Elevated liver enzymes    Abnormal weight loss 09/27/2018    Weight loss    Dark stools 09/09/2023    Diarrhea, unspecified 12/31/2018    Diarrhea    Disorder of kidney and ureter, unspecified 11/08/2022    Abnormal renal function    Disorder of teeth and supporting structures, unspecified 02/22/2019    Dental disorder    Elevated liver enzymes 09/09/2023    Encounter for general adult medical examination without abnormal findings 03/08/2022    Encounter for Medicare annual wellness exam    Encounter for immunization 03/08/2022    Need for pneumococcal vaccination    Encounter for other screening for malignant neoplasm of breast 02/21/2020    Screening for malignant neoplasm of breast    Encounter for other screening for malignant neoplasm of breast 06/09/2022    Breast screening    Encounter for screening mammogram for malignant neoplasm of breast 02/14/2018    Visit for screening mammogram    Gastro-esophageal reflux disease without esophagitis  06/23/2020    Esophageal reflux    Hypo-osmolality and hyponatremia 09/25/2019    Hyponatremia    Injury of peritoneum, initial encounter 08/23/2019    Peritoneal hematoma    Long term (current) use of anticoagulants 09/28/2018    Chronic anticoagulation    Otalgia, right ear 12/31/2018    Otalgia, right    Otalgia, unspecified ear 12/31/2018    Otalgia    Other abnormality of red blood cells 11/08/2022    Elevated MCV    Other conditions influencing health status 11/22/2021    Oral disorder    Other fatigue 03/19/2020    Fatigue    Other fecal abnormalities 06/29/2018    Dark stools    Other specified abnormal findings of blood chemistry 11/16/2022    Low vitamin D level    Other specified abnormal findings of blood chemistry 02/21/2022    Abnormal liver function test    Other specified diseases of intestine     Small intestinal bacterial overgrowth (SIBO)    Other specified symptoms and signs involving the circulatory and respiratory systems 11/17/2017    Abnormal finding of lung    Personal history of diseases of the blood and blood-forming organs and certain disorders involving the immune mechanism 03/18/2020    History of thrombocytosis    Personal history of diseases of the blood and blood-forming organs and certain disorders involving the immune mechanism 03/18/2020    History of thrombocytosis    Personal history of other specified conditions 12/20/2022    History of nausea    Personal history of other specified conditions 12/20/2022    History of abdominal pain    Pharyngoesophageal dysphagia 09/09/2023    Pure hypercholesterolemia, unspecified 11/08/2022    Elevated LDL cholesterol level    Pure hyperglyceridemia 11/16/2022    High triglycerides    Rash and other nonspecific skin eruption 12/30/2020    Rash    Toxic effect of venom of wasps, accidental (unintentional), initial encounter 09/18/2017    Wasp sting    Unspecified abnormal findings in urine 03/04/2022    Urine abnormality    Unspecified  menopausal and perimenopausal disorder 01/23/2018    Menopausal and postmenopausal disorder     Past Surgical History:   Procedure Laterality Date    CATARACT EXTRACTION  01/08/2018    Cataract Surgery    CT ABDOMEN PELVIS ANGIOGRAM W AND/OR WO IV CONTRAST  6/12/2018    CT ABDOMEN PELVIS ANGIOGRAM W AND/OR WO IV CONTRAST 6/12/2018 [facility]    CT ABDOMEN PELVIS ANGIOGRAM W AND/OR WO IV CONTRAST  6/14/2018    CT ABDOMEN PELVIS ANGIOGRAM W AND/OR WO IV CONTRAST 6/14/2018 [facility]    IR ANGIOGRAM INFERIOR EPIGASTRIC PELVIC  6/14/2018    IR ANGIOGRAM INFERIOR EPIGASTRIC PELVIC 6/14/2018 [facility]    IR EMBOLIZATION LYMPH NODE Bilateral 6/14/2018    IR EMBOLIZATION LYMPH NODE 6/14/2018 [facility]    IR EMBOLIZATION LYMPH NODE Bilateral 6/14/2018    IR EMBOLIZATION LYMPH NODE 6/14/2018 [facility]    OTHER SURGICAL HISTORY  03/07/2018    Programming And Iterative Adjustment Of Implantable Loop Recorder    OTHER SURGICAL HISTORY  11/08/2022    Esophagogastroduodenoscopy     Social History     Tobacco Use    Smoking status: Never    Smokeless tobacco: Never   Vaping Use    Vaping status: Never Used   Substance Use Topics    Alcohol use: Never    Drug use: Never     Family History   Problem Relation Name Age of Onset    Coronary artery disease Mother      Atrial fibrillation Mother      Heart failure Mother      Hypertension Mother      Coronary artery disease Father      Other (CVA) Father      Heart failure Father      Hypertension Father      Other (Mitral valve replacement) Father      Polymyalgia rheumatica Brother      No Known Problems Son      Coronary artery disease Other Multiple Family Members     Other (Diabetes mellitus) Other Family History     Lung cancer Other Family History     Brain cancer Other Family History     Pancreatic cancer Other Family History     Throat cancer Other Family History      Allergies   Allergen Reactions    Bee Venom Protein (Honey Bee) Unknown     Current  Outpatient Medications   Medication Instructions    aspirin 81 mg EC tablet 1 tablet, oral, Daily    b complex 0.4 mg tablet 1 tablet, oral, Daily    calcium carbonate 600 mg calcium (1,500 mg) tablet 1 tablet, oral, Daily    cholecalciferol (Vitamin D-3) 25 MCG (1000 UT) tablet Take as directed.    denosumab (PROLIA) 60 mg, subcutaneous, Every 6 months    estradiol (Estrace) 0.01 % (0.1 mg/gram) vaginal cream vaginal, Apply a pea sized amount of vaginal area daily x2 weeks, then three times a week as needed<BR>    fluticasone (Cutivate) 0.05 % cream Topical, As needed, Apply inch.    fluticasone (Flonase) 50 mcg/actuation nasal spray 2 sprays, Each Nostril, Daily    MULTIVITAMIN ORAL 1 capsule, oral, Daily    mupirocin (Bactroban) 2 % ointment Topical, 3 times daily RT, Apply ointment topically to affected area.    nitroglycerin (NITROSTAT) 0.4 mg, sublingual, Every 5 min PRN, For up to 3 doses as needed. Call 911 if pain persists.       Objective       Physical Exam    Vital Signs: as recorded above  General: Well groomed, well nourished   Orientation:  Alert , oriented to time, place , and person   Mood and Affect:  Cooperative , no apparent distress normal affect  Skin: Good color, good turgor  Eyes: Extra ocular muscle movements intact, anicteric sclerae  Neck: Supple, full range of movement  Chest: Normal breath sounds, normal chest wall exam, symmetric, good air entry, clear to auscultation  Heart: Regular rate and rhythm, without murmur, gallop, or rubs  Abdomen soft nontender no masses felt no hepatosplenomegaly, no rebound or guarding  BACK:  no CTLS spine tenderness, no flank tenderness  Extremities: full range of movement  bilateral UE and bilateral LE,  no lower extremity edema  Neurological: Alert, oriented, cranial nerves II-XII grossly intact except for visual acuity  Sensation:  Intact   Gait: normal steady    Assessment/Plan   Javier Calderón is a 74 y.o. female who presents for the concerns  below:    Problem List Items Addressed This Visit    None    FATIGUE PLAN:  rest, hydration, sleep 6-7 hours at least of effective sleep, will check endocrine, metabolic causes of fatigue, infection screen - urinalysis if with symptoms.  Allergy check - trial of antihistamine otc . Follow-up visit as planned.  VITAMIN D DEFICIENCY low levels in the past  ,will check  if below 26 will restart high dose again for another course , if normal will continue dialy dose  Vitamin D3 , caution on other supplements or medications that may go through liver      Annual Wellness Visit  the risks and benefits of influenza vaccination were discussed with the patient, influenza vaccine is up to date this year  the risks and benefits of Prevnar 20  vaccination were discussed with the patient, Prevnar 20 vaccine is    up to date  the risks and benefits of pneumonia vaccination were discussed with the patient, pneumonia vaccine is     up to date   the risks and benefits of  Shingrix  vaccination were discussed with the patient, Shingrix  vaccine is   not     up to date there is a shortage of the vaccine  the risks and benefits of tetanus vaccination were discussed with the patient, tetanus vaccine is      up to date   Cardiovascular screening and counseling the risk and benefits of screening were discussed counseling on maintaining a healthy diet and due for lipid panel  Breast cancer screening and counseling , the risks and benefits of screening were discussed with the patient, and screening is current     order is in  Cervical cancer screening and counseling , the risks and benefits of screening were discussed with the patient, and screening is current     order is in  Osteoporosis screening and counseling was given on obtaining adequate amounts of calcium and vitamin D on a daily basis and weight bearing exercise such as walking  , the risks and benefits of screening were discussed with the patient, and screening is current      order is in  Colorectal cancer screening and counseling; the risks and benefits of screening were discussed with the patient, colon cancer screening is     up to date , order is in   Abdominal aortic aneurysm screening and counseling,  the risks and benefits of screening were discussed with the patient, screening is not indicated   Visual acuity / Glaucoma screening and counseling , the risks and benefits of vision screening were discussed with the patient, screening is current   HIV screening and Counseling, the risks and benefits of screening were discussed with the patient, screening is not indicated   Advance directive planning : needs to be updated information forms given to patient .  complete and up  to date   Other Preventive Counseling Provided :  fall risk reduction  seat belt use, sunscreen use , and increasing physical activity .  Patient Discussion:  plan discussed with the patient and follow-up visit needed          Discussed with:   Return in :    Portions of this note were generated using digital voice recognition software, and may contain grammatical errors       Andrew Ayoub MD  05/14/24  10:58 AM

## 2024-05-15 ENCOUNTER — OFFICE VISIT (OUTPATIENT)
Dept: NEUROLOGY | Facility: CLINIC | Age: 74
End: 2024-05-15
Payer: MEDICARE

## 2024-05-15 VITALS
BODY MASS INDEX: 19.22 KG/M2 | RESPIRATION RATE: 16 BRPM | DIASTOLIC BLOOD PRESSURE: 72 MMHG | HEIGHT: 64 IN | TEMPERATURE: 97.1 F | OXYGEN SATURATION: 96 % | SYSTOLIC BLOOD PRESSURE: 112 MMHG | WEIGHT: 112.6 LBS | HEART RATE: 80 BPM

## 2024-05-15 DIAGNOSIS — G31.84 MCI (MILD COGNITIVE IMPAIRMENT): Primary | ICD-10-CM

## 2024-05-15 PROCEDURE — 1036F TOBACCO NON-USER: CPT | Performed by: PSYCHIATRY & NEUROLOGY

## 2024-05-15 PROCEDURE — 99215 OFFICE O/P EST HI 40 MIN: CPT | Performed by: PSYCHIATRY & NEUROLOGY

## 2024-05-15 PROCEDURE — 1126F AMNT PAIN NOTED NONE PRSNT: CPT | Performed by: PSYCHIATRY & NEUROLOGY

## 2024-05-15 PROCEDURE — 1159F MED LIST DOCD IN RCRD: CPT | Performed by: PSYCHIATRY & NEUROLOGY

## 2024-05-15 PROCEDURE — 1123F ACP DISCUSS/DSCN MKR DOCD: CPT | Performed by: PSYCHIATRY & NEUROLOGY

## 2024-05-15 ASSESSMENT — ENCOUNTER SYMPTOMS
LOSS OF SENSATION IN FEET: 0
DEPRESSION: 0
OCCASIONAL FEELINGS OF UNSTEADINESS: 0

## 2024-05-15 ASSESSMENT — PAIN SCALES - GENERAL: PAINLEVEL: 0-NO PAIN
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
## 2024-05-28 ENCOUNTER — HOSPITAL ENCOUNTER (OUTPATIENT)
Dept: CARDIOLOGY | Facility: CLINIC | Age: 74
Discharge: HOME | End: 2024-05-28
Payer: MEDICARE

## 2024-05-28 DIAGNOSIS — I48.19 ATRIAL FIBRILLATION, PERSISTENT (MULTI): ICD-10-CM

## 2024-05-29 DIAGNOSIS — R41.89 COGNITIVE IMPAIRMENT: Primary | ICD-10-CM

## 2024-05-30 ENCOUNTER — HOSPITAL ENCOUNTER (OUTPATIENT)
Dept: RADIOLOGY | Facility: HOSPITAL | Age: 74
Discharge: HOME | End: 2024-05-30
Payer: MEDICARE

## 2024-05-30 DIAGNOSIS — G31.84 MCI (MILD COGNITIVE IMPAIRMENT): ICD-10-CM

## 2024-05-30 PROCEDURE — 70551 MRI BRAIN STEM W/O DYE: CPT | Performed by: RADIOLOGY

## 2024-05-30 PROCEDURE — 70551 MRI BRAIN STEM W/O DYE: CPT

## 2024-06-03 ENCOUNTER — HOSPITAL ENCOUNTER (OUTPATIENT)
Dept: CARDIOLOGY | Facility: CLINIC | Age: 74
Discharge: HOME | End: 2024-06-03
Payer: MEDICARE

## 2024-06-03 DIAGNOSIS — I48.19 ATRIAL FIBRILLATION, PERSISTENT (MULTI): ICD-10-CM

## 2024-06-10 ENCOUNTER — CLINICAL SUPPORT (OUTPATIENT)
Dept: SLEEP MEDICINE | Facility: CLINIC | Age: 74
End: 2024-06-10
Payer: MEDICARE

## 2024-06-10 DIAGNOSIS — R41.89 COGNITIVE IMPAIRMENT: ICD-10-CM

## 2024-06-10 DIAGNOSIS — G47.33 OBSTRUCTIVE SLEEP APNEA (ADULT) (PEDIATRIC): ICD-10-CM

## 2024-06-10 PROCEDURE — 95806 SLEEP STUDY UNATT&RESP EFFT: CPT | Performed by: INTERNAL MEDICINE

## 2024-06-10 NOTE — PROGRESS NOTES
Type of Study: HOME SLEEP STUDY - NOMAD     The patient received equipment and instructions for use of the [employer] [facility] Nomad HSAT device. The patient was instructed how to apply the effort belts, cannula, thermistor. It was also explained how the Nomad and oximeter components work.  The patient was asked to record their sleep for at least a 6-hour period.     The patient was informed of their responsibility for the device and acknowledged this by signing the HSAT device contract. The patient was asked to return the device on 6/11/2024 between the hours of 8am to 3pm to the [facility] in [place].     The patient was instructed to call 911 as usual for any medical- emergencies while at home.  The patient was also given a phone number for troubleshooting when using the device in case there were additional questions.

## 2024-06-19 ENCOUNTER — HOSPITAL ENCOUNTER (OUTPATIENT)
Dept: CARDIOLOGY | Facility: CLINIC | Age: 74
Discharge: HOME | End: 2024-06-19
Payer: MEDICARE

## 2024-06-19 ENCOUNTER — OFFICE VISIT (OUTPATIENT)
Dept: NEUROLOGY | Facility: CLINIC | Age: 74
End: 2024-06-19
Payer: MEDICARE

## 2024-06-19 VITALS
HEIGHT: 64 IN | HEART RATE: 72 BPM | DIASTOLIC BLOOD PRESSURE: 62 MMHG | OXYGEN SATURATION: 99 % | SYSTOLIC BLOOD PRESSURE: 99 MMHG | RESPIRATION RATE: 14 BRPM | BODY MASS INDEX: 19.12 KG/M2 | TEMPERATURE: 97.2 F | WEIGHT: 112 LBS

## 2024-06-19 DIAGNOSIS — G47.00 INSOMNIA, UNSPECIFIED TYPE: Primary | ICD-10-CM

## 2024-06-19 DIAGNOSIS — I48.19 PERSISTENT ATRIAL FIBRILLATION (MULTI): ICD-10-CM

## 2024-06-19 PROCEDURE — 1036F TOBACCO NON-USER: CPT | Performed by: PSYCHIATRY & NEUROLOGY

## 2024-06-19 PROCEDURE — 1123F ACP DISCUSS/DSCN MKR DOCD: CPT | Performed by: PSYCHIATRY & NEUROLOGY

## 2024-06-19 PROCEDURE — 99215 OFFICE O/P EST HI 40 MIN: CPT | Performed by: PSYCHIATRY & NEUROLOGY

## 2024-06-19 PROCEDURE — 1126F AMNT PAIN NOTED NONE PRSNT: CPT | Performed by: PSYCHIATRY & NEUROLOGY

## 2024-06-19 ASSESSMENT — PAIN SCALES - GENERAL: PAINLEVEL: 0-NO PAIN
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
## 2024-06-28 ENCOUNTER — CLINICAL SUPPORT (OUTPATIENT)
Dept: SLEEP MEDICINE | Facility: CLINIC | Age: 74
End: 2024-06-28
Payer: MEDICARE

## 2024-06-28 DIAGNOSIS — G47.00 INSOMNIA, UNSPECIFIED TYPE: ICD-10-CM

## 2024-06-29 NOTE — PROGRESS NOTES
[facility] TECH NOTE:     Patient: Javier Calderón   MRN//AGE: [id number]  1950  74 y.o.   Technologist: Caio Polanco   Room: 441B   Service Date: 2024        [facility]: No data recorded    TECHNOLOGIST SLEEP STUDY PROCEDURE NOTE:   This sleep study is being conducted according to the policies and procedures outlined by the AAS accreditation standards.  The sleep study procedure and processes involved during this appointment was explained to the patient/patient’s family, questions were answered. The patient/family verbalized understanding.      The patient is a 74 y.o. year old female scheduled for aDiagnostic PSG Split night with montage of:   PSG Master  . she arrived for her appointment.      The study that was ultimately completed was aDiagnostic PSG Split night with montage of:   PSG Master  .    The full study Was completed.  Patient questionnaires completed?: yes     Consents signed? yes    Initial Fall Risk Screening:     Javier has not fallen in the last 6 months. her did not result in injury. Javier does not have a fear of falling. He does not need assistance with sitting, standing, or walking. she does not need assistance walking in her home. she does not need assistance in an unfamiliar setting. The patient is notusing an assistive device.     Brief Study observations: PT did not qualify for split night due to AHI < 30 CMS.      Deviation to order/protocol and reason: N/A      If PAP, which was preferred mask/pressure/mode: N/A      Other:None    After the procedure, the patient/family was informed to ensure followup with ordering clinician for testing results.      Technologist: Caio Polanco

## 2024-07-24 ENCOUNTER — HOSPITAL ENCOUNTER (OUTPATIENT)
Dept: CARDIOLOGY | Facility: CLINIC | Age: 74
Discharge: HOME | End: 2024-07-24
Payer: MEDICARE

## 2024-07-24 DIAGNOSIS — I48.19 ATRIAL FIBRILLATION, PERSISTENT (MULTI): ICD-10-CM

## 2024-07-31 ENCOUNTER — APPOINTMENT (OUTPATIENT)
Dept: NEUROLOGY | Facility: CLINIC | Age: 74
End: 2024-07-31
Payer: MEDICARE

## 2024-08-02 ENCOUNTER — HOSPITAL ENCOUNTER (OUTPATIENT)
Dept: CARDIOLOGY | Facility: CLINIC | Age: 74
Discharge: HOME | End: 2024-08-02
Payer: MEDICARE

## 2024-08-02 ENCOUNTER — HOSPITAL ENCOUNTER (OUTPATIENT)
Dept: RADIOLOGY | Facility: CLINIC | Age: 74
Discharge: HOME | End: 2024-08-02
Payer: MEDICARE

## 2024-08-02 VITALS — BODY MASS INDEX: 19.12 KG/M2 | WEIGHT: 111.99 LBS | HEIGHT: 64 IN

## 2024-08-02 DIAGNOSIS — I48.19 ATRIAL FIBRILLATION, PERSISTENT (MULTI): ICD-10-CM

## 2024-08-02 DIAGNOSIS — Z12.31 SCREENING MAMMOGRAM FOR BREAST CANCER: ICD-10-CM

## 2024-08-02 PROCEDURE — 77067 SCR MAMMO BI INCL CAD: CPT

## 2024-08-14 ENCOUNTER — LAB (OUTPATIENT)
Dept: LAB | Facility: LAB | Age: 74
End: 2024-08-14
Payer: MEDICARE

## 2024-08-14 ENCOUNTER — APPOINTMENT (OUTPATIENT)
Dept: NEUROLOGY | Facility: CLINIC | Age: 74
End: 2024-08-14
Payer: MEDICARE

## 2024-08-14 DIAGNOSIS — R53.83 OTHER FATIGUE: ICD-10-CM

## 2024-08-14 DIAGNOSIS — E55.9 VITAMIN D DEFICIENCY: ICD-10-CM

## 2024-08-14 PROCEDURE — 82306 VITAMIN D 25 HYDROXY: CPT

## 2024-08-14 PROCEDURE — 84460 ALANINE AMINO (ALT) (SGPT): CPT

## 2024-08-14 PROCEDURE — 36415 COLL VENOUS BLD VENIPUNCTURE: CPT

## 2024-08-15 LAB
25(OH)D3 SERPL-MCNC: 52 NG/ML (ref 30–100)
ALT SERPL W P-5'-P-CCNC: 24 U/L (ref 7–45)

## 2024-08-26 ENCOUNTER — HOSPITAL ENCOUNTER (OUTPATIENT)
Dept: CARDIOLOGY | Facility: CLINIC | Age: 74
Discharge: HOME | End: 2024-08-26
Payer: MEDICARE

## 2024-08-26 DIAGNOSIS — I48.19 ATRIAL FIBRILLATION, PERSISTENT (MULTI): ICD-10-CM

## 2024-08-26 PROCEDURE — 93298 REM INTERROG DEV EVAL SCRMS: CPT

## 2024-08-26 PROCEDURE — 93298 REM INTERROG DEV EVAL SCRMS: CPT | Performed by: INTERNAL MEDICINE

## 2024-08-27 ENCOUNTER — APPOINTMENT (OUTPATIENT)
Dept: OBSTETRICS AND GYNECOLOGY | Facility: CLINIC | Age: 74
End: 2024-08-27
Payer: MEDICARE

## 2024-08-27 VITALS
HEIGHT: 64 IN | BODY MASS INDEX: 19.43 KG/M2 | WEIGHT: 113.8 LBS | DIASTOLIC BLOOD PRESSURE: 60 MMHG | SYSTOLIC BLOOD PRESSURE: 102 MMHG

## 2024-08-27 DIAGNOSIS — M62.89 PELVIC FLOOR DYSFUNCTION IN FEMALE: ICD-10-CM

## 2024-08-27 DIAGNOSIS — N95.8 GENITOURINARY SYNDROME OF MENOPAUSE: Primary | ICD-10-CM

## 2024-08-27 PROCEDURE — 1159F MED LIST DOCD IN RCRD: CPT | Performed by: NURSE PRACTITIONER

## 2024-08-27 PROCEDURE — 1036F TOBACCO NON-USER: CPT | Performed by: NURSE PRACTITIONER

## 2024-08-27 PROCEDURE — 1126F AMNT PAIN NOTED NONE PRSNT: CPT | Performed by: NURSE PRACTITIONER

## 2024-08-27 PROCEDURE — 3008F BODY MASS INDEX DOCD: CPT | Performed by: NURSE PRACTITIONER

## 2024-08-27 PROCEDURE — 99213 OFFICE O/P EST LOW 20 MIN: CPT | Performed by: NURSE PRACTITIONER

## 2024-08-27 PROCEDURE — 1123F ACP DISCUSS/DSCN MKR DOCD: CPT | Performed by: NURSE PRACTITIONER

## 2024-08-27 RX ORDER — TRIAMCINOLONE ACETONIDE 1 MG/G
1 CREAM TOPICAL
COMMUNITY
Start: 2024-04-17

## 2024-08-27 RX ORDER — ESTRADIOL 0.1 MG/G
CREAM VAGINAL
Qty: 42.5 G | Refills: 1 | Status: SHIPPED | OUTPATIENT
Start: 2024-08-27

## 2024-08-27 ASSESSMENT — ENCOUNTER SYMPTOMS
CARDIOVASCULAR NEGATIVE: 0
MUSCULOSKELETAL NEGATIVE: 0
HEMATOLOGIC/LYMPHATIC NEGATIVE: 0
NEUROLOGICAL NEGATIVE: 0
RESPIRATORY NEGATIVE: 0
EYES NEGATIVE: 0
GASTROINTESTINAL NEGATIVE: 0
PSYCHIATRIC NEGATIVE: 0
ALLERGIC/IMMUNOLOGIC NEGATIVE: 0
CONSTITUTIONAL NEGATIVE: 0
ENDOCRINE NEGATIVE: 0

## 2024-08-27 ASSESSMENT — PAIN SCALES - GENERAL: PAINLEVEL: 0-NO PAIN

## 2024-08-27 NOTE — PROGRESS NOTES
Subjective   Patient ID: Javier Calderón is a 74 y.o. female who presents for Med Refill.  HPI  GSM being treated with vaginal estradiol cream that she applies every odd day of the month  The other nights she applies coconut oil    Vulvar pain wakes her up at night; it is usually one area and if she applies pressure it will help, not consistent spot  Occasional pruritus    Review of Systems    Objective   Physical Exam  Genitourinary:     Comments: YAYA well managed with vaginal estradiol cream  No evidence of vulvodynia  However, she has pain throughout her pelvic floor with light palpation         Assessment/Plan   Diagnoses and all orders for this visit:  Genitourinary syndrome of menopause  -     estradiol (Estrace) 0.01 % (0.1 mg/gram) vaginal cream; Discard the applicator and apply a pea sized amount to the vaginal opening and just inside the vagina 3 times/week  Pelvic floor dysfunction in female  -     Referral to Physical Therapy; Future           CARLA Brady-CNP 08/27/24 9:54 AM

## 2024-09-09 NOTE — PROGRESS NOTES
Pt very happy with You tube video on how to relax her pelvic floor muscles and is no longer having pain or incontinence

## 2024-09-17 ENCOUNTER — OFFICE VISIT (OUTPATIENT)
Dept: CARDIOLOGY | Facility: HOSPITAL | Age: 74
End: 2024-09-17
Payer: MEDICARE

## 2024-09-17 VITALS
DIASTOLIC BLOOD PRESSURE: 65 MMHG | HEIGHT: 64 IN | SYSTOLIC BLOOD PRESSURE: 102 MMHG | BODY MASS INDEX: 19.29 KG/M2 | OXYGEN SATURATION: 97 % | WEIGHT: 113 LBS | HEART RATE: 77 BPM

## 2024-09-17 DIAGNOSIS — I48.0 PAROXYSMAL ATRIAL FIBRILLATION (MULTI): Primary | ICD-10-CM

## 2024-09-17 DIAGNOSIS — E78.00 ELEVATED LDL CHOLESTEROL LEVEL: ICD-10-CM

## 2024-09-17 LAB
ATRIAL RATE: 77 BPM
P AXIS: 69 DEGREES
P OFFSET: 197 MS
P ONSET: 141 MS
PR INTERVAL: 162 MS
Q ONSET: 222 MS
QRS COUNT: 13 BEATS
QRS DURATION: 82 MS
QT INTERVAL: 388 MS
QTC CALCULATION(BAZETT): 439 MS
QTC FREDERICIA: 421 MS
R AXIS: 82 DEGREES
T AXIS: 46 DEGREES
T OFFSET: 416 MS
VENTRICULAR RATE: 77 BPM

## 2024-09-17 PROCEDURE — 1036F TOBACCO NON-USER: CPT | Performed by: INTERNAL MEDICINE

## 2024-09-17 PROCEDURE — G2211 COMPLEX E/M VISIT ADD ON: HCPCS | Performed by: INTERNAL MEDICINE

## 2024-09-17 PROCEDURE — 1159F MED LIST DOCD IN RCRD: CPT | Performed by: INTERNAL MEDICINE

## 2024-09-17 PROCEDURE — 1160F RVW MEDS BY RX/DR IN RCRD: CPT | Performed by: INTERNAL MEDICINE

## 2024-09-17 PROCEDURE — 93005 ELECTROCARDIOGRAM TRACING: CPT | Performed by: INTERNAL MEDICINE

## 2024-09-17 PROCEDURE — 1123F ACP DISCUSS/DSCN MKR DOCD: CPT | Performed by: INTERNAL MEDICINE

## 2024-09-17 PROCEDURE — 99214 OFFICE O/P EST MOD 30 MIN: CPT | Performed by: INTERNAL MEDICINE

## 2024-09-17 PROCEDURE — 3008F BODY MASS INDEX DOCD: CPT | Performed by: INTERNAL MEDICINE

## 2024-09-17 NOTE — PROGRESS NOTES
Primary Care Physician: Andrew Ayoub MD  Date of Visit: 09/17/2024  9:40 AM EDT  Location of visit: [facility]     Chief Complaint:   Chief Complaint   Patient presents with    Follow-up        HPI / Summary:   Javier Calderón is a 74 y.o. female presents for followup.  She has no cardiac complaints.  She is physically active and exercises regularly walking and swimming without chest pain or shortness of breath.  She notes rare fleeting chest tightness when lying in bed and turning her head to the left.  The patient denies shortness of breath, palpitations, lightheadedness, syncope, orthopnea, paroxysmal nocturnal dyspnea, lower extremity edema, or bleeding problems.          Past Medical History:   Diagnosis Date    Abnormal levels of other serum enzymes 03/08/2022    Elevated liver enzymes    Abnormal weight loss 09/27/2018    Weight loss    Dark stools 09/09/2023    Diarrhea, unspecified 12/31/2018    Diarrhea    Disorder of kidney and ureter, unspecified 11/08/2022    Abnormal renal function    Disorder of teeth and supporting structures, unspecified 02/22/2019    Dental disorder    Elevated liver enzymes 09/09/2023    Encounter for general adult medical examination without abnormal findings 03/08/2022    Encounter for Medicare annual wellness exam    Encounter for immunization 03/08/2022    Need for pneumococcal vaccination    Encounter for other screening for malignant neoplasm of breast 02/21/2020    Screening for malignant neoplasm of breast    Encounter for other screening for malignant neoplasm of breast 06/09/2022    Breast screening    Encounter for screening mammogram for malignant neoplasm of breast 02/14/2018    Visit for screening mammogram    Gastro-esophageal reflux disease without esophagitis 06/23/2020    Esophageal reflux    Hypo-osmolality and hyponatremia 09/25/2019    Hyponatremia    Injury of peritoneum, initial encounter 08/23/2019    Peritoneal hematoma    Long  term (current) use of anticoagulants 09/28/2018    Chronic anticoagulation    Otalgia, right ear 12/31/2018    Otalgia, right    Otalgia, unspecified ear 12/31/2018    Otalgia    Other abnormality of red blood cells 11/08/2022    Elevated MCV    Other conditions influencing health status 11/22/2021    Oral disorder    Other fatigue 03/19/2020    Fatigue    Other fecal abnormalities 06/29/2018    Dark stools    Other specified abnormal findings of blood chemistry 11/16/2022    Low vitamin D level    Other specified abnormal findings of blood chemistry 02/21/2022    Abnormal liver function test    Other specified diseases of intestine     Small intestinal bacterial overgrowth (SIBO)    Other specified symptoms and signs involving the circulatory and respiratory systems 11/17/2017    Abnormal finding of lung    Personal history of diseases of the blood and blood-forming organs and certain disorders involving the immune mechanism 03/18/2020    History of thrombocytosis    Personal history of diseases of the blood and blood-forming organs and certain disorders involving the immune mechanism 03/18/2020    History of thrombocytosis    Personal history of other specified conditions 12/20/2022    History of nausea    Personal history of other specified conditions 12/20/2022    History of abdominal pain    Pharyngoesophageal dysphagia 09/09/2023    Pure hypercholesterolemia, unspecified 11/08/2022    Elevated LDL cholesterol level    Pure hyperglyceridemia 11/16/2022    High triglycerides    Rash and other nonspecific skin eruption 12/30/2020    Rash    Toxic effect of venom of wasps, accidental (unintentional), initial encounter 09/18/2017    Wasp sting    Unspecified abnormal findings in urine 03/04/2022    Urine abnormality    Unspecified menopausal and perimenopausal disorder 01/23/2018    Menopausal and postmenopausal disorder        Past Surgical History:   Procedure Laterality Date    CATARACT EXTRACTION  01/08/2018     Cataract Surgery    CT ABDOMEN PELVIS ANGIOGRAM W AND/OR WO IV CONTRAST  6/12/2018    CT ABDOMEN PELVIS ANGIOGRAM W AND/OR WO IV CONTRAST 6/12/2018 [facility]    CT ABDOMEN PELVIS ANGIOGRAM W AND/OR WO IV CONTRAST  6/14/2018    CT ABDOMEN PELVIS ANGIOGRAM W AND/OR WO IV CONTRAST 6/14/2018 [facility]    IR ANGIOGRAM INFERIOR EPIGASTRIC PELVIC  6/14/2018    IR ANGIOGRAM INFERIOR EPIGASTRIC PELVIC 6/14/2018 [facility]    IR EMBOLIZATION LYMPH NODE Bilateral 6/14/2018    IR EMBOLIZATION LYMPH NODE 6/14/2018 [facility]    IR EMBOLIZATION LYMPH NODE Bilateral 6/14/2018    IR EMBOLIZATION LYMPH NODE 6/14/2018 [facility]    OTHER SURGICAL HISTORY  03/07/2018    Programming And Iterative Adjustment Of Implantable Loop Recorder    OTHER SURGICAL HISTORY  11/08/2022    Esophagogastroduodenoscopy          Social History:   reports that she has never smoked. She has never used smokeless tobacco. She reports that she does not drink alcohol and does not use drugs.     Family History:  family history includes Atrial fibrillation in her mother; Brain cancer in an other family member; CVA in her father; Coronary artery disease in her father, mother, and another family member; Diabetes mellitus in an other family member; Heart failure in her father and mother; Hypertension in her father and mother; Lung cancer in an other family member; Mitral valve replacement in her father; No Known Problems in her son; Pancreatic cancer in an other family member; Polymyalgia rheumatica in her brother; Throat cancer in an other family member.      Allergies:  Allergies   Allergen Reactions    Bee Venom Protein (Honey Bee) Unknown       Outpatient Medications:  Current Outpatient Medications   Medication Instructions    aspirin 81 mg EC tablet 1 tablet, oral, Daily    b complex 0.4 mg tablet 1 tablet, oral, Daily    calcium carbonate 600 mg calcium (1,500 mg) tablet 1 tablet, oral, Daily    cholecalciferol (Vitamin D-3) 25 MCG  "(1000 UT) tablet Take as directed.    denosumab (PROLIA) 60 mg, subcutaneous, Every 6 months    estradiol (Estrace) 0.01 % (0.1 mg/gram) vaginal cream vaginal, Apply a pea sized amount of vaginal area daily x2 weeks, then three times a week as needed<BR>    estradiol (Estrace) 0.01 % (0.1 mg/gram) vaginal cream Discard the applicator and apply a pea sized amount to the vaginal opening and just inside the vagina 3 times/week    fluticasone (Cutivate) 0.05 % cream Topical, As needed, Apply inch.    fluticasone (Flonase) 50 mcg/actuation nasal spray 2 sprays, Each Nostril, Daily    MULTIVITAMIN ORAL 1 capsule, oral, Daily    mupirocin (Bactroban) 2 % ointment Topical, 3 times daily RT, Apply ointment topically to affected area.    nitroglycerin (NITROSTAT) 0.4 mg, sublingual, Every 5 min PRN, For up to 3 doses as needed. Call 911 if pain persists.    triamcinolone (Kenalog) 0.1 % cream 1 Application, Topical       Physical Exam:  Vitals:    09/17/24 0941   BP: 102/65   BP Location: Right arm   Pulse: 77   SpO2: 97%   Weight: 51.3 kg (113 lb)   Height: 1.626 m (5' 4\")     Wt Readings from Last 5 Encounters:   09/17/24 51.3 kg (113 lb)   08/27/24 51.6 kg (113 lb 12.8 oz)   08/02/24 50.8 kg (111 lb 15.9 oz)   06/19/24 50.8 kg (112 lb)   05/15/24 51.1 kg (112 lb 9.6 oz)     Body mass index is 19.4 kg/m².   General: Well-developed well-nourished in no acute distress  HEENT: Normocephalic atraumatic  Neck: Supple, JVP is normal negative hepatojugular reflux 2+ carotid pulses without bruit  Pulmonary: Normal respiratory effort, clear to auscultation  Cardiovascular: No right ventricular heave, normal S1 and S2, no murmurs rubs or gallops  Abdomen: Soft nontender nondistended  Extremities: Warm without edema 2+ radial pulses bilaterally 2+ dorsalis pedis pulses bilaterally  Neurologic: Alert and oriented x3  Psychiatric: Normal mood and affect     Last Labs:  CMP:  Recent Labs     04/15/24  1107 11/14/23  0927 06/01/23  1033 " "   139 134*   K 4.5 4.7 4.6    102 100   CO2 28 31 24   ANIONGAP 14 11 15   BUN 34* 29* 23   CREATININE 0.73 0.68 0.66   EGFR 86 >90  --    GLUCOSE 88 77 104*     Recent Labs     08/14/24  1342 04/15/24  1107 11/14/23  0927 04/04/23  1507   ALBUMIN  --  4.4 4.4 4.5   ALKPHOS  --  65 60 65   ALT 24 23 22 20   AST  --  23 24 22   BILITOT  --  0.6 0.6 0.5     CBC:  Recent Labs     04/15/24  1107 06/01/23  1033 04/04/23  1507   WBC 5.8 8.1 6.0   HGB 14.5 15.1 14.7   HCT 44.4 45.1 44.5    198 208   * 101* 101*     COAG:   Recent Labs     06/25/18  0929 06/24/18  1729   INR 1.7* 1.9*     HEME/ENDO:  Recent Labs     04/15/24  1107 03/07/23  1626 02/15/22  1649   TSH 2.11 2.18 1.74      CARDIAC: No results for input(s): \"LDH\", \"CKMB\", \"TROPHS\", \"BNP\" in the last 96327 hours.    No lab exists for component: \"CK\", \"CKMBP\"  Recent Labs     05/06/24  0836 04/15/24  1107 11/14/23  0927 04/04/23  1507 10/26/22  0924 03/07/22  0827   CHOL 151 162 169 191 163 223*   LDLF  --   --   --  97 79 142*   LDLCALC 77 48 86  --   --   --    HDL 61.3 58.8 65.6 68.0 63.0 68.9   TRIG 62 277* 88 132 105 61       Last Cardiology Tests:  ECG:  An electrocardiogram performed today that I reviewed shows normal sinus rhythm and is normal.    Echo:  Echocardiogram June 2018  CONCLUSIONS:   1. The left ventricular systolic function is normal with a 60-65% estimated ejection fraction.   2. There is a moderate pericardial effusion.   3. While the effusion appears larger there is no evidence of tamponade and the IVC appears collapsed.    Cath:      Stress Test:  Exercise stress echo February 9, 2018  Summary:   1. The resting ejection fraction was estimated at 55 to 60% with a peak exercise ejection fraction estimated at 65 to 70%.   2. Paroxysmal atrial flutter and fibrillation.   3. No clinical, echocardiographic or electrocardiographic evidence for ischemia at a maximal workload.   4. The adequate level of stress was " achieved.         Cardiac Imaging:  CT cardiac scoring May 27, 2022  FINDINGS:  The score and distribution of calcium in the coronary arteries is as  follows:     LM             0  LAD           0  LCx            0  RCA           0     Total         0     The visualized mid/lower ascending thoracic aorta measures 3.5 cm in  diameter. The heart is normal in size. No pericardial effusion is  present.      Assessment/Plan   Diagnoses and all orders for this visit:  Paroxysmal atrial fibrillation (Multi)  -     ECG 12 lead (Clinic Performed)  Elevated LDL cholesterol level    In summary Ms. Calderón is a pleasant 74 year-old white female with a past medical history significant for prior chronic chest pain syndrome without evidence of atherosclerotic disease on CT angiography, hyperlipidemia with a CT calcium score of zero, paroxysmal episodes of atrial flutter and fibrillation status post ablation and loop recorder, and a family history of coronary disease.  She is asymptomatic from a cardiac perspective.  She notes a rare chest discomfort that is noncardiac.  She should continue her current cardiovascular medications.  Her blood pressure and lipids are at goal.  She will follow-up with electrophysiology as scheduled.  I will see her back in follow-up in 1 year.      Orders:  Orders Placed This Encounter   Procedures    ECG 12 lead (Clinic Performed)      Followup Appts:  Future Appointments   Date Time Provider Department Center   9/26/2024 10:00 AM [facility]   10/9/2024 11:00 AM Rafal Guzman MD PhD [id number] [facility]   10/25/2024 11:00 AM [id number]   11/13/2024 10:30 AM Andrew Ayoub MD [id number] [facility]   3/7/2025 10:00 AM Andrew Rodgers MD [id number] [facility]           ____________________________________________________________  Erick Prado MD  [facility]  [facility]

## 2024-09-18 ENCOUNTER — LAB (OUTPATIENT)
Dept: LAB | Facility: LAB | Age: 74
End: 2024-09-18
Payer: MEDICARE

## 2024-09-18 DIAGNOSIS — M81.0 OSTEOPOROSIS WITHOUT CURRENT PATHOLOGICAL FRACTURE, UNSPECIFIED OSTEOPOROSIS TYPE: ICD-10-CM

## 2024-09-18 LAB
ALBUMIN SERPL BCP-MCNC: 4.5 G/DL (ref 3.4–5)
ALP SERPL-CCNC: 62 U/L (ref 33–136)
ALT SERPL W P-5'-P-CCNC: 23 U/L (ref 7–45)
ANION GAP SERPL CALC-SCNC: 16 MMOL/L (ref 10–20)
AST SERPL W P-5'-P-CCNC: 22 U/L (ref 9–39)
BILIRUB SERPL-MCNC: 0.6 MG/DL (ref 0–1.2)
BUN SERPL-MCNC: 33 MG/DL (ref 6–23)
CALCIUM SERPL-MCNC: 9.1 MG/DL (ref 8.6–10.6)
CHLORIDE SERPL-SCNC: 102 MMOL/L (ref 98–107)
CO2 SERPL-SCNC: 26 MMOL/L (ref 21–32)
CREAT SERPL-MCNC: 0.78 MG/DL (ref 0.5–1.05)
EGFRCR SERPLBLD CKD-EPI 2021: 80 ML/MIN/1.73M*2
GLUCOSE SERPL-MCNC: 91 MG/DL (ref 74–99)
POTASSIUM SERPL-SCNC: 4.7 MMOL/L (ref 3.5–5.3)
PROT SERPL-MCNC: 6.1 G/DL (ref 6.4–8.2)
SODIUM SERPL-SCNC: 139 MMOL/L (ref 136–145)

## 2024-09-18 PROCEDURE — 80053 COMPREHEN METABOLIC PANEL: CPT

## 2024-09-18 PROCEDURE — 36415 COLL VENOUS BLD VENIPUNCTURE: CPT

## 2024-09-21 NOTE — PROGRESS NOTES
[facility] Sleep Medicine  [facility]  [address]  [facility] [address]  [phone]     [facility] Sleep Medicine [facility]  New Visit Note    Virtual or Telephone Consent    An interactive audio and video telecommunication system which permits real time communications between the patient (at the originating site) and provider (at the distant site) was utilized to provide this telehealth service.   Verbal consent was requested and obtained from Javier Calderón on this date, 09/25/24 for a telehealth visit.        Subjective   Patient ID: Javier Calderón is a 74 y.o. female with past medical history significant for A-Fib, A flutter, and  OBSTRUCTIVE SLEEP APNEA.     9/25/2024: The patient had a virtual visit with me for comprehensive sleep medicine evaluation due to sleep apnea to review her MILD sleep study to treat her MILD  sleep apnea. The desensitization strategy, 30-day free mask return policy, and insurance requirements are all discussed with the patient. The patient verbalized understanding it. Her ESS: 15, and ROCHELLE  is 21 today.    HPI  Patient had been having these symptoms for the past 10 years. Patient had PSG in 2024 which showed SHANE but no CPAP started yet.      SLEEP STUDY HISTORY: (personally reviewed raw data such as interpretation report, data sheet, hypnogram, and titration table if available and applicable)  6/28/24: BMI: 19.2, RDI 3%: 13.4/hr, RDI 4%: 7/hr, Sadiq: 85%, < 88%: 2.5 minutes    SLEEP-WAKE SCHEDULE  Bedtime: 8 PM on weekdays, same on weekends  Subjective sleep latency: 20-60 minutes  Problems falling asleep: No  Number of awakenings: 4-5 times per night spontaneously for unknown reasons  Falls back asleep in  minutes  Problems staying asleep: Yes  Final wake time: 4 AM on weekdays, same on weekends  Shift work: No  Naps: No  Average sleep duration (excluding naps): 6-7 hours    SLEEP  ENVIRONMENT  Sleep location: bed   Sleep status: sleeps alone  Room is dark:  Yes  Room is quiet: Yes  Room is cool: Yes  Bed comfort: good    SLEEP HABITS:   Activities before bedtime: read a book  Activities in bed: no  Preferred sleep position: right side    SLEEP ROS:  Night symptoms: POSITIVE for snoring, witnessed apnea, nasal congestion , mouth breathing, nocturnal cough, and nocturia  Morning symptoms: POSITIVE for unrefreshing sleep, morning dry mouth, and sleep inertia  Daytime symptoms POSITIVE for excessive daytime sleepiness, fatigue, and irritability in daytime  Hypersomnia / narcolepsy symptoms: Patient denies symptoms of a hypersomnolence disorder such as sleep paralysis, sleep-related hallucinations, and cataplexy.   RLS symptoms: Patient admits having urge to move legs that occurs at rest (sitting, getting into bed, or lying n bed), worse in the evening, and relieved temporarily with movement.   Frequency of RLS symptoms: 2-3/month  RLS symptoms affect sleep onset: Yes   RLS symptoms wakes patient up from sleep: Yes   Movements in sleep: POSITIVE for frequent leg kicks / jerks at night while asleep  Parasomnia symptoms: Patient denies symptoms of parasomnia such as sleepwalking, sleeptalking, sleep-eating, acting out dreams, and nightmares.     WEIGHT: stable    REVIEW OF SYSTEMS: All other systems have been reviewed and are negative.    PERTINENT SOCIAL HISTORY:  Occupation: retired  Smoking: No   ETOH: No   Marijuana: No   Caffeine: No  Sleep aids: No   Claustrophobia: No     PERTINENT PAST SURGICAL HISTORY:  non-contributory    PERTINENT FAMILY HISTORY:  sleep apnea- father, brother, sisters    Active Problems, Allergy List, Medication List, and PMH/PSH/FH/Social Hx have been reviewed and reconciled in chart. No significant changes unless documented in the pertinent chart section. Updates made when necessary.       Objective     REVIEW OF SYSTEMS  All other systems have been reviewed and are  negative.    ALLERGIES  Allergies   Allergen Reactions    Bee Venom Protein (Honey Bee) Unknown       MEDICATIONS  Current Outpatient Medications   Medication Sig Dispense Refill    aspirin 81 mg EC tablet Take 1 tablet (81 mg) by mouth once daily.      b complex 0.4 mg tablet Take 1 tablet by mouth once daily.      calcium carbonate 600 mg calcium (1,500 mg) tablet Take 1 tablet (1,500 mg) by mouth once daily.      cholecalciferol (Vitamin D-3) 25 MCG (1000 UT) tablet Take as directed.      denosumab (Prolia) 60 mg/mL syringe Inject 1 mL (60 mg total) under the skin every 6 months. 1 mL 1    estradiol (Estrace) 0.01 % (0.1 mg/gram) vaginal cream Insert into the vagina. Apply a pea sized amount of vaginal area daily x2 weeks, then three times a week as needed      estradiol (Estrace) 0.01 % (0.1 mg/gram) vaginal cream Discard the applicator and apply a pea sized amount to the vaginal opening and just inside the vagina 3 times/week 42.5 g 1    fluticasone (Cutivate) 0.05 % cream Apply topically if needed. Apply inch.      fluticasone (Flonase) 50 mcg/actuation nasal spray Administer 2 sprays into each nostril once daily.      MULTIVITAMIN ORAL Take 1 capsule by mouth once daily.      mupirocin (Bactroban) 2 % ointment Apply topically 3 times a day. Apply ointment topically to affected area.      nitroglycerin (Nitrostat) 0.4 mg SL tablet Place 1 tablet (0.4 mg) under the tongue every 5 minutes if needed for chest pain. For up to 3 doses as needed. Call 911 if pain persists.      triamcinolone (Kenalog) 0.1 % cream Apply 1 Application topically.       No current facility-administered medications for this visit.         Physical Exam  Constitutional:alert and oriented to time, place, and person    Assessment/Plan   Javier Calderón is a 74 y.o. female who is seen to evaluate for mild obstructive sleep apnea. The pathophysiology of sleep apnea, diagnostic testing (HST vs PSG), treatment options (PAP, oral appliance,  surgery, hypoglossal nerve stimulator called Inspire), and supportive management (weight loss, positional therapy, smoking cessation, avoidance of alcohol and sedatives) were discussed with the patient in detail. Risk factors of sleep apnea as well as cardiometabolic and neurocognitive sequelae associated with untreated sleep apnea were also discussed. Lastly, patient was advised to avoid driving vehicle or operating heavy machinery when sleepy.     Javier Calderón with the following problems:     # OBSTRUCTIVE SLEEP APNEA :  -Start 4-10  cmH20 auto CPAP with mask fitting through [employer].  -Sleep apnea and PAP therapy education were provided at length in the clinic today. Javier Calderón verbalized understanding.  -Emphasized diet, exercise, and weight loss in the clinic, as were non-supine sleep, avoiding alcohol in the late evening, and driving or operating heavy machinery when sleepy.  Javier Calderónverbalizes understanding of the above instructions and risks.    # CHRONIC SLEEP MAINTENANCE INSOMNIA:  -likely due to  untreated sleep apnea, nocturia, Restless Legs Syndrome.  -Sleep hygiene discuss in the clinic.    # ATRIAL FIBRILLATION:  -Denies headache, palpitation, and syncope in the clinic.  -Follows with PCP/ Cardiology     # XEROSTOMIA:  -Instruct Javier Calderónto purchase the Biotene gel to ease the dry mouth symptom,     # RESTLESS LEG SYNDROME: 2-3/monthly  -will reevaluate it after cpap.      RTC 1 month after receiving CPAP       All of patient's questions were answered. She verbalizes understanding and agreement with my assessment and plan.

## 2024-09-23 ENCOUNTER — APPOINTMENT (OUTPATIENT)
Dept: SLEEP MEDICINE | Facility: CLINIC | Age: 74
End: 2024-09-23
Payer: MEDICARE

## 2024-09-25 ENCOUNTER — APPOINTMENT (OUTPATIENT)
Dept: SLEEP MEDICINE | Facility: HOSPITAL | Age: 74
End: 2024-09-25
Payer: MEDICARE

## 2024-09-25 ENCOUNTER — APPOINTMENT (OUTPATIENT)
Dept: SLEEP MEDICINE | Facility: CLINIC | Age: 74
End: 2024-09-25
Payer: MEDICARE

## 2024-09-25 DIAGNOSIS — I48.92 ATRIAL FLUTTER, UNSPECIFIED TYPE (MULTI): ICD-10-CM

## 2024-09-25 DIAGNOSIS — I48.91 ATRIAL FIBRILLATION, UNSPECIFIED TYPE (MULTI): ICD-10-CM

## 2024-09-25 DIAGNOSIS — G47.33 OSA (OBSTRUCTIVE SLEEP APNEA): Primary | ICD-10-CM

## 2024-09-25 PROCEDURE — G2211 COMPLEX E/M VISIT ADD ON: HCPCS

## 2024-09-25 PROCEDURE — 1123F ACP DISCUSS/DSCN MKR DOCD: CPT

## 2024-09-25 PROCEDURE — 99204 OFFICE O/P NEW MOD 45 MIN: CPT

## 2024-09-25 PROCEDURE — 1160F RVW MEDS BY RX/DR IN RCRD: CPT

## 2024-09-25 PROCEDURE — 1036F TOBACCO NON-USER: CPT

## 2024-09-25 PROCEDURE — 1159F MED LIST DOCD IN RCRD: CPT

## 2024-09-25 ASSESSMENT — SLEEP AND FATIGUE QUESTIONNAIRES
HOW LIKELY ARE YOU TO NOD OFF OR FALL ASLEEP WHILE WATCHING TV: HIGH CHANCE OF DOZING
SLEEP_PROBLEM_NOTICEABLE_TO_OTHERS: NOT AT ALL NOTICEABLE
HOW LIKELY ARE YOU TO NOD OFF OR FALL ASLEEP WHILE SITTING QUIETLY AFTER LUNCH WITHOUT ALCOHOL: SLIGHT CHANCE OF DOZING
ESS-CHAD TOTAL SCORE: 15
DIFFICULTY_FALLING_ASLEEP: MODERATE
HOW LIKELY ARE YOU TO NOD OFF OR FALL ASLEEP WHILE SITTING AND READING: HIGH CHANCE OF DOZING
HOW LIKELY ARE YOU TO NOD OFF OR FALL ASLEEP WHILE LYING DOWN TO REST IN THE AFTERNOON WHEN CIRCUMSTANCES PERMIT: HIGH CHANCE OF DOZING
DIFFICULTY_STAYING_ASLEEP: VERY SEVERE
WAKING_TOO_EARLY: VERY SEVERE
HOW LIKELY ARE YOU TO NOD OFF OR FALL ASLEEP WHILE SITTING AND TALKING TO SOMEONE: WOULD NEVER DOZE
SLEEP_PROBLEM_INTERFERES_DAILY_ACTIVITIES: MUCH
SITING INACTIVE IN A PUBLIC PLACE LIKE A CLASS ROOM OR A MOVIE THEATER: HIGH CHANCE OF DOZING
HOW LIKELY ARE YOU TO NOD OFF OR FALL ASLEEP IN A CAR, WHILE STOPPED FOR A FEW MINUTES IN TRAFFIC: WOULD NEVER DOZE
SATISFACTION_WITH_CURRENT_SLEEP_PATTERN: VERY DISSATISFIED
WORRIED_DISTRESSED_DUE_TO_SLEEP: VERY MUCH NOTICEABLE
HOW LIKELY ARE YOU TO NOD OFF OR FALL ASLEEP WHEN YOU ARE A PASSENGER IN A CAR FOR AN HOUR WITHOUT A BREAK: MODERATE CHANCE OF DOZING

## 2024-09-25 NOTE — PATIENT INSTRUCTIONS
[facility] Sleep Medicine  [facility]  [address]  [facility] [address] [phone]       Thank you for coming to the Sleep Medicine Clinic today! Your sleep medicine provider today was: DUNIA Masters Below is a summary of your treatment plan, patient education, other important information, and our contact numbers.    Dear Ms Herrera Stephane       Your Sleep Provider Today: DUNIA Masters  Your Primary Care Physician: Andrew Ayoub MD   Your Referring Provider: No ref. provider found    Diagnosis: OBSTRUCTIVE SLEEP APNEA       Thank you for visiting  Sleep Medicine Clinic !     1. According to your symptom and sleep study report. I will order the new PAP device for you to control your sleep apnea, feel free to contact your DME.   If [employer] is your DME, you can reach them at [phone].   2. Please do not drive when you are sleepy and  start practicing the sleep hygiene as discussed in clinic.    3. Please start using Biotene to ease your dry mouth if needed.    4. FOR QUESTIONS AND CONCERNS:   a) : In case of problems with machine or mask interface, please contact your DME company first. DME is the company who provides you the machine and/or PAP supplies / accessories. If [employer] is your DME, you can reach them at [phone].   b): Please call my office with issues or questions: [phone] ([place]); [phone] ([place]); [phone] ([place])    If you have a CPAP or BiPAP machine at home, please bring the unit and all accessories including the power cord to your appointments unless I tell you otherwise. Please have knowledge of the DME company you worked with to receive your PAP device. If you have copies of any previous sleep testing completed outside of , please bring with you to clinic as well. This information will make our visits more productive.      If you are new to CPAP or BiPAP, please note the minimum usage insurance requires to continuing coverage for the equipment as noted by your DME company. Please discuss equipment issues (PAP unit, mask fit, humidification, etc.) with your DME company first.       In the event that you are running more than 10 minutes late to your appointment, I will kindly ask you to reschedule. Thanks.      TREATMENT PLAN     Follow-up Appointment:   Follow-up in 31 days after getting new machine.    PATIENT EDUCATION     OBSTRUCTIVE SLEEP APNEA (SHANE) is a sleep disorder where your upper airway muscles relax during sleep and the airway intermittently and repetitively narrows and collapses leading to partially blocked airway (hypopnea) or completely blocked airway (apnea) which, in turn, can disrupt breathing in sleep, lower oxygen levels while you sleep and cause night time wakings. Because both apnea and hypopnea may cause higher carbon dioxide or low oxygen levels, untreated SHANE can lead to heart arrhythmia, elevation of blood pressure, and make it harder for the body to consolidate memory and facilitate metabolism (leading to higher blood sugars at night). Frequent partial arousals occur during sleep resulting in sleep deprivation and daytime sleepiness. SHANE is associated with an increased risk of cardiovascular disease, stroke, hypertension, and insulin resistance. Moreover, untreated SHANE with excessive daytime sleepiness can increase the risk of motor vehicular accidents.    Below are conservative strategies for SHANE regardless of SHANE severity are:   Positional therapy - Avoid sleeping on your back.   Healthy diet and regular exercise to optimize weight is highly encouraged.   Avoid alcohol late in the evening and sedative-hypnotics as these substances can make sleep apnea worse.   Improve breathing through the nose with intranasal steroid spray, saline rinse, or antihistamines    Safety: Avoid driving vehicle and operating  heavy equipment while sleepy. Drowsy driving may lead to life-threatening motor vehicle accidents. A person driving while sleepy is 5 times more likely to have an accident. If you feel sleepy, pull over and take a short power nap (sleep for less than 30 minutes). Otherwise, ask somebody to drive you.    Treatment options for sleep apnea include weight management, positional therapy, Positive Airway Therapy (PAP) therapy, oral appliance therapy, hypoglossal nerve stimulator (Inspire) and select airway surgeries.    Starting Positive Airway Pressure (PAP): You were ordered a device to wear when you sleep called PAP (Positive Airway Pressure) to treat your sleep apnea. The order will be submitted to a durable medical equipment (DME) company who will arrange setting you up with the device. They will provide all the necessary equipment and discuss use and maintenance of the device with you as well as mask fitting and process of replacing / renewing PAP supplies or accessories. Once you get the machine, please start using it immediately. You may not be successful right away and that is okay. Sirisha be certain that you keep trying nightly and reach out to DME if you are struggling or having issues with machine usage.     *Please follow-up with me in 1-2 months of starting CPAP to see how well it is working for you and to do some troubleshooting if needed. Also, please bring all PAP equipment with you to follow up appointments unless told otherwise.     Important things to keep in mind as you start PAP:  Insurance will monitor your usage during the first 90 days. You should use your PAP - all night, every night, and including all naps (especially if naps are more than 30 minutes) for your health. The bare minimum is to use your PAP device while sleeping for at least 4 hours per day at least 5-6 days per week.. Otherwise, your PAP device will be reclaimed by your DME company at 90 days.  There are many masks to choose from to  wear with your PAP machine. If you are not comfortable with the first mask issued to you, call your DME company and ask for another option to try. You typically have a 30-day mask guarantee from the day you received your machine.   Discuss with your provider if you are having issues breathing with the machine or if the temperature or humidity feel uncomfortable.  Expect to have an adjustment period when you start your device. It helps to continuing wearing the machine every day for a period of time until you get more used to it. You can practice with wearing the mask alone if you need, then add in the PAP air pressure a few days later.   Reach out for help if you are struggling! The sleep medicine department can be reached at [phone]  We encourage you to download data monitoring apps to your phone. For [employer] - MyAir shey. For [employer] - DreamMapper. Both apps are available in the Shey store for free and are a great tool to monitor your progress with your PAP device night to night.    Tips for success with PAP machine usage:  Comfortable and well-fitting mask  Appropriate pressure on the machine  Using humidification  Support from bed partner and clinical team      Maintaining your CPAP/BPAP device:    The humidification chamber (aka water tank or water chamber) needs to be filled with distilled water to prevent buildup of white deposits in the future. If you cannot find distilled water, you can use tap water but expect to have white deposits buildup seen after prolonged use with tap water. If you start seeing white deposits on the water chamber, you can clean it by filling it with equal parts of distilled white vinegar and water. Let the vinegar-water mixture sit for 2 hours, and then rinse it with running tap water. Clean with soap and water then let it dry.     You should try to keep your machine clean in order to work well. Here are some tips to clean PAP supplies /  accessories:    Clean the humidification chamber (aka water tank) as well as your mask and tubing at least once a week with soap and water.   Alternatively, you can fill a sink or basin with warm water and add a little mild detergent, like Ivory dish soap. Gently wipe your supplies with the soapy water to free all the oils and dirt that may have collected. Once that's done, rinse these items with clean water until the soap is gone and let them air dry. You can hang your tubing over the curtain debby in your bathroom so that it dries.  The mask insert (part of the mask that has contact with your skin) needs to be cleaned with soap and water daily. Another option is to wipe them down with CPAP wipes or baby wipes.    You should replace your mask and tubing frequently in order to prevent bacteria buildup, machine damage, and mask seal issues. The older the mask and hose, the high likelihood that there is bacteria buildup in it especially if they are not cleaned regularly. Dirty filters damage machines because build-up of dust and contaminants can cause machine to over-heat, and in time, damage the motor of machine. Cushions lose their seal over time as most masks are made of plastic and silicone while headgear is made of neoprene. These materials will break down with age and frequent use. Here is the recommended replacement schedule for PAP supplies / accessories:    Twice a month- disposable filters and cushions for nasal mask or nasal pillows.  Once a month- cushion for full face mask  Every 3 months- mask with headgear and PAP tubing (standard or heated hose)  Every 6 months- reusable filter, water chamber, and chin strap     Other useful information:    Some people do not put water in the tank while other people prefers to put water in the tank to prevent mouth dryness. Try to experiment to determine which is more comfortable for you.   In general, new machines have 2 years warranty on parts while health insurance  allows you to have a new machine once every 5 years.     Common issues with PAP machine:    Mask gets dislodged when turning to the side: Consider getting a CPAP pillow or switching to a mask with hose on top.     Dry mouth:  Your machine has built-in humidifier that heats up the air to prevent dry mouth. It can be adjusted to your comfort. You can try that first and increase setting one level one night at a time to check which setting is comfortable and effective in lessening dry mouth. In some patients with heated hose, adjusting tube temperature to make air warmer can improve dry mouth. If dry mouth persists despite adjusting humidity or tube temperature setting, may apply OTC Biotene gel over the gums at bedtime.  If Biotene gel is not effective, consider trying XEROSTOM gel from [employer].  Also, eliminate or reduce dose of medications that can cause dry mouth if possible. Lastly, may try getting a separate room humidifier machine.    Airleaks: Please call DME as they may need to adjust your mask or refit you with a different kind or different size of mask. In addition, you can ask DME for tips on getting a good mask seal and mask fit.     Difficulty tolerating the mask: Contact your DME to try a different kind of mask and/or call office to get a referral to Sleep Psychologist for CPAP desensitization. CPAP desensitization technique is a set of strategies that helps patient cope with claustrophobia and anxiety related to wearing mask. Alternatively, we can do a daytime mini-sleep study called PAP-nap trial wherein you will try on different kinds of mask and the sleep technician will try different pressure settings on CPAP and BPAP machines to see which specific pressure is tolerable and comfortable for you.     Water droplets or moisture within the hose and/or mask: This is called rain-out and it is caused by condensation of too much heated humidity on the cooler walls of the hose. If you have rain-out, turn  "down humidity settings or get a heated hose. If you already have a heated hose, turn up the \"tube temperature\" of the heated hose. Alternatively, if you don't want to get a heated hose or warmer air, may wrap the CPAP hose with stockings to keep it somewhat warm. Also, you need to place the machine on the floor and lower the hose so that water won't travel upward towards your mask.     PAP desensitization techniques: If you have concerns about something being on your face at night, you can start by getting used to it before trying to sleep with it as follows:      Sit in a comfortable chair or bed. Connect the mask and hose to the CPAP/BPAP machine. Hold the mask on your face (without straps on) and turn on the machine. Practice breathing with the mask on while awake sitting and watching television, reading, or performing a sedentary activity during the day for 5-10 minutes and then take it off.  If tolerated, try again and gradually build up to longer periods of time. If not tolerated, try and try again until it is more comfortable as you become more desensitized. If you are able to use it for at least 20-30 minutes, move unto the next step.     Sit in a comfortable chair or bed. Connect the mask and hose to the CPAP/BPAP machine. Strap the mask on your head and turn on the machine. Practice breathing with the mask and headgear on while awake sitting and watching television, reading, or performing a sedentary activity. Start with 5-10 minutes and gradually increasing time until you can wear it comfortably for at least 20-30 minutes, then move to the next step.    Take a shorter daytime nap with machine turned on while you are in a reclined position in bed, sofa, or recliner. Start with 5-10 minute nap and gradually increase up to 30 minutes. It is not important whether you fall asleep or not. The goal is to rest comfortably with PAP machine on.     Reintroduce PAP machine into nighttime sleep. You can begin using " it a portion of the night and gradually increase up to entire night.     Proceed from one step to the next only when you are completely comfortable. If you feel any anxiety or discomfort, return to the previous step, then proceed again when comfortable.    Expect to “work” with your CPAP/BIPAP unit. It is important to try to relax when beginning CPAP/BIPAP therapy. Inhalation and exhalation should occur through the nose only. If you are unable to consistently breathe this way, do not panic or lose hope. There are other types of masks which allow you to breathe through your nose and/or your mouth. Also, in some patients, using intranasal steroid spray (e.g. Flonase or Nasocort or Fluticasone) 1 hour before bedtime and/or before putting on CPAP mask can help tolerate breathing through the mask.    Don't give up after a few attempts--some patients adjust quickly, while some patients need 3-4 weeks (or sometimes even longer) to be accustomed to CPAP therapy.  Contact your sleep medicine specialist if you have a significant change in weight since this may affect your pressure.    You can also go to the following EDUCATION WEBSITES for further information:   American Academy of Sleep Medicine http://sleepeducation.org  National Sleep Foundation: https://sleepfoundation.org  American Sleep Apnea Association: https://www.sleepapnea.org (for patients with sleep apnea)  Narcolepsy Network: https://www.narcolepsynetwork.org (for patients with narcolepsy)  [employer]: https://www.wakeupnarcolepsy.org (for patients with narcolepsy)  Hypersomnia Foundation: https://www.hypersomniafoundation.org (for patients with idiopathic hypersomnia)  RLS foundation: https://www.rls.org (for patients with restless leg syndrome)    IMPORTANT INFORMATION     Call 911 for medical emergencies.  Our offices are generally open from Monday-Friday, 8 am - 5 pm.   There are no supporting services by either the sleep doctors or their staff on  weekends and Holidays, or after 5 PM on weekdays.   If you need to get in touch with me, you may either call my office number or you can use [employer].  If a referral for a test, for CPAP, or for another specialist was made, and you have not heard about scheduling this within a week, please call scheduling at [phone] ([phone]).  If you are unable to make your appointment for clinic or an overnight study, kindly call the office or sleep testing center at least 48 hours in advance to cancel and reschedule.  If you are on CPAP, please bring your device's card and/or the device to each clinic appointment.   In case of problems with PAP machine or mask interface, please contact your DME (Durable Medical Equipment) company first. [employer] is the company who provides you the machine and/or PAP supplies.       PRESCRIPTIONS     We require 7 days advanced notice for prescription refills. If we do not receive the request in this time, we cannot guarantee that your medication will be refilled in time.    IMPORTANT PHONE NUMBERS     Sleep Medicine Clinic Fax: [phone]  Appointments (for Pediatric Sleep Clinic): [phone] - option 1  Appointments (for Adult Sleep Clinic): [phone] - option 2  Appointments (For Sleep Studies): [phone] - option 3  Behavioral Sleep Medicine: [phone]  Sleep Surgery: [phone]  Nutrition Service: [phone]  Weight management clinics with endocrinology: [phone]  Bariatric Services: [phone] (includes weight loss medications and weight loss surgery)  [facility]: [phone] (offers holistic approaches to weight management)  ENT (Otolaryngology): [phone]  Headache Clinic (Neurology): [phone]  Neurology: [phone]  Psychiatry: [phone]  Pulmonary Function Testing (PFT) Center: [phone]  Pulmonary Medicine: [phone]  [employer] (DME): [phone]      OUR SLEEP TESTING LOCATIONS     Our team will  "contact you to schedule your sleep study, however, you can contact us as follow:  Main Phone Line (scheduling only): [phone]), option 3  Adult and Pediatric Locations   [place] (6 years and older): [facility] - 4th floor ([address] [facility]) After hours line: [phone]  [facility] (Main campus: All ages): [facility], 6th floor. After hours line: [phone]   [place] (5 years and older; younger considered on case-by-case basis): [address]; [address], [address]. Scheduling  After hours line: [phone]   [place] (6 years and older): [address]; [address]; [address]   [place] (6 years and older): [address] [facility], [address]  After hours line: [phone]   [place] (13 years and older) in [place]: [address], 2nd floor  After hours line: [phone]   [place] (13 year and older): [address]  After hours line: [phone]     Adult Only Locations:   [place] (18 years and older): [address] [facility], 2nd floor   [place] (18 years and older): [address] [facility]; 4th floor  After hours line: [phone]  [facility] (18 years and older) at [place]: [address] [facility]  After hours line: [phone]     CONTACTING YOUR SLEEP MEDICINE PROVIDER AND SLEEP TEAM      For issues with your machine or mask interface, please call your DME provider first. DME stands for durable medical company. DME is the company who provides you the machine and/or PAP supplies / accessories.   To schedule, cancel, or reschedule SLEEP STUDY APPOINTMENTS, please call the Main Phone Line at [phone] - option 3.   To schedule, cancel, or reschedule CLINIC APPOINTMENTS, you can do it in \"MyChart\", call [phone] to speak with my  (Naty Davison), or call the Main Phone Line at [phone] - option 2  For CLINICAL QUESTIONS or MEDICATION " "REFILLS, please call direct line for Adult Sleep Nurses at [phone].   Lastly, you can also send a message directly to your provider through \"My Chart\", which is the email service through your  Records Account: [email or web address]       Here at [facility], we wish you a restful sleep!   "

## 2024-09-26 ENCOUNTER — APPOINTMENT (OUTPATIENT)
Dept: INFUSION THERAPY | Facility: CLINIC | Age: 74
End: 2024-09-26
Payer: MEDICARE

## 2024-09-26 VITALS
TEMPERATURE: 96.9 F | HEART RATE: 76 BPM | SYSTOLIC BLOOD PRESSURE: 95 MMHG | OXYGEN SATURATION: 97 % | DIASTOLIC BLOOD PRESSURE: 60 MMHG | RESPIRATION RATE: 16 BRPM

## 2024-09-26 DIAGNOSIS — M81.0 OSTEOPOROSIS WITHOUT CURRENT PATHOLOGICAL FRACTURE, UNSPECIFIED OSTEOPOROSIS TYPE: ICD-10-CM

## 2024-09-26 PROCEDURE — 96372 THER/PROPH/DIAG INJ SC/IM: CPT | Performed by: NURSE PRACTITIONER

## 2024-09-26 RX ORDER — ALBUTEROL SULFATE 0.83 MG/ML
3 SOLUTION RESPIRATORY (INHALATION) AS NEEDED
OUTPATIENT
Start: 2025-03-17

## 2024-09-26 RX ORDER — EPINEPHRINE 0.3 MG/.3ML
0.3 INJECTION SUBCUTANEOUS EVERY 5 MIN PRN
OUTPATIENT
Start: 2025-03-17

## 2024-09-26 RX ORDER — DIPHENHYDRAMINE HYDROCHLORIDE 50 MG/ML
50 INJECTION INTRAMUSCULAR; INTRAVENOUS AS NEEDED
OUTPATIENT
Start: 2025-03-17

## 2024-09-26 RX ORDER — FAMOTIDINE 10 MG/ML
20 INJECTION INTRAVENOUS ONCE AS NEEDED
OUTPATIENT
Start: 2025-03-17

## 2024-09-26 ASSESSMENT — ENCOUNTER SYMPTOMS
COUGH: 0
SHORTNESS OF BREATH: 0
PALPITATIONS: 0
DIZZINESS: 0
WHEEZING: 0
LEG SWELLING: 0
LIGHT-HEADEDNESS: 0
WOUND: 0
NUMBNESS: 0
EXTREMITY WEAKNESS: 0

## 2024-09-26 NOTE — PROGRESS NOTES
[facility]   Infusion Clinic Note   Date: 2024   Name: Javier Calderón  : 1950   MRN: [id number]          Reason for Visit: Injections (Every 6 months Prolia 60mg subcutaneous injection)         Today: We administered denosumab.       Visit Type: INJECTION       Ordered By: Andrew Rodgers MD       Accompanied by: Self       Diagnosis: Osteoporosis without current pathological fracture, unspecified osteoporosis type        Allergies:   Allergies as of 2024 - Reviewed 2024   Allergen Reaction Noted   • Bee venom protein (honey bee) Unknown 2023          Current Medications has a current medication list which includes the following prescription(s): aspirin, b complex, calcium carbonate, cholecalciferol, denosumab, estradiol, estradiol, fluticasone, fluticasone, multivitamin, mupirocin, nitroglycerin, and triamcinolone.       Vitals:   Vitals:    24 0945   BP: 95/60   Pulse: 76   Resp: 16   Temp: 36.1 °C (96.9 °F)   TempSrc: Temporal   SpO2: 97%             Infusion Pre-procedure Checklist:   - Allergies reviewed: yes   - Medications reviewed: yes       - Previous reaction to current treatment: no      Assess patient for the concerns below. Document provider notification as appropriate.  - Active or recent infection with/without current antibiotic use: no  - Recent or planned invasive dental work: no  - Recent or planned surgeries: no  - Recently received or plans to receive vaccinations: no  - Has treatment related toxicities: no  - Is pregnant:  no      Pain: 0   - Is the pain different from normal: n/a   - Is your Doctor aware:  n/a                Fall Risk Screening: Heather Fall Risk  History of Falling, Immediate or Within 3 Months: No  Ambulatory Aid: Walks without aid/bedrest/nurse assist  Intravenous Therapy/Heparin Lock: No  Gait/Transferring: Normal/bedrest/immobile  Mental Status: Oriented to own ability       Review Of Systems:  Review of  Systems   Respiratory:  Negative for cough, shortness of breath and wheezing.    Cardiovascular:  Negative for chest pain, leg swelling and palpitations.   Skin:  Negative for itching, rash and wound.   Neurological:  Negative for dizziness, extremity weakness, light-headedness and numbness.         ROS completed? Yes      Infusion Readiness:  - Assessment Concerns Related to Infusion: No  - Provider notified: n/a      Document Below Only If Indicated:   New Patient Education:    N/A (returning patient for continuation of therapy. Ongoing education provided as needed.)        Treatment Conditions & Drug Specific Questions:    Denosumab  (PROLIA. XGEVA)    (Unless otherwise specified on patient specific therapy plan):     TREATMENT CONDITIONS:  Unless otherwise specified on patient specific therapy plan HOLD and notify provider prior to proceeding with today's injection if patients:  o Calcium is LESS THAN 8.6 mg/dL OR  Ionized Calcium LESS THAN 1.1 mmol/L  o Recent or planned invasive dental procedure (within 4 weeks)    Lab Results   Component Value Date    CALCIUM 9.1 09/18/2024    PHOS 3.6 04/04/2023      Lab Results   Component Value Date    CAION 1.17 06/22/2018     Labs reviewed and patient meets treatment conditions? Yes    DRUG SPECIFIC QUESTIONS:  Is the patient taking calcium and vitamin D? Yes  (Recommended)    Pt Instructed on following risks: (1) hypocalcemia, (2) osteonecrosis of the jaw, (3) atypical femoral fractures, (4) serious infections, and (5) dermatologic reactions?  Yes      REMINDER:  PREGNANCY CATEGORY X DRUG. OBTAIN NEGTATIVE PREGNANCY TEST PRIOR TO FIRST INFUSION FOR WOMEN OF CHILDBEARING ABILITY   REMS DRUG    Recommended Vitals/Observation:  Vitals: Obtain vitals prior to injection.  Observation: Patient may leave immediately following injection.        Weight Based Drug Calculations:    WEIGHT BASED DRUGS: NOT APPLICABLE / FLAT DOSE          Initiated By: Fe Fields LPN

## 2024-09-30 DIAGNOSIS — L30.9 DERMATITIS: Primary | ICD-10-CM

## 2024-09-30 NOTE — TELEPHONE ENCOUNTER
I tried to do this through the prescription refill area but it will not let me access any of my prescriptions. I hope this doesn't make it harder for you.  Could you kindly refill a prescription that I use PRN and which has . It is Fluticasone propionate 0.05% cream for dermatological use.  It was originally prescribed by a dermatologist to be used with in place of TAC cream. He did not want me putting TAC cream on my face or any place that has thinner skin.  At that time he ordered me both. I didn't realize it had . I use the [facility] in [place] at the marketplace thank you so much

## 2024-10-01 RX ORDER — FLUTICASONE PROPIONATE 0.5 MG/G
CREAM TOPICAL AS NEEDED
Qty: 60 G | Refills: 0 | Status: SHIPPED | OUTPATIENT
Start: 2024-10-01

## 2024-10-07 ENCOUNTER — TELEMEDICINE (OUTPATIENT)
Dept: PRIMARY CARE | Facility: CLINIC | Age: 74
End: 2024-10-07
Payer: MEDICARE

## 2024-10-07 DIAGNOSIS — R05.9 COUGH, UNSPECIFIED TYPE: Primary | ICD-10-CM

## 2024-10-07 PROCEDURE — 99213 OFFICE O/P EST LOW 20 MIN: CPT | Performed by: INTERNAL MEDICINE

## 2024-10-07 PROCEDURE — 1123F ACP DISCUSS/DSCN MKR DOCD: CPT | Performed by: INTERNAL MEDICINE

## 2024-10-07 RX ORDER — BENZONATATE 100 MG/1
100 CAPSULE ORAL 3 TIMES DAILY PRN
Qty: 30 CAPSULE | Refills: 0 | Status: SHIPPED | OUTPATIENT
Start: 2024-10-07 | End: 2024-10-17

## 2024-10-07 RX ORDER — AZELASTINE 1 MG/ML
1 SPRAY, METERED NASAL 2 TIMES DAILY
Qty: 30 ML | Refills: 12 | Status: SHIPPED | OUTPATIENT
Start: 2024-10-07 | End: 2025-10-07

## 2024-10-07 ASSESSMENT — ENCOUNTER SYMPTOMS
SORE THROAT: 1
SWEATS: 0
FEVER: 0
WEIGHT LOSS: 0
SHORTNESS OF BREATH: 0
RHINORRHEA: 0
HEMOPTYSIS: 0
WHEEZING: 0
COUGH: 1
HEADACHES: 0
CHILLS: 0
HEARTBURN: 0
MYALGIAS: 0

## 2024-10-07 NOTE — PROGRESS NOTES
Subjective   Patient ID: Javier Calderón is a 74 y.o. female who presents for No chief complaint on file..    Cough  This is a recurrent problem. The current episode started in the past 7 days. The problem has been rapidly worsening. The problem occurs every few minutes. The cough is Non-productive. Associated symptoms include postnasal drip and a sore throat. Pertinent negatives include no chest pain, chills, ear congestion, ear pain, fever, headaches, heartburn, hemoptysis, myalgias, nasal congestion, rash, rhinorrhea, shortness of breath, sweats, weight loss or wheezing. The symptoms are aggravated by animals, cold air, dust, fumes and lying down. Risk factors for lung disease include animal exposure.       I performed this visit using real-time telehealth tools, including an audio/video connection between Javier Calderón and myself Dr Fox in office [facility], [place], [place]  Patient on with me on a virtual visit  In the fall gets this cough , 5 days now ,no fevers chills gets her steroids   Post nasal drip ,gets worse and then would have to take antibiotics   Had this many years ago and has it almost yearly no stomach upset   Review of Systems   Constitutional:  Negative for chills, fever and weight loss.   HENT:  Positive for postnasal drip and sore throat. Negative for ear pain and rhinorrhea.    Respiratory:  Positive for cough. Negative for hemoptysis, shortness of breath and wheezing.    Cardiovascular:  Negative for chest pain.   Gastrointestinal:  Negative for heartburn.   Musculoskeletal:  Negative for myalgias.   Skin:  Negative for rash.   Neurological:  Negative for headaches.     General: see hpi  Ears, Nose, Throat : see hpi  Dermatologic: Negative for new skin conditions, rash  Respiratory: see hpi  Cardiovascular: Negative for chest pain, palpitations, or leg swelling  Gastrointestinal: Negative for nausea/vomiting, abdominal pain, changes in bowel  habits  Neurological: Negative for dizziness see hpi headaches    Previous history  Past Medical History:   Diagnosis Date    Abnormal levels of other serum enzymes 03/08/2022    Elevated liver enzymes    Abnormal weight loss 09/27/2018    Weight loss    Dark stools 09/09/2023    Diarrhea, unspecified 12/31/2018    Diarrhea    Disorder of kidney and ureter, unspecified 11/08/2022    Abnormal renal function    Disorder of teeth and supporting structures, unspecified 02/22/2019    Dental disorder    Elevated liver enzymes 09/09/2023    Encounter for general adult medical examination without abnormal findings 03/08/2022    Encounter for Medicare annual wellness exam    Encounter for immunization 03/08/2022    Need for pneumococcal vaccination    Encounter for other screening for malignant neoplasm of breast 02/21/2020    Screening for malignant neoplasm of breast    Encounter for other screening for malignant neoplasm of breast 06/09/2022    Breast screening    Encounter for screening mammogram for malignant neoplasm of breast 02/14/2018    Visit for screening mammogram    Gastro-esophageal reflux disease without esophagitis 06/23/2020    Esophageal reflux    Hypo-osmolality and hyponatremia 09/25/2019    Hyponatremia    Injury of peritoneum, initial encounter 08/23/2019    Peritoneal hematoma    Long term (current) use of anticoagulants 09/28/2018    Chronic anticoagulation    Otalgia, right ear 12/31/2018    Otalgia, right    Otalgia, unspecified ear 12/31/2018    Otalgia    Other abnormality of red blood cells 11/08/2022    Elevated MCV    Other conditions influencing health status 11/22/2021    Oral disorder    Other fatigue 03/19/2020    Fatigue    Other fecal abnormalities 06/29/2018    Dark stools    Other specified abnormal findings of blood chemistry 11/16/2022    Low vitamin D level    Other specified abnormal findings of blood chemistry 02/21/2022    Abnormal liver function test    Other specified diseases  of intestine     Small intestinal bacterial overgrowth (SIBO)    Other specified symptoms and signs involving the circulatory and respiratory systems 11/17/2017    Abnormal finding of lung    Personal history of diseases of the blood and blood-forming organs and certain disorders involving the immune mechanism 03/18/2020    History of thrombocytosis    Personal history of diseases of the blood and blood-forming organs and certain disorders involving the immune mechanism 03/18/2020    History of thrombocytosis    Personal history of other specified conditions 12/20/2022    History of nausea    Personal history of other specified conditions 12/20/2022    History of abdominal pain    Pharyngoesophageal dysphagia 09/09/2023    Pure hypercholesterolemia, unspecified 11/08/2022    Elevated LDL cholesterol level    Pure hyperglyceridemia 11/16/2022    High triglycerides    Rash and other nonspecific skin eruption 12/30/2020    Rash    Toxic effect of venom of wasps, accidental (unintentional), initial encounter 09/18/2017    Wasp sting    Unspecified abnormal findings in urine 03/04/2022    Urine abnormality    Unspecified menopausal and perimenopausal disorder 01/23/2018    Menopausal and postmenopausal disorder     Past Surgical History:   Procedure Laterality Date    CATARACT EXTRACTION  01/08/2018    Cataract Surgery    CT ABDOMEN PELVIS ANGIOGRAM W AND/OR WO IV CONTRAST  6/12/2018    CT ABDOMEN PELVIS ANGIOGRAM W AND/OR WO IV CONTRAST 6/12/2018 [facility]    CT ABDOMEN PELVIS ANGIOGRAM W AND/OR WO IV CONTRAST  6/14/2018    CT ABDOMEN PELVIS ANGIOGRAM W AND/OR WO IV CONTRAST 6/14/2018 [facility]    IR ANGIOGRAM INFERIOR EPIGASTRIC PELVIC  6/14/2018    IR ANGIOGRAM INFERIOR EPIGASTRIC PELVIC 6/14/2018 [facility]    IR EMBOLIZATION LYMPH NODE Bilateral 6/14/2018    IR EMBOLIZATION LYMPH NODE 6/14/2018 [facility]    IR EMBOLIZATION LYMPH NODE Bilateral 6/14/2018    IR EMBOLIZATION LYMPH NODE 6/14/2018 [facility]    OTHER SURGICAL HISTORY  03/07/2018    Programming And Iterative Adjustment Of Implantable Loop Recorder    OTHER SURGICAL HISTORY  11/08/2022    Esophagogastroduodenoscopy     Social History     Tobacco Use    Smoking status: Never    Smokeless tobacco: Never   Vaping Use    Vaping status: Never Used   Substance Use Topics    Alcohol use: Never    Drug use: Never     Family History   Problem Relation Name Age of Onset    Coronary artery disease Mother      Atrial fibrillation Mother      Heart failure Mother      Hypertension Mother      Coronary artery disease Father      Other (CVA) Father      Heart failure Father      Hypertension Father      Other (Mitral valve replacement) Father      Sleep apnea Father      Sleep apnea Sister      Polymyalgia rheumatica Brother      Sleep apnea Brother      No Known Problems Son Akin     Coronary artery disease Other Multiple Family Members     Other (Diabetes mellitus) Other Family History     Lung cancer Other Family History     Brain cancer Other Family History     Pancreatic cancer Other Family History     Throat cancer Other Family History      Allergies   Allergen Reactions    Bee Venom Protein (Honey Bee) Unknown     Current Outpatient Medications   Medication Instructions    aspirin 81 mg EC tablet 1 tablet, oral, Daily    b complex 0.4 mg tablet 1 tablet, oral, Daily    calcium carbonate 600 mg calcium (1,500 mg) tablet 1 tablet, oral, Daily    cholecalciferol (Vitamin D-3) 25 MCG (1000 UT) tablet Take as directed.    denosumab (PROLIA) 60 mg, subcutaneous, Every 6 months    estradiol (Estrace) 0.01 % (0.1 mg/gram) vaginal cream vaginal, Apply a pea sized amount of vaginal area daily x2 weeks, then three times a week as needed<BR>    estradiol (Estrace) 0.01 % (0.1 mg/gram) vaginal cream Discard the applicator and apply a pea sized amount to the vaginal opening and just inside the vagina 3 times/week    fluticasone (Cutivate) 0.05 % cream Topical, As  needed, Apply inch.    fluticasone (Flonase) 50 mcg/actuation nasal spray 2 sprays, Each Nostril, Daily    MULTIVITAMIN ORAL 1 capsule, oral, Daily    mupirocin (Bactroban) 2 % ointment Topical, 3 times daily RT, Apply ointment topically to affected area.    nitroglycerin (NITROSTAT) 0.4 mg, sublingual, Every 5 min PRN, For up to 3 doses as needed. Call 911 if pain persists.    triamcinolone (Kenalog) 0.1 % cream 1 Application, Topical       Objective       Physical Exam  via CREATIV  General: Well groomed, well nourished , speaks full sentences  Alert Cooperative , no apparent distress   Skin: Good color does not appear dehydrated   Eyes: Extra ocular muscle movements intact, anicteric sclerae  Neck: Supple, with good range of motion looking behind her and moving head sideways to cough   Neurological: Alert, oriented        Assessment/Plan   Javier Calderón is a 74 y.o. female who presents for the concerns below:    Problem List Items Addressed This Visit    None    Taking her allegra, will try benzonate perles, already using fluticasone will add azelastine nasal spray          Discussed with:   Return in :    Portions of this note were generated using digital voice recognition software, and may contain grammatical errors       Andrew Ayoub MD  10/07/24  3:32 PM

## 2024-10-08 ENCOUNTER — TELEPHONE (OUTPATIENT)
Dept: PRIMARY CARE | Facility: CLINIC | Age: 74
End: 2024-10-08
Payer: MEDICARE

## 2024-10-08 DIAGNOSIS — R05.9 COUGH, UNSPECIFIED TYPE: Primary | ICD-10-CM

## 2024-10-08 RX ORDER — FLUTICASONE FUROATE AND VILANTEROL 100; 25 UG/1; UG/1
1 POWDER RESPIRATORY (INHALATION) DAILY
Qty: 60 EACH | Refills: 2 | Status: SHIPPED | OUTPATIENT
Start: 2024-10-08

## 2024-10-08 NOTE — TELEPHONE ENCOUNTER
[facility] pharmacy in [place] called. Pts insurance will not cover the Dulera. Pharmacy asked if we could send in either the Breo Ellipta or the Fluticasone Propionate/Salmeterol instead.

## 2024-10-09 ENCOUNTER — OFFICE VISIT (OUTPATIENT)
Dept: NEUROLOGY | Facility: CLINIC | Age: 74
End: 2024-10-09
Payer: MEDICARE

## 2024-10-09 ENCOUNTER — TELEPHONE (OUTPATIENT)
Dept: PRIMARY CARE | Facility: CLINIC | Age: 74
End: 2024-10-09
Payer: MEDICARE

## 2024-10-09 VITALS
HEART RATE: 80 BPM | TEMPERATURE: 97.3 F | BODY MASS INDEX: 19.91 KG/M2 | RESPIRATION RATE: 16 BRPM | OXYGEN SATURATION: 94 % | SYSTOLIC BLOOD PRESSURE: 92 MMHG | HEIGHT: 64 IN | WEIGHT: 116.6 LBS | DIASTOLIC BLOOD PRESSURE: 62 MMHG

## 2024-10-09 DIAGNOSIS — G31.84 MILD COGNITIVE IMPAIRMENT: Primary | ICD-10-CM

## 2024-10-09 DIAGNOSIS — R05.9 COUGH, UNSPECIFIED TYPE: Primary | ICD-10-CM

## 2024-10-09 PROCEDURE — 99215 OFFICE O/P EST HI 40 MIN: CPT | Performed by: PSYCHIATRY & NEUROLOGY

## 2024-10-09 PROCEDURE — 3008F BODY MASS INDEX DOCD: CPT | Performed by: PSYCHIATRY & NEUROLOGY

## 2024-10-09 PROCEDURE — 1126F AMNT PAIN NOTED NONE PRSNT: CPT | Performed by: PSYCHIATRY & NEUROLOGY

## 2024-10-09 PROCEDURE — 1159F MED LIST DOCD IN RCRD: CPT | Performed by: PSYCHIATRY & NEUROLOGY

## 2024-10-09 PROCEDURE — 1123F ACP DISCUSS/DSCN MKR DOCD: CPT | Performed by: PSYCHIATRY & NEUROLOGY

## 2024-10-09 RX ORDER — AMOXICILLIN 875 MG/1
875 TABLET, FILM COATED ORAL 2 TIMES DAILY
Qty: 14 TABLET | Refills: 0 | Status: SHIPPED | OUTPATIENT
Start: 2024-10-09 | End: 2024-10-16

## 2024-10-09 RX ORDER — METHYLPREDNISOLONE 4 MG/1
TABLET ORAL
Qty: 21 TABLET | Refills: 0 | Status: SHIPPED | OUTPATIENT
Start: 2024-10-09

## 2024-10-09 ASSESSMENT — PAIN SCALES - GENERAL: PAINLEVEL: 0-NO PAIN
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
## 2024-10-09 NOTE — TELEPHONE ENCOUNTER
Pt called stating she is feeling a lot worse from the last time you talked. She said you wanted her to call if she got worse. She is following the directions you told her but is still coughing so much almost to the point of vomiting. She was wondering if she could get Prednisone sent to [facility] in [place]. Please advise.

## 2024-10-10 ENCOUNTER — PATIENT MESSAGE (OUTPATIENT)
Dept: PRIMARY CARE | Facility: CLINIC | Age: 74
End: 2024-10-10
Payer: MEDICARE

## 2024-10-10 DIAGNOSIS — R05.9 COUGH, UNSPECIFIED TYPE: Primary | ICD-10-CM

## 2024-10-11 DIAGNOSIS — R05.9 COUGH, UNSPECIFIED TYPE: Primary | ICD-10-CM

## 2024-10-11 RX ORDER — BUDESONIDE AND FORMOTEROL FUMARATE DIHYDRATE 160; 4.5 UG/1; UG/1
2 AEROSOL RESPIRATORY (INHALATION)
Qty: 10.2 G | Refills: 5 | Status: SHIPPED | OUTPATIENT
Start: 2024-10-11 | End: 2025-10-11

## 2024-10-14 DIAGNOSIS — R05.9 COUGH, UNSPECIFIED TYPE: ICD-10-CM

## 2024-10-14 RX ORDER — BENZONATATE 100 MG/1
100 CAPSULE ORAL 3 TIMES DAILY PRN
Qty: 30 CAPSULE | Refills: 0 | Status: SHIPPED | OUTPATIENT
Start: 2024-10-14 | End: 2024-10-24

## 2024-10-14 RX ORDER — METHYLPREDNISOLONE 4 MG/1
TABLET ORAL
Qty: 21 TABLET | Refills: 0 | Status: SHIPPED | OUTPATIENT
Start: 2024-10-14

## 2024-10-14 NOTE — TELEPHONE ENCOUNTER
Pt reports feeling better but still has persistent, prductive cough.  Requesting refill of abx and tessalon pearles.

## 2024-10-15 RX ORDER — BUDESONIDE AND FORMOTEROL FUMARATE DIHYDRATE 160; 4.5 UG/1; UG/1
2 AEROSOL RESPIRATORY (INHALATION)
Qty: 10.2 G | Refills: 5 | Status: SHIPPED | OUTPATIENT
Start: 2024-10-15 | End: 2025-10-15

## 2024-10-24 PROBLEM — K08.9 DENTAL DISORDER: Status: RESOLVED | Noted: 2023-09-09 | Resolved: 2024-10-24

## 2024-10-24 PROBLEM — R09.89 ABNORMAL FINDING OF LUNG: Status: RESOLVED | Noted: 2023-09-09 | Resolved: 2024-10-24

## 2024-10-24 PROBLEM — I74.10 AORTIC THROMBUS (MULTI): Status: RESOLVED | Noted: 2023-09-09 | Resolved: 2024-10-24

## 2024-10-24 PROBLEM — R91.1 PULMONARY NODULE: Status: RESOLVED | Noted: 2023-09-09 | Resolved: 2024-10-24

## 2024-10-24 PROBLEM — S61.213A LACERATION OF LEFT MIDDLE FINGER WITHOUT FOREIGN BODY WITHOUT DAMAGE TO NAIL: Status: RESOLVED | Noted: 2023-09-09 | Resolved: 2024-10-24

## 2024-10-24 PROBLEM — S82.002A FRACTURE OF LEFT PATELLA: Status: RESOLVED | Noted: 2023-09-09 | Resolved: 2024-10-24

## 2024-10-24 PROBLEM — N28.9 ABNORMAL RENAL FUNCTION: Status: RESOLVED | Noted: 2023-09-09 | Resolved: 2024-10-24

## 2024-10-24 PROBLEM — R63.4 WEIGHT LOSS: Status: RESOLVED | Noted: 2023-09-09 | Resolved: 2024-10-24

## 2024-10-25 ENCOUNTER — OFFICE VISIT (OUTPATIENT)
Dept: CARDIOLOGY | Facility: HOSPITAL | Age: 74
End: 2024-10-25
Payer: MEDICARE

## 2024-10-25 VITALS
SYSTOLIC BLOOD PRESSURE: 103 MMHG | OXYGEN SATURATION: 98 % | BODY MASS INDEX: 18.78 KG/M2 | HEIGHT: 64 IN | HEART RATE: 66 BPM | DIASTOLIC BLOOD PRESSURE: 66 MMHG | WEIGHT: 110 LBS

## 2024-10-25 DIAGNOSIS — I48.4 ATYPICAL ATRIAL FLUTTER (MULTI): ICD-10-CM

## 2024-10-25 DIAGNOSIS — I48.0 PAROXYSMAL ATRIAL FIBRILLATION (MULTI): ICD-10-CM

## 2024-10-25 PROCEDURE — 93005 ELECTROCARDIOGRAM TRACING: CPT | Performed by: INTERNAL MEDICINE

## 2024-10-25 PROCEDURE — G2211 COMPLEX E/M VISIT ADD ON: HCPCS | Performed by: INTERNAL MEDICINE

## 2024-10-25 PROCEDURE — 1123F ACP DISCUSS/DSCN MKR DOCD: CPT | Performed by: INTERNAL MEDICINE

## 2024-10-25 PROCEDURE — 3008F BODY MASS INDEX DOCD: CPT | Performed by: INTERNAL MEDICINE

## 2024-10-25 PROCEDURE — 99214 OFFICE O/P EST MOD 30 MIN: CPT | Performed by: INTERNAL MEDICINE

## 2024-10-25 NOTE — PROGRESS NOTES
I had the pleasure seeing Javier Calderón     Chief Complaint   Patient presents with    Atrial Flutter    Atrial Fibrillation     She presents for her annual follow-up visit.          Current Outpatient Medications   Medication Instructions    aspirin 81 mg EC tablet 1 tablet, oral, Daily    azelastine (Astelin) 137 mcg (0.1 %) nasal spray 1 spray, Each Nostril, 2 times daily, Use in each nostril as directed    b complex 0.4 mg tablet 1 tablet, oral, Daily    benzonatate (TESSALON) 100 mg, oral, 3 times daily PRN, Do not crush or chew.    budesonide-formoteroL (Symbicort) 160-4.5 mcg/actuation inhaler 2 puffs, inhalation, 2 times daily RT, Rinse mouth with water after use to reduce aftertaste and incidence of candidiasis. Do not swallow.    budesonide-formoteroL (Symbicort) 160-4.5 mcg/actuation inhaler 2 puffs, inhalation, 2 times daily RT, Rinse mouth with water after use to reduce aftertaste and incidence of candidiasis. Do not swallow.    calcium carbonate 600 mg calcium (1,500 mg) tablet 1 tablet, oral, Daily    cholecalciferol (Vitamin D-3) 25 MCG (1000 UT) tablet Take as directed.    denosumab (PROLIA) 60 mg, subcutaneous, Every 6 months    estradiol (Estrace) 0.01 % (0.1 mg/gram) vaginal cream vaginal, Apply a pea sized amount of vaginal area daily x2 weeks, then three times a week as needed      estradiol (Estrace) 0.01 % (0.1 mg/gram) vaginal cream Discard the applicator and apply a pea sized amount to the vaginal opening and just inside the vagina 3 times/week    fluticasone (Cutivate) 0.05 % cream Topical, As needed, Apply inch.    fluticasone (Flonase) 50 mcg/actuation nasal spray 2 sprays, Each Nostril, Daily    fluticasone furoate-vilanteroL (Breo Ellipta) 100-25 mcg/dose inhaler 1 puff, inhalation, Daily    methylPREDNISolone (Medrol Dospak) 4 mg tablets Take as directed on package.    mometasone-formoterol (Dulera 100) 100-5 mcg/actuation inhaler 2 puffs, inhalation, 2 times daily RT, Rinse  "mouth with water after use to reduce aftertaste and incidence of candidiasis. Do not swallow.    MULTIVITAMIN ORAL 1 capsule, oral, Daily    mupirocin (Bactroban) 2 % ointment Topical, 3 times daily RT, Apply ointment topically to affected area.    nitroglycerin (NITROSTAT) 0.4 mg, sublingual, Every 5 min PRN, For up to 3 doses as needed. Call 911 if pain persists.    triamcinolone (Kenalog) 0.1 % cream 1 Application, Topical      Javier Calderón is a 74 y.o. with:     Chronic chest pain - presented in February of 2018 at [facility] for chest pain work up. He stress echo was negative.   atrial fibrillation/flutter- prior to the stress test she developed episode that was short lived  Long standing history of palpitations - s/p loop recorder 02/09/2018     Since the loop recorder implant she did have some palpations and initially the transmissions have not shown any abnormal rhythm.      However, recently she had very severe symptoms of sweating, palpitations, impending doom (\"I can not live like that\"). Loop recorder shows they are correlating with AF. She was in [place] and had 4 hour long episode of AF.      On 06/12/2018 she had PVI. During the procedure she became hypotensive. There was no significant pericardial effusion (she had small one before the procedure started), nor groin swelling. She was transfused empirically because her hypotension persisted. 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 She did required transfusion of a significant amount of blood.      July 2019: She has no more AF.  We will continue to monitor her with the loop recorder.      Sept 2022:  loop recorder. Replaced (May 2021).  No episodes of atrial fibrillation.     Cardiac TESTING         -Echo:  (June 2018) LVEF 60-65%.  Normal function      Objective   Physical Exam  Constitutional: alert and in no acute distress. "   Eyes: no erythema, swelling or discharge from the eye .   Neck: neck is supple, symmetric, trachea midline, no masses .   Pulmonary: no increased work of breathing or signs of respiratory distress  and lungs clear to auscultation.    Cardiovascular:  regular rhythm, normal S1 and S2, no murmurs , pedal pulses 2+ bilaterally  and no edema .   Abdomen: abdomen non-tender, no masses .   Skin: skin warm and dry, normal skin turgor .   Psychiatric judgment and insight is normal  and oriented to person, place and time .             Assessment/Plan   She is doing very well. Has no more atrial fibrillation. Loop recorder also shows no more episodes of atrial fibrillation. We will continue to monitor through the loop recorder and change the loop recorder when the battery expires.

## 2024-11-01 LAB
ATRIAL RATE: 66 BPM
P AXIS: 40 DEGREES
P OFFSET: 190 MS
P ONSET: 143 MS
PR INTERVAL: 160 MS
Q ONSET: 223 MS
QRS COUNT: 11 BEATS
QRS DURATION: 82 MS
QT INTERVAL: 400 MS
QTC CALCULATION(BAZETT): 419 MS
QTC FREDERICIA: 413 MS
R AXIS: 77 DEGREES
T AXIS: 60 DEGREES
T OFFSET: 423 MS
VENTRICULAR RATE: 66 BPM

## 2024-11-12 NOTE — PROGRESS NOTES
[facility]  [address]  [facility]  [address]  [facility] [address]     [facility]  Follow Up Visit Note       Subjective  Patient ID: Javier Calderón is a 74 y.o. female with past medical history significant for A-Fib, A flutter, and  OBSTRUCTIVE SLEEP APNEA.     11/21/24: UPDATED: The patient returned to the clinic to review her initial pap compliance. Her over 4 hours pap compliance rate was  97 %, and Her ESS  is 9  today. She c/o of still waking up during the night; she started taking Melatonin 3-5 mg, which made her extend to wake her up for 1.5 hours. I also instructed her to get 10 mg of melatonin 10 mg to prolong their sleep and to change the heating program to make her comfortable. The [facility] liaison, Yanique, provided her with an F30 small mask to try; she felt comfortable with it.     9/25/2024: The patient had a virtual visit with me for comprehensive sleep medicine evaluation due to sleep apnea to review her MILD sleep study to treat her MILD  sleep apnea. The desensitization strategy, 30-day free mask return policy, and insurance requirements are all discussed with the patient. The patient verbalized understanding it. Her ESS: 15, and ROCHELLE  is 21 today.     HPI  Patient had been having these symptoms for the past 10 years. Patient had PSG in 2024 which showed SHANE but no CPAP started yet.        SLEEP STUDY HISTORY: (personally reviewed raw data such as interpretation report, data sheet, hypnogram, and titration table if available and applicable)  6/28/24: BMI: 19.2, RDI 3%: 13.4/hr, RDI 4%: 7/hr, Sadiq: 85%, < 88%: 2.5 minutes     SLEEP-WAKE SCHEDULE  Bedtime: 8 PM on weekdays, same on weekends  Subjective sleep latency: 20-60 minutes  Problems falling asleep: No  Number of awakenings: 4-5 times per night spontaneously for unknown reasons  Falls back asleep in  minutes  Problems  staying asleep: Yes  Final wake time: 4 AM on weekdays, same on weekends  Shift work: No  Naps: No  Average sleep duration (excluding naps): 6-7 hours     SLEEP ENVIRONMENT  Sleep location: bed   Sleep status: sleeps alone  Room is dark:  Yes  Room is quiet: Yes  Room is cool: Yes  Bed comfort: good     SLEEP HABITS:   Activities before bedtime: read a book  Activities in bed: no  Preferred sleep position: right side     SLEEP ROS: (after cpap)  Night symptoms: Denies for snoring, witnessed apnea, nasal congestion , mouth breathing, nocturnal cough, and nocturia  Morning symptoms: Denies for unrefreshing sleep, morning dry mouth, and sleep inertia  Daytime symptoms Denies for excessive daytime sleepiness, fatigue, and irritability in daytime  Hypersomnia / narcolepsy symptoms: Patient denies symptoms of a hypersomnolence disorder such as sleep paralysis, sleep-related hallucinations, and cataplexy.   RLS symptoms: Patient admits having urge to move legs that occurs at rest (sitting, getting into bed, or lying n bed), worse in the evening, and relieved temporarily with movement.   Frequency of RLS symptoms: no more after cpap  RLS symptoms affect sleep onset: no  Movements in sleep: Delines for frequent leg kicks / jerks at night while asleep  Parasomnia symptoms: Patient denies symptoms of parasomnia such as sleepwalking, sleeptalking, sleep-eating, acting out dreams, and nightmares.      WEIGHT: stable     REVIEW OF SYSTEMS: All other systems have been reviewed and are negative.     PERTINENT SOCIAL HISTORY:  Occupation: retired RN  Smoking: No   ETOH: No   Marijuana: No   Caffeine: No  Sleep aids: No   Claustrophobia: No      PERTINENT PAST SURGICAL HISTORY:  non-contributory     PERTINENT FAMILY HISTORY:  sleep apnea- father, brother, sisters     Active Problems, Allergy List, Medication List, and PMH/PSH/FH/Social Hx have been reviewed and reconciled in chart. No significant changes unless documented in the  pertinent chart section. Updates made when necessary.            Objective    Vitals:    11/21/24 1109   BP: 110/68   Pulse: 73   Resp: 16   Temp: 35.7 °C (96.2 °F)   SpO2: 95%      REVIEW OF SYSTEMS  All other systems have been reviewed and are negative.     ALLERGIES  Allergies        Allergies   Allergen Reactions    Bee Venom Protein (Honey Bee) Unknown            MEDICATIONS  Current Medications          Current Outpatient Medications   Medication Sig Dispense Refill    aspirin 81 mg EC tablet Take 1 tablet (81 mg) by mouth once daily.        b complex 0.4 mg tablet Take 1 tablet by mouth once daily.        calcium carbonate 600 mg calcium (1,500 mg) tablet Take 1 tablet (1,500 mg) by mouth once daily.        cholecalciferol (Vitamin D-3) 25 MCG (1000 UT) tablet Take as directed.        denosumab (Prolia) 60 mg/mL syringe Inject 1 mL (60 mg total) under the skin every 6 months. 1 mL 1    estradiol (Estrace) 0.01 % (0.1 mg/gram) vaginal cream Insert into the vagina. Apply a pea sized amount of vaginal area daily x2 weeks, then three times a week as needed        estradiol (Estrace) 0.01 % (0.1 mg/gram) vaginal cream Discard the applicator and apply a pea sized amount to the vaginal opening and just inside the vagina 3 times/week 42.5 g 1    fluticasone (Cutivate) 0.05 % cream Apply topically if needed. Apply inch.        fluticasone (Flonase) 50 mcg/actuation nasal spray Administer 2 sprays into each nostril once daily.        MULTIVITAMIN ORAL Take 1 capsule by mouth once daily.        mupirocin (Bactroban) 2 % ointment Apply topically 3 times a day. Apply ointment topically to affected area.        nitroglycerin (Nitrostat) 0.4 mg SL tablet Place 1 tablet (0.4 mg) under the tongue every 5 minutes if needed for chest pain. For up to 3 doses as needed. Call 911 if pain persists.        triamcinolone (Kenalog) 0.1 % cream Apply 1 Application topically.          No current facility-administered medications for  this visit.              Physical Exam  Constitutional:alert and oriented to time, place, and person  Lungs: Clear to auscultation bilateral, no rales  Heart: Regular rate and rhythm, no murmurs     PAP Adherence:  DURABLE MEDICAL EQUIPMENT COMPANY: [employer]  Machine: THERAPY: RESMED AIRSENSE 11 PRESSURE SETTINGS: 4 cm H2O - 10 cm H2O  Mask: Dreamwear nasal pillow small   Issues with therapy: ISSUES WITH THERAPY: denies  Benefits with PAP: PERCEIVED BENEFITS OF PAP: refreshing sleep reduced daytime sleepiness decreased or no snoring better sleep quality    A PAP adherence download was obtained and data was reviewed personally today in clinic. (see scanned document in EPIC)          Assessment/Plan  Javier Calderón is a 74 y.o. female who is seen to evaluate for mild obstructive sleep apnea. The pathophysiology of sleep apnea, diagnostic testing (HST vs PSG), treatment options (PAP, oral appliance, surgery, hypoglossal nerve stimulator called Inspire), and supportive management (weight loss, positional therapy, smoking cessation, avoidance of alcohol and sedatives) were discussed with the patient in detail. Risk factors of sleep apnea as well as cardiometabolic and neurocognitive sequelae associated with untreated sleep apnea were also discussed. Lastly, patient was advised to avoid driving vehicle or operating heavy machinery when sleepy.      Javier Claderón with the following problems:     # OBSTRUCTIVE SLEEP APNEA :  -Great compliance, continue 4-10  cmH20 auto CPAP and renew annual supplies through [employer].  -[employer] liaison Yanique provide her a F30 I small full face sample of mask to try in the clinic, she report being comfortable with it.    -Sleep apnea and PAP therapy education were provided at length in the clinic today. Javier Calderón verbalized understanding.  -Emphasized diet, exercise, and weight loss in the clinic, as were non-supine sleep, avoiding  alcohol in the late evening, and driving or operating heavy machinery when sleepy.  -Javier Calderónverbalizes understanding of the above instructions and risks.     # CHRONIC SLEEP MAINTENANCE INSOMNIA:  -Instruct her to take Melatonin-EX 10 mg   -Sleep hygiene discuss in the clinic.     # ATRIAL FIBRILLATION:  -Denies headache, palpitation, and syncope in the clinic.  -Follows with PCP/ Cardiology     # XEROSTOMIA:  -Instruct Javier Zamorano purchase the Biotene gel to ease the dry mouth symptom,     # RESTLESS LEG SYNDROME:   -no more      RTC 1 month to check her Sleep disturbance       All of patient's questions were answered. She verbalizes understanding and agreement with my assessment and plan.

## 2024-11-13 ENCOUNTER — APPOINTMENT (OUTPATIENT)
Dept: PRIMARY CARE | Facility: CLINIC | Age: 74
End: 2024-11-13
Payer: MEDICARE

## 2024-11-13 ENCOUNTER — LAB (OUTPATIENT)
Dept: LAB | Facility: LAB | Age: 74
End: 2024-11-13
Payer: MEDICARE

## 2024-11-13 VITALS — DIASTOLIC BLOOD PRESSURE: 66 MMHG | BODY MASS INDEX: 19.57 KG/M2 | WEIGHT: 114 LBS | SYSTOLIC BLOOD PRESSURE: 104 MMHG

## 2024-11-13 DIAGNOSIS — D47.3 ESSENTIAL (HEMORRHAGIC) THROMBOCYTHEMIA: ICD-10-CM

## 2024-11-13 DIAGNOSIS — R82.90 ABNORMAL URINE FINDINGS: ICD-10-CM

## 2024-11-13 DIAGNOSIS — I48.91 ATRIAL FIBRILLATION, UNSPECIFIED TYPE (MULTI): ICD-10-CM

## 2024-11-13 DIAGNOSIS — R10.9 RIGHT SIDED ABDOMINAL PAIN: Primary | ICD-10-CM

## 2024-11-13 DIAGNOSIS — R10.9 RIGHT SIDED ABDOMINAL PAIN: ICD-10-CM

## 2024-11-13 LAB
ALBUMIN SERPL BCP-MCNC: 4.2 G/DL (ref 3.4–5)
ALP SERPL-CCNC: 80 U/L (ref 33–136)
ALT SERPL W P-5'-P-CCNC: 25 U/L (ref 7–45)
ANION GAP SERPL CALC-SCNC: 16 MMOL/L (ref 10–20)
APPEARANCE UR: CLEAR
AST SERPL W P-5'-P-CCNC: 26 U/L (ref 9–39)
BASOPHILS # BLD AUTO: 0.05 X10*3/UL (ref 0–0.1)
BASOPHILS NFR BLD AUTO: 0.8 %
BILIRUB SERPL-MCNC: 0.6 MG/DL (ref 0–1.2)
BILIRUB UR STRIP.AUTO-MCNC: NEGATIVE MG/DL
BUN SERPL-MCNC: 34 MG/DL (ref 6–23)
CALCIUM SERPL-MCNC: 8.7 MG/DL (ref 8.6–10.6)
CHLORIDE SERPL-SCNC: 101 MMOL/L (ref 98–107)
CO2 SERPL-SCNC: 25 MMOL/L (ref 21–32)
COLOR UR: NORMAL
CREAT SERPL-MCNC: 0.72 MG/DL (ref 0.5–1.05)
EGFRCR SERPLBLD CKD-EPI 2021: 88 ML/MIN/1.73M*2
EOSINOPHIL # BLD AUTO: 0.12 X10*3/UL (ref 0–0.4)
EOSINOPHIL NFR BLD AUTO: 1.8 %
ERYTHROCYTE [DISTWIDTH] IN BLOOD BY AUTOMATED COUNT: 13.3 % (ref 11.5–14.5)
GLUCOSE SERPL-MCNC: 88 MG/DL (ref 74–99)
GLUCOSE UR STRIP.AUTO-MCNC: NORMAL MG/DL
HCT VFR BLD AUTO: 42 % (ref 36–46)
HGB BLD-MCNC: 14.3 G/DL (ref 12–16)
IMM GRANULOCYTES # BLD AUTO: 0.02 X10*3/UL (ref 0–0.5)
IMM GRANULOCYTES NFR BLD AUTO: 0.3 % (ref 0–0.9)
KETONES UR STRIP.AUTO-MCNC: NEGATIVE MG/DL
LEUKOCYTE ESTERASE UR QL STRIP.AUTO: NEGATIVE
LYMPHOCYTES # BLD AUTO: 2.34 X10*3/UL (ref 0.8–3)
LYMPHOCYTES NFR BLD AUTO: 35.8 %
MCH RBC QN AUTO: 33.9 PG (ref 26–34)
MCHC RBC AUTO-ENTMCNC: 34 G/DL (ref 32–36)
MCV RBC AUTO: 100 FL (ref 80–100)
MONOCYTES # BLD AUTO: 0.78 X10*3/UL (ref 0.05–0.8)
MONOCYTES NFR BLD AUTO: 11.9 %
NEUTROPHILS # BLD AUTO: 3.22 X10*3/UL (ref 1.6–5.5)
NEUTROPHILS NFR BLD AUTO: 49.4 %
NITRITE UR QL STRIP.AUTO: NEGATIVE
NRBC BLD-RTO: 0 /100 WBCS (ref 0–0)
PH UR STRIP.AUTO: 5.5 [PH]
PLATELET # BLD AUTO: 211 X10*3/UL (ref 150–450)
POTASSIUM SERPL-SCNC: 4.5 MMOL/L (ref 3.5–5.3)
PROT SERPL-MCNC: 5.9 G/DL (ref 6.4–8.2)
PROT UR STRIP.AUTO-MCNC: NEGATIVE MG/DL
RBC # BLD AUTO: 4.22 X10*6/UL (ref 4–5.2)
RBC # UR STRIP.AUTO: NEGATIVE /UL
SODIUM SERPL-SCNC: 137 MMOL/L (ref 136–145)
SP GR UR STRIP.AUTO: 1.02
UROBILINOGEN UR STRIP.AUTO-MCNC: NORMAL MG/DL
WBC # BLD AUTO: 6.5 X10*3/UL (ref 4.4–11.3)

## 2024-11-13 PROCEDURE — 1036F TOBACCO NON-USER: CPT | Performed by: INTERNAL MEDICINE

## 2024-11-13 PROCEDURE — 36415 COLL VENOUS BLD VENIPUNCTURE: CPT

## 2024-11-13 PROCEDURE — 1159F MED LIST DOCD IN RCRD: CPT | Performed by: INTERNAL MEDICINE

## 2024-11-13 PROCEDURE — 1123F ACP DISCUSS/DSCN MKR DOCD: CPT | Performed by: INTERNAL MEDICINE

## 2024-11-13 PROCEDURE — 81003 URINALYSIS AUTO W/O SCOPE: CPT

## 2024-11-13 PROCEDURE — 99214 OFFICE O/P EST MOD 30 MIN: CPT | Performed by: INTERNAL MEDICINE

## 2024-11-13 PROCEDURE — 87086 URINE CULTURE/COLONY COUNT: CPT

## 2024-11-13 PROCEDURE — 80053 COMPREHEN METABOLIC PANEL: CPT

## 2024-11-13 PROCEDURE — 85025 COMPLETE CBC W/AUTO DIFF WBC: CPT

## 2024-11-13 RX ORDER — NITROGLYCERIN 0.4 MG/1
0.4 TABLET SUBLINGUAL EVERY 5 MIN PRN
Qty: 25 TABLET | Refills: 0 | Status: SHIPPED | OUTPATIENT
Start: 2024-11-13

## 2024-11-13 NOTE — PROGRESS NOTES
Subjective   Patient ID: Javier Calderón is a 74 y.o. female who presents for Follow-up (Pain in right side, med refill (prefers paper rx), UTI? ).    HPI  Patient in for a visit  Pain right side past two weeks  Hurt to turn in bed  No nausea vomiting not related to food     Review of Systems  General: Denies fever, chills, night sweats,  Eyes: Negative for recent visual changes  Ears, Nose, Throat :  Negative for hearing changes, sinus discomfort  Dermatologic: see hpi   Respiratory: Negative for wheezing, shortness of breath, cough  Cardiovascular: Negative for chest pain, palpitations, or leg swelling  Gastrointestinal: see hpi  Genitourinary see hpi   Musculoskeletal: see hpi  Neurological: Negative for headaches, dizziness    Previous history  Past Medical History:   Diagnosis Date    Abnormal finding of lung 09/09/2023    Abnormal levels of other serum enzymes 03/08/2022    Elevated liver enzymes    Abnormal renal function 09/09/2023    Abnormal weight loss 09/27/2018    Weight loss    Aortic thrombus (Multi) 09/09/2023    Dark stools 09/09/2023    Diarrhea, unspecified 12/31/2018    Diarrhea    Disorder of kidney and ureter, unspecified 11/08/2022    Abnormal renal function    Disorder of teeth and supporting structures, unspecified 02/22/2019    Dental disorder    Elevated liver enzymes 09/09/2023    Encounter for general adult medical examination without abnormal findings 03/08/2022    Encounter for Medicare annual wellness exam    Encounter for immunization 03/08/2022    Need for pneumococcal vaccination    Encounter for other screening for malignant neoplasm of breast 02/21/2020    Screening for malignant neoplasm of breast    Encounter for other screening for malignant neoplasm of breast 06/09/2022    Breast screening    Encounter for screening mammogram for malignant neoplasm of breast 02/14/2018    Visit for screening mammogram    Gastro-esophageal reflux disease without esophagitis 06/23/2020     Esophageal reflux    Hypo-osmolality and hyponatremia 09/25/2019    Hyponatremia    Injury of peritoneum, initial encounter 08/23/2019    Peritoneal hematoma    Long term (current) use of anticoagulants 09/28/2018    Chronic anticoagulation    Otalgia, right ear 12/31/2018    Otalgia, right    Otalgia, unspecified ear 12/31/2018    Otalgia    Other abnormality of red blood cells 11/08/2022    Elevated MCV    Other conditions influencing health status 11/22/2021    Oral disorder    Other fatigue 03/19/2020    Fatigue    Other fecal abnormalities 06/29/2018    Dark stools    Other specified abnormal findings of blood chemistry 11/16/2022    Low vitamin D level    Other specified abnormal findings of blood chemistry 02/21/2022    Abnormal liver function test    Other specified diseases of intestine     Small intestinal bacterial overgrowth (SIBO)    Other specified symptoms and signs involving the circulatory and respiratory systems 11/17/2017    Abnormal finding of lung    Personal history of diseases of the blood and blood-forming organs and certain disorders involving the immune mechanism 03/18/2020    History of thrombocytosis    Personal history of diseases of the blood and blood-forming organs and certain disorders involving the immune mechanism 03/18/2020    History of thrombocytosis    Personal history of other specified conditions 12/20/2022    History of nausea    Personal history of other specified conditions 12/20/2022    History of abdominal pain    Pharyngoesophageal dysphagia 09/09/2023    Pulmonary nodule 09/09/2023    Pure hypercholesterolemia, unspecified 11/08/2022    Elevated LDL cholesterol level    Pure hyperglyceridemia 11/16/2022    High triglycerides    Rash and other nonspecific skin eruption 12/30/2020    Rash    Toxic effect of venom of wasps, accidental (unintentional), initial encounter 09/18/2017    Wasp sting    Unspecified abnormal findings in urine 03/04/2022    Urine abnormality     Unspecified menopausal and perimenopausal disorder 01/23/2018    Menopausal and postmenopausal disorder     Past Surgical History:   Procedure Laterality Date    CATARACT EXTRACTION  01/08/2018    Cataract Surgery    CT ABDOMEN PELVIS ANGIOGRAM W AND/OR WO IV CONTRAST  6/12/2018    CT ABDOMEN PELVIS ANGIOGRAM W AND/OR WO IV CONTRAST 6/12/2018 [facility]    CT ABDOMEN PELVIS ANGIOGRAM W AND/OR WO IV CONTRAST  6/14/2018    CT ABDOMEN PELVIS ANGIOGRAM W AND/OR WO IV CONTRAST 6/14/2018 [facility]    IR ANGIOGRAM INFERIOR EPIGASTRIC PELVIC  6/14/2018    IR ANGIOGRAM INFERIOR EPIGASTRIC PELVIC 6/14/2018 [facility]    IR EMBOLIZATION LYMPH NODE Bilateral 6/14/2018    IR EMBOLIZATION LYMPH NODE 6/14/2018 [facility]    IR EMBOLIZATION LYMPH NODE Bilateral 6/14/2018    IR EMBOLIZATION LYMPH NODE 6/14/2018 [facility]    OTHER SURGICAL HISTORY  03/07/2018    Programming And Iterative Adjustment Of Implantable Loop Recorder    OTHER SURGICAL HISTORY  11/08/2022    Esophagogastroduodenoscopy     Social History     Tobacco Use    Smoking status: Never    Smokeless tobacco: Never   Vaping Use    Vaping status: Never Used   Substance Use Topics    Alcohol use: Never    Drug use: Never     Family History   Problem Relation Name Age of Onset    Coronary artery disease Mother      Atrial fibrillation Mother      Heart failure Mother      Hypertension Mother      Coronary artery disease Father      Other (CVA) Father      Heart failure Father      Hypertension Father      Other (Mitral valve replacement) Father      Sleep apnea Father      Sleep apnea Sister      Polymyalgia rheumatica Brother      Sleep apnea Brother      No Known Problems Son Akin     Coronary artery disease Other Multiple Family Members     Other (Diabetes mellitus) Other Family History     Lung cancer Other Family History     Brain cancer Other Family History     Pancreatic cancer Other Family History     Throat cancer Other Family History       Allergies   Allergen Reactions    Bee Venom Protein (Honey Bee) Unknown    Pollen Extracts Cough     Current Outpatient Medications   Medication Instructions    aspirin 81 mg EC tablet 1 tablet, Daily    azelastine (Astelin) 137 mcg (0.1 %) nasal spray 1 spray, Each Nostril, 2 times daily, Use in each nostril as directed    b complex 0.4 mg tablet 1 tablet, Daily    budesonide-formoteroL (Symbicort) 160-4.5 mcg/actuation inhaler 2 puffs, inhalation, 2 times daily RT, Rinse mouth with water after use to reduce aftertaste and incidence of candidiasis. Do not swallow.    budesonide-formoteroL (Symbicort) 160-4.5 mcg/actuation inhaler 2 puffs, inhalation, 2 times daily RT, Rinse mouth with water after use to reduce aftertaste and incidence of candidiasis. Do not swallow.    calcium carbonate 600 mg calcium (1,500 mg) tablet 1 tablet, Daily    cholecalciferol (Vitamin D-3) 25 MCG (1000 UT) tablet Take as directed.    denosumab (PROLIA) 60 mg, subcutaneous, Every 6 months    estradiol (Estrace) 0.01 % (0.1 mg/gram) vaginal cream Insert into the vagina. Apply a pea sized amount of vaginal area daily x2 weeks, then three times a week as needed    estradiol (Estrace) 0.01 % (0.1 mg/gram) vaginal cream Discard the applicator and apply a pea sized amount to the vaginal opening and just inside the vagina 3 times/week    fluticasone (Cutivate) 0.05 % cream Topical, As needed, Apply inch.    fluticasone (Flonase) 50 mcg/actuation nasal spray 2 sprays, Daily    fluticasone furoate-vilanteroL (Breo Ellipta) 100-25 mcg/dose inhaler 1 puff, inhalation, Daily    MELATONIN ORAL Take by mouth.    methylPREDNISolone (Medrol Dospak) 4 mg tablets Take as directed on package.    mometasone-formoterol (Dulera 100) 100-5 mcg/actuation inhaler 2 puffs, inhalation, 2 times daily RT, Rinse mouth with water after use to reduce aftertaste and incidence of candidiasis. Do not swallow.    MULTIVITAMIN ORAL 1 capsule, Daily    mupirocin  (Bactroban) 2 % ointment 3 times daily RT    nitroglycerin (NITROSTAT) 0.4 mg, Every 5 min PRN    triamcinolone (Kenalog) 0.1 % cream 1 Application       Objective       Physical Exam  Vital Signs: as recorded above  General: Well groomed, well nourished   Orientation:  Alert , oriented to time, place , and person   Mood and Affect:  Cooperative , no apparent distress normal affect  Skin: Good color, good turgor right lateral upper arm slightly raised irregular margins   Eyes: Extra ocular muscle movements intact, anicteric sclerae  Neck: Supple, full range of movement  Chest: Normal breath sounds, normal chest wall exam, symmetric, good air entry, clear to auscultation  Heart: Regular rate and rhythm, without murmur, gallop, or rubs  Abdomen soft nontender no masses felt no hepatosplenomegaly, no rebound or guarding  BACK:  no CTLS spine tenderness, no flank tenderness  Extremities: full range of movement  bilateral UE and bilateral LE,  no lower extremity edema  Neurological: Alert, oriented, cranial nerves II-XII grossly intact except for visual acuity  Sensation:  Intact   Gait: normal steady      Assessment/Plan   Javier Calderón is a 74 y.o. female who presents for the concerns below:    Problem List Items Addressed This Visit    None  ABDOMINAL PAIN/REFLUX PLAN:  bland diet no spicy, oily, dairy, acidic i.e. orange juice, coffee, until pain is better  hold off on use of nsaids  if pain is worse will consider imaging studies  swithc to  / or start with PPI  hold off on certain medications i.e. disphosphonates like boniva, actonel or space out when meds are taken.  if not better will consult Gastroenterology if severe or associated with fever, vomiting, bloody stool proceed to the emergency dept  Ruq ultrasound     Bruising will recheck cbc along with the ab pain  SKIN CHANGE right upper arm will go to [facility] apex after our visit , appeared suddenly     URINARY SYMPTOMS rule out uti PLAN: Hydration, bladder  hygiene discussed with patient, urinalysis, urine culture if with positive findings. Antibiotic treatment if appropriate,       Discussed with:   Return in : 6 weeks sooner if needed     Portions of this note were generated using digital voice recognition software, and may contain grammatical errors       Andrew Ayoub MD  11/13/24  11:06 AM

## 2024-11-14 ENCOUNTER — PATIENT MESSAGE (OUTPATIENT)
Dept: PRIMARY CARE | Facility: CLINIC | Age: 74
End: 2024-11-14
Payer: MEDICARE

## 2024-11-14 DIAGNOSIS — N28.9 RENAL INSUFFICIENCY: Primary | ICD-10-CM

## 2024-11-14 DIAGNOSIS — R79.89 ELEVATED SERUM CREATININE: ICD-10-CM

## 2024-11-14 LAB — BACTERIA UR CULT: NORMAL

## 2024-11-15 ENCOUNTER — LAB (OUTPATIENT)
Dept: LAB | Facility: LAB | Age: 74
End: 2024-11-15
Payer: MEDICARE

## 2024-11-15 ENCOUNTER — HOSPITAL ENCOUNTER (OUTPATIENT)
Dept: RADIOLOGY | Facility: HOSPITAL | Age: 74
Discharge: HOME | End: 2024-11-15
Payer: MEDICARE

## 2024-11-15 ENCOUNTER — APPOINTMENT (OUTPATIENT)
Dept: RADIOLOGY | Facility: HOSPITAL | Age: 74
End: 2024-11-15
Payer: MEDICARE

## 2024-11-15 DIAGNOSIS — R79.89 LOW SERUM TOTAL PROTEIN LEVEL: Primary | ICD-10-CM

## 2024-11-15 DIAGNOSIS — R79.89 LOW SERUM TOTAL PROTEIN LEVEL: ICD-10-CM

## 2024-11-15 DIAGNOSIS — R10.9 RIGHT SIDED ABDOMINAL PAIN: ICD-10-CM

## 2024-11-15 DIAGNOSIS — N28.9 ABNORMAL RENAL FUNCTION: Primary | ICD-10-CM

## 2024-11-15 LAB
CREAT UR-MCNC: 68.7 MG/DL (ref 20–320)
PROT UR-ACNC: 6 MG/DL (ref 5–24)
PROT/CREAT UR: 0.09 MG/MG CREAT (ref 0–0.17)

## 2024-11-15 PROCEDURE — 84156 ASSAY OF PROTEIN URINE: CPT

## 2024-11-15 PROCEDURE — 82570 ASSAY OF URINE CREATININE: CPT

## 2024-11-15 PROCEDURE — 76705 ECHO EXAM OF ABDOMEN: CPT | Performed by: RADIOLOGY

## 2024-11-15 PROCEDURE — 76705 ECHO EXAM OF ABDOMEN: CPT

## 2024-11-18 ENCOUNTER — HOSPITAL ENCOUNTER (OUTPATIENT)
Dept: RADIOLOGY | Facility: HOSPITAL | Age: 74
Discharge: HOME | End: 2024-11-18
Payer: MEDICARE

## 2024-11-18 DIAGNOSIS — N28.9 ABNORMAL RENAL FUNCTION: ICD-10-CM

## 2024-11-18 DIAGNOSIS — R79.89 LOW SERUM TOTAL PROTEIN LEVEL: ICD-10-CM

## 2024-11-18 PROCEDURE — 76770 US EXAM ABDO BACK WALL COMP: CPT

## 2024-11-18 PROCEDURE — 76770 US EXAM ABDO BACK WALL COMP: CPT | Performed by: RADIOLOGY

## 2024-11-19 DIAGNOSIS — N28.9 RENAL INSUFFICIENCY: ICD-10-CM

## 2024-11-19 DIAGNOSIS — R10.9 ABDOMINAL DISCOMFORT: Primary | ICD-10-CM

## 2024-11-21 ENCOUNTER — OFFICE VISIT (OUTPATIENT)
Dept: SLEEP MEDICINE | Facility: CLINIC | Age: 74
End: 2024-11-21
Payer: MEDICARE

## 2024-11-21 VITALS
RESPIRATION RATE: 16 BRPM | DIASTOLIC BLOOD PRESSURE: 68 MMHG | HEIGHT: 64 IN | WEIGHT: 114 LBS | BODY MASS INDEX: 19.46 KG/M2 | SYSTOLIC BLOOD PRESSURE: 110 MMHG | TEMPERATURE: 96.2 F | HEART RATE: 73 BPM | OXYGEN SATURATION: 95 %

## 2024-11-21 DIAGNOSIS — G47.33 OSA (OBSTRUCTIVE SLEEP APNEA): Primary | ICD-10-CM

## 2024-11-21 DIAGNOSIS — I48.92 ATRIAL FLUTTER, UNSPECIFIED TYPE (MULTI): ICD-10-CM

## 2024-11-21 DIAGNOSIS — I48.91 ATRIAL FIBRILLATION, UNSPECIFIED TYPE (MULTI): ICD-10-CM

## 2024-11-21 DIAGNOSIS — G47.33 OBSTRUCTIVE SLEEP APNEA (ADULT) (PEDIATRIC): ICD-10-CM

## 2024-11-21 PROCEDURE — 1123F ACP DISCUSS/DSCN MKR DOCD: CPT

## 2024-11-21 PROCEDURE — 3008F BODY MASS INDEX DOCD: CPT

## 2024-11-21 PROCEDURE — 1159F MED LIST DOCD IN RCRD: CPT

## 2024-11-21 PROCEDURE — 1036F TOBACCO NON-USER: CPT

## 2024-11-21 PROCEDURE — 99213 OFFICE O/P EST LOW 20 MIN: CPT

## 2024-11-21 PROCEDURE — 1160F RVW MEDS BY RX/DR IN RCRD: CPT

## 2024-11-21 ASSESSMENT — SLEEP AND FATIGUE QUESTIONNAIRES
SITING INACTIVE IN A PUBLIC PLACE LIKE A CLASS ROOM OR A MOVIE THEATER: MODERATE CHANCE OF DOZING
HOW LIKELY ARE YOU TO NOD OFF OR FALL ASLEEP WHILE WATCHING TV: MODERATE CHANCE OF DOZING
HOW LIKELY ARE YOU TO NOD OFF OR FALL ASLEEP IN A CAR, WHILE STOPPED FOR A FEW MINUTES IN TRAFFIC: WOULD NEVER DOZE
HOW LIKELY ARE YOU TO NOD OFF OR FALL ASLEEP WHEN YOU ARE A PASSENGER IN A CAR FOR AN HOUR WITHOUT A BREAK: SLIGHT CHANCE OF DOZING
HOW LIKELY ARE YOU TO NOD OFF OR FALL ASLEEP WHILE SITTING AND TALKING TO SOMEONE: WOULD NEVER DOZE
HOW LIKELY ARE YOU TO NOD OFF OR FALL ASLEEP WHILE SITTING AND READING: MODERATE CHANCE OF DOZING
HOW LIKELY ARE YOU TO NOD OFF OR FALL ASLEEP WHILE SITTING QUIETLY AFTER LUNCH WITHOUT ALCOHOL: WOULD NEVER DOZE
ESS-CHAD TOTAL SCORE: 9
HOW LIKELY ARE YOU TO NOD OFF OR FALL ASLEEP WHILE LYING DOWN TO REST IN THE AFTERNOON WHEN CIRCUMSTANCES PERMIT: MODERATE CHANCE OF DOZING

## 2024-11-21 NOTE — PATIENT INSTRUCTIONS
[facility] Sleep Medicine  POR [address]  [facility]  [address]  [facility] [address]       Thank you for coming to the Sleep Medicine Clinic today! Your sleep medicine provider today was: DUNIA Masters Below is a summary of your treatment plan, patient education, other important information, and our contact numbers.    Dear  Ms Javier Calderón       Your Sleep Provider Today: DUNIA Masters  Your Primary Care Physician: Andrew Ayoub MD   Your Referring Provider: Vanna Livingston APRN-CNP    Diagnosis: OBSTRUCTIVE SLEEP APNEA       Thank you for visiting  Sleep Medicine Clinic !     1.You are doing great, we ordered the annual supplies for you. Feel free to contact your DME company to get new supplies.    2. Please do not drive when you are sleepy and  start practicing the sleep hygiene as discussed in clinic.    3. FOR QUESTIONS AND CONCERNS:   a) : In case of problems with machine or mask interface, please contact your DME company first. [employer] is the company who provides you the machine and/or PAP supplies / accessories. If [employer] is your DME, you can reach them at [phone].   b): Please call my office with issues or questions: [phone] ([place]); [phone] ([facility]); [phone] ([place])    If you have a CPAP or BiPAP machine at home, please bring the unit and all accessories including the power cord to your appointments unless I tell you otherwise. Please have knowledge of the DME company you worked with to receive your PAP device. If you have copies of any previous sleep testing completed outside of , please bring with you to clinic as well. This information will make our visits more productive.     If you are new to CPAP or BiPAP, please note the minimum usage insurance requires to continuing coverage for the equipment as noted by your DME company. Please discuss  equipment issues (PAP unit, mask fit, humidification, etc.) with your [employer] company first.       In the event that you are running more than 10 minutes late to your appointment, I will kindly ask you to reschedule. Thanks.      TREATMENT PLAN     Follow-up Appointment:   1 month    PATIENT EDUCATION     OBSTRUCTIVE SLEEP APNEA (SHANE) is a sleep disorder where your upper airway muscles relax during sleep and the airway intermittently and repetitively narrows and collapses leading to partially blocked airway (hypopnea) or completely blocked airway (apnea) which, in turn, can disrupt breathing in sleep, lower oxygen levels while you sleep and cause night time wakings. Because both apnea and hypopnea may cause higher carbon dioxide or low oxygen levels, untreated SHANE can lead to heart arrhythmia, elevation of blood pressure, and make it harder for the body to consolidate memory and facilitate metabolism (leading to higher blood sugars at night). Frequent partial arousals occur during sleep resulting in sleep deprivation and daytime sleepiness. SHANE is associated with an increased risk of cardiovascular disease, stroke, hypertension, and insulin resistance. Moreover, untreated SHANE with excessive daytime sleepiness can increase the risk of motor vehicular accidents.    Below are conservative strategies for SHANE regardless of SHANE severity are:   Positional therapy - Avoid sleeping on your back.   Healthy diet and regular exercise to optimize weight is highly encouraged.   Avoid alcohol late in the evening and sedative-hypnotics as these substances can make sleep apnea worse.   Improve breathing through the nose with intranasal steroid spray, saline rinse, or antihistamines    Safety: Avoid driving vehicle and operating heavy equipment while sleepy. Drowsy driving may lead to life-threatening motor vehicle accidents. A person driving while sleepy is 5 times more likely to have an accident. If you feel sleepy, pull over and take  a short power nap (sleep for less than 30 minutes). Otherwise, ask somebody to drive you.    Treatment options for sleep apnea include weight management, positional therapy, Positive Airway Therapy (PAP) therapy, oral appliance therapy, hypoglossal nerve stimulator (Inspire) and select airway surgeries.    Starting Positive Airway Pressure (PAP): You were ordered a device to wear when you sleep called PAP (Positive Airway Pressure) to treat your sleep apnea. The order will be submitted to a durable medical equipment (DME) company who will arrange setting you up with the device. They will provide all the necessary equipment and discuss use and maintenance of the device with you as well as mask fitting and process of replacing / renewing PAP supplies or accessories. Once you get the machine, please start using it immediately. You may not be successful right away and that is okay. [place] be certain that you keep trying nightly and reach out to DME if you are struggling or having issues with machine usage.     *Please follow-up with me in 1-2 months of starting CPAP to see how well it is working for you and to do some troubleshooting if needed. Also, please bring all PAP equipment with you to follow up appointments unless told otherwise.     Important things to keep in mind as you start PAP:  Insurance will monitor your usage during the first 90 days. You should use your PAP - all night, every night, and including all naps (especially if naps are more than 30 minutes) for your health. The bare minimum is to use your PAP device while sleeping for at least 4 hours per day at least 5-6 days per week.. Otherwise, your PAP device will be reclaimed by your DME company at 90 days.  There are many masks to choose from to wear with your PAP machine. If you are not comfortable with the first mask issued to you, call your DME company and ask for another option to try. You typically have a 30-day mask guarantee from the day you  received your machine.   Discuss with your provider if you are having issues breathing with the machine or if the temperature or humidity feel uncomfortable.  Expect to have an adjustment period when you start your device. It helps to continuing wearing the machine every day for a period of time until you get more used to it. You can practice with wearing the mask alone if you need, then add in the PAP air pressure a few days later.   Reach out for help if you are struggling! The sleep medicine department can be reached at [phone]  We encourage you to download data monitoring apps to your phone. For [employer] 10/11 - MyAir shey. For [employer] - DreamMapper. Both apps are available in the Shey store for free and are a great tool to monitor your progress with your PAP device night to night.    Tips for success with PAP machine usage:  Comfortable and well-fitting mask  Appropriate pressure on the machine  Using humidification  Support from bed partner and clinical team      Maintaining your CPAP/BPAP device:    The humidification chamber (aka water tank or water chamber) needs to be filled with distilled water to prevent buildup of white deposits in the future. If you cannot find distilled water, you can use tap water but expect to have white deposits buildup seen after prolonged use with tap water. If you start seeing white deposits on the water chamber, you can clean it by filling it with equal parts of distilled white vinegar and water. Let the vinegar-water mixture sit for 2 hours, and then rinse it with running tap water. Clean with soap and water then let it dry.     You should try to keep your machine clean in order to work well. Here are some tips to clean PAP supplies / accessories:    Clean the humidification chamber (aka water tank) as well as your mask and tubing at least once a week with soap and water.   Alternatively, you can fill a sink or basin with warm water and add a  little mild detergent, like Ivory dish soap. Gently wipe your supplies with the soapy water to free all the oils and dirt that may have collected. Once that's done, rinse these items with clean water until the soap is gone and let them air dry. You can hang your tubing over the curtain debby in your bathroom so that it dries.  The mask insert (part of the mask that has contact with your skin) needs to be cleaned with soap and water daily. Another option is to wipe them down with CPAP wipes or baby wipes.    You should replace your mask and tubing frequently in order to prevent bacteria buildup, machine damage, and mask seal issues. The older the mask and hose, the high likelihood that there is bacteria buildup in it especially if they are not cleaned regularly. Dirty filters damage machines because build-up of dust and contaminants can cause machine to over-heat, and in time, damage the motor of machine. Cushions lose their seal over time as most masks are made of plastic and silicone while headgear is made of neoprene. These materials will break down with age and frequent use. Here is the recommended replacement schedule for PAP supplies / accessories:    Twice a month- disposable filters and cushions for nasal mask or nasal pillows.  Once a month- cushion for full face mask  Every 3 months- mask with headgear and PAP tubing (standard or heated hose)  Every 6 months- reusable filter, water chamber, and chin strap     Other useful information:    Some people do not put water in the tank while other people prefers to put water in the tank to prevent mouth dryness. Try to experiment to determine which is more comfortable for you.   In general, new machines have 2 years warranty on parts while health insurance allows you to have a new machine once every 5 years.     Common issues with PAP machine:    Mask gets dislodged when turning to the side: Consider getting a CPAP pillow or switching to a mask with hose on top.  "    Dry mouth:  Your machine has built-in humidifier that heats up the air to prevent dry mouth. It can be adjusted to your comfort. You can try that first and increase setting one level one night at a time to check which setting is comfortable and effective in lessening dry mouth. In some patients with heated hose, adjusting tube temperature to make air warmer can improve dry mouth. If dry mouth persists despite adjusting humidity or tube temperature setting, may apply OTC Biotene gel over the gums at bedtime.  If Biotene gel is not effective, consider trying XEROSTOM gel from [employer].  Also, eliminate or reduce dose of medications that can cause dry mouth if possible. Lastly, may try getting a separate room humidifier machine.    Airleaks: Please call DME as they may need to adjust your mask or refit you with a different kind or different size of mask. In addition, you can ask DME for tips on getting a good mask seal and mask fit.     Difficulty tolerating the mask: Contact your DME to try a different kind of mask and/or call office to get a referral to Sleep Psychologist for CPAP desensitization. CPAP desensitization technique is a set of strategies that helps patient cope with claustrophobia and anxiety related to wearing mask. Alternatively, we can do a daytime mini-sleep study called PAP-nap trial wherein you will try on different kinds of mask and the sleep technician will try different pressure settings on CPAP and BPAP machines to see which specific pressure is tolerable and comfortable for you.     Water droplets or moisture within the hose and/or mask: This is called rain-out and it is caused by condensation of too much heated humidity on the cooler walls of the hose. If you have rain-out, turn down humidity settings or get a heated hose. If you already have a heated hose, turn up the \"tube temperature\" of the heated hose. Alternatively, if you don't want to get a heated hose or warmer air, may wrap " the CPAP hose with stockings to keep it somewhat warm. Also, you need to place the machine on the floor and lower the hose so that water won't travel upward towards your mask.     You can also go to the following EDUCATION WEBSITES for further information:   American Academy of Sleep Medicine http://sleepeducation.org  National Sleep Foundation: https://sleepfoundation.org  American Sleep Apnea Association: https://www.sleepapnea.org (for patients with sleep apnea)  Narcolepsy Network: https://www.narcolepsynetwork.org (for patients with narcolepsy)  [employer]ole[employer]: https://www.Tristarcolepsy.org (for patients with narcolepsy)  Hypersomnia Foundation: https://www.hypersomniafoundation.org (for patients with idiopathic hypersomnia)  RLS foundation: https://www.rls.org (for patients with restless leg syndrome)    IMPORTANT INFORMATION     Call 911 for medical emergencies.  Our offices are generally open from Monday-Friday, 8 am - 5 pm.   There are no supporting services by either the sleep doctors or their staff on weekends and Holidays, or after 5 PM on weekdays.   If you need to get in touch with me, you may either call my office number or you can use [employer].  If a referral for a test, for CPAP, or for another specialist was made, and you have not heard about scheduling this within a week, please call scheduling at [phone] ([phone]).  If you are unable to make your appointment for clinic or an overnight study, kindly call the office or sleep testing center at least 48 hours in advance to cancel and reschedule.  If you are on CPAP, please bring your device's card and/or the device to each clinic appointment.   In case of problems with PAP machine or mask interface, please contact your [employer] (Durable Medical Equipment) company first. [employer] is the company who provides you the machine and/or PAP supplies.       PRESCRIPTIONS     We require 7 days advanced notice for prescription refills. If we do not receive the  request in this time, we cannot guarantee that your medication will be refilled in time.    IMPORTANT PHONE NUMBERS     Sleep Medicine Clinic Fax: [phone]  Appointments (for Pediatric Sleep Clinic): [phone] - option 1  Appointments (for Adult Sleep Clinic): [phone] - option 2  Appointments (For Sleep Studies): [phone] - option 3  Behavioral Sleep Medicine: [phone]  Sleep Surgery: [phone]  Nutrition Service: [phone]  Weight management clinics with endocrinology: [phone]  Bariatric Services: [phone] (includes weight loss medications and weight loss surgery)  [facility]: [phone] (offers holistic approaches to weight management)  ENT (Otolaryngology): [phone]  Headache Clinic (Neurology): [phone]  Neurology: [phone]  Psychiatry: [phone]  Pulmonary Function Testing (PFT) Center: [phone]  Pulmonary Medicine: [phone]  [place] (DME): [phone]      OUR SLEEP TESTING LOCATIONS     Our team will contact you to schedule your sleep study, however, you can contact us as follow:  Main Phone Line (scheduling only): [phone]), option 3  Adult and Pediatric Locations  [facility] (6 years and older): [facility] - 4th floor ([address] [place] [address]) After hours line: [phone]  [facility] (Main campus: All ages): [facility], 6th floor. After hours line: [phone]   [place] (5 years and older; younger considered on case-by-case basis): [address]; [address], [address]. Scheduling  After hours line: [phone]   [place] (6 years and older): [address]; [address]; [address]   [place] (6 years and older): [address], [address]  After hours line: [phone]   [place] (13 years and older) in [place]: [address], 2nd floor  After hours line: [phone]  [facility] (13 year and older): [address], [address]  After hours line: [phone]     Adult Only Locations:   [place] (18 years and older): [address] [facility], 2nd floor   [place] (18 years and older): [address]; 4th floor  After hours line: [phone]   [place] (18 years and older) at [place]: [address] [facility]  After hours line: [phone]     CONTACTING YOUR SLEEP MEDICINE PROVIDER AND SLEEP TEAM      For issues with your machine or mask interface, please call your DME provider first. DME stands for durable medical company. DME is the company who provides you the machine and/or PAP supplies / accessories.   To schedule, cancel, or reschedule SLEEP STUDY APPOINTMENTS, please call the Main Phone Line at [phone] - option 3.   To schedule, cancel, or reschedule CLINIC APPOINTMENTS, you can do it in \"MyChart\", call [phone] to speak with my  (Naty Davison), or call the Main Phone Line at [phone] - option 2  For CLINICAL QUESTIONS or MEDICATION REFILLS, please call direct line for Adult Sleep Nurses at [phone].   Lastly, you can also send a message directly to your provider through \"My Chart\", which is the email service through your  Records Account: [email or web address]       Here at [facility], we wish you a restful sleep!   "

## 2024-11-22 ENCOUNTER — HOSPITAL ENCOUNTER (OUTPATIENT)
Dept: RADIOLOGY | Facility: CLINIC | Age: 74
Discharge: HOME | End: 2024-11-22
Payer: MEDICARE

## 2024-11-22 DIAGNOSIS — R10.9 ABDOMINAL DISCOMFORT: ICD-10-CM

## 2024-11-22 PROCEDURE — 76830 TRANSVAGINAL US NON-OB: CPT

## 2024-11-25 ENCOUNTER — APPOINTMENT (OUTPATIENT)
Dept: GASTROENTEROLOGY | Facility: CLINIC | Age: 74
End: 2024-11-25
Payer: MEDICARE

## 2024-11-25 ENCOUNTER — LAB (OUTPATIENT)
Dept: LAB | Facility: LAB | Age: 74
End: 2024-11-25
Payer: MEDICARE

## 2024-11-25 DIAGNOSIS — K59.09 OTHER CONSTIPATION: ICD-10-CM

## 2024-11-25 DIAGNOSIS — K63.8219 SMALL INTESTINAL BACTERIAL OVERGROWTH (SIBO): Primary | ICD-10-CM

## 2024-11-25 PROCEDURE — 1160F RVW MEDS BY RX/DR IN RCRD: CPT | Performed by: INTERNAL MEDICINE

## 2024-11-25 PROCEDURE — 1159F MED LIST DOCD IN RCRD: CPT | Performed by: INTERNAL MEDICINE

## 2024-11-25 PROCEDURE — 1123F ACP DISCUSS/DSCN MKR DOCD: CPT | Performed by: INTERNAL MEDICINE

## 2024-11-25 PROCEDURE — 36415 COLL VENOUS BLD VENIPUNCTURE: CPT

## 2024-11-25 PROCEDURE — 99214 OFFICE O/P EST MOD 30 MIN: CPT | Performed by: INTERNAL MEDICINE

## 2024-11-25 PROCEDURE — 84443 ASSAY THYROID STIM HORMONE: CPT

## 2024-11-25 RX ORDER — DOXYCYCLINE 100 MG/1
100 CAPSULE ORAL 2 TIMES DAILY
Qty: 20 CAPSULE | Refills: 1 | Status: SHIPPED | OUTPATIENT
Start: 2024-11-25 | End: 2024-12-15

## 2024-11-25 NOTE — PROGRESS NOTES
"Subjective     History of Present Illness:   Javier Calderón is a 74 y.o. female who presents to GI clinic for off and on abdominal pain, sometimes \"discomfort\", and bloating.  Symptoms fluctuate from discomfort to more painful without any clear precipitants or triggers.  Has been going on for a few months.  Weight up a couple pounds.  (She is extremely vigilant about this -- was not overweight early adolescent and has been very rigorously diet managed since then.)  Also feeling more constipated. Always was more inclined to loose stool.  History of SIBO with symptoms usually more loose stool and sometimes weight loss.  Has always responded in the past to doxycycline, sometimes requiring a second course.  She has not done this in several years.  On CPAP.    Using artificial saliva with xylitol.    Review of Systems  Review of Systems    Social History   reports that she has never smoked. She has never used smokeless tobacco. She reports that she does not drink alcohol and does not use drugs.     Allergies  Allergies   Allergen Reactions    Bee Venom Protein (Honey Bee) Unknown    Pollen Extracts Cough       Medications  Current Outpatient Medications   Medication Instructions    aspirin 81 mg EC tablet 1 tablet, Daily    azelastine (Astelin) 137 mcg (0.1 %) nasal spray 1 spray, Each Nostril, 2 times daily, Use in each nostril as directed    b complex 0.4 mg tablet 1 tablet, Daily    budesonide-formoteroL (Symbicort) 160-4.5 mcg/actuation inhaler 2 puffs, inhalation, 2 times daily RT, Rinse mouth with water after use to reduce aftertaste and incidence of candidiasis. Do not swallow.    calcium carbonate 600 mg calcium (1,500 mg) tablet 1 tablet, Daily    cholecalciferol (Vitamin D-3) 25 MCG (1000 UT) tablet Take as directed.    denosumab (PROLIA) 60 mg, subcutaneous, Every 6 months    estradiol (Estrace) 0.01 % (0.1 mg/gram) vaginal cream Insert into the vagina. Apply a pea sized amount of vaginal area daily x2 " weeks, then three times a week as needed    estradiol (Estrace) 0.01 % (0.1 mg/gram) vaginal cream Discard the applicator and apply a pea sized amount to the vaginal opening and just inside the vagina 3 times/week    fluticasone (Cutivate) 0.05 % cream Topical, As needed, Apply inch.    fluticasone (Flonase) 50 mcg/actuation nasal spray 2 sprays, Daily    MELATONIN ORAL Take by mouth.    MULTIVITAMIN ORAL 1 capsule, Daily    mupirocin (Bactroban) 2 % ointment 3 times daily RT    nitroglycerin (NITROSTAT) 0.4 mg, sublingual, Every 5 min PRN, For up to 3 doses as needed. Call 911 if pain persists.    triamcinolone (Kenalog) 0.1 % cream 1 Application        Objective   There were no vitals taken for this visit.   Physical Exam  Constitutional:       Appearance: Normal appearance.   HENT:      Mouth/Throat:      Mouth: Mucous membranes are moist.      Pharynx: Oropharynx is clear.   Eyes:      Extraocular Movements: Extraocular movements intact.      Pupils: Pupils are equal, round, and reactive to light.   Cardiovascular:      Rate and Rhythm: Normal rate and regular rhythm.      Heart sounds: No murmur heard.  Pulmonary:      Effort: Pulmonary effort is normal.      Breath sounds: Normal breath sounds.   Abdominal:      General: Abdomen is flat. Bowel sounds are normal.      Palpations: Abdomen is soft. There is no mass.   Skin:     General: Skin is warm and dry.   Neurological:      Mental Status: She is alert.   Psychiatric:         Mood and Affect: Mood normal.         Behavior: Behavior normal.         Thought Content: Thought content normal.         Judgment: Judgment normal.                       Assessment/Plan   Javier Calderón is a 74 y.o. female who presents to GI clinic for variable abdominal discomfort, sometimes pain (location quite variable around the abdomen.)  Question SIBO although an unusual presentation for her but she feels like there is some similarities.  Question related to diet fluctuations  (because her total protein was low she tried markedly increasing the protein content of her diet for a while but the symptoms may have predated that and they have persisted while she has been back to her regular diet for the last 2 weeks).  While total protein is little bit low her albumin has been consistently normal so this is unlikely to represent a nutritional issue or other primary GI issue like protein-losing enteropathy (particularly with her having tended more to constipation lately).    Plan    Doxycycline 100 mg twice daily for 10 days and may repeat if symptoms recur x 1    Continue the MiraLAX that she started a couple days ago    Let me know if symptoms fail to resolve      [place] MD

## 2024-11-26 ENCOUNTER — HOSPITAL ENCOUNTER (OUTPATIENT)
Dept: CARDIOLOGY | Facility: CLINIC | Age: 74
Discharge: HOME | End: 2024-11-26
Payer: MEDICARE

## 2024-11-26 DIAGNOSIS — I48.19 ATRIAL FIBRILLATION, PERSISTENT (MULTI): ICD-10-CM

## 2024-11-26 LAB — TSH SERPL-ACNC: 2.32 MIU/L (ref 0.44–3.98)

## 2024-11-26 PROCEDURE — 93298 REM INTERROG DEV EVAL SCRMS: CPT

## 2024-12-03 ENCOUNTER — APPOINTMENT (OUTPATIENT)
Dept: OBSTETRICS AND GYNECOLOGY | Facility: CLINIC | Age: 74
End: 2024-12-03
Payer: MEDICARE

## 2024-12-03 VITALS
DIASTOLIC BLOOD PRESSURE: 62 MMHG | SYSTOLIC BLOOD PRESSURE: 108 MMHG | BODY MASS INDEX: 19.29 KG/M2 | HEIGHT: 64 IN | WEIGHT: 113 LBS

## 2024-12-03 DIAGNOSIS — R93.89 ENDOMETRIAL THICKENING ON ULTRASOUND: Primary | ICD-10-CM

## 2024-12-03 PROCEDURE — 1123F ACP DISCUSS/DSCN MKR DOCD: CPT | Performed by: OBSTETRICS & GYNECOLOGY

## 2024-12-03 PROCEDURE — 3008F BODY MASS INDEX DOCD: CPT | Performed by: OBSTETRICS & GYNECOLOGY

## 2024-12-03 PROCEDURE — 1036F TOBACCO NON-USER: CPT | Performed by: OBSTETRICS & GYNECOLOGY

## 2024-12-03 PROCEDURE — 99203 OFFICE O/P NEW LOW 30 MIN: CPT | Performed by: OBSTETRICS & GYNECOLOGY

## 2024-12-03 PROCEDURE — 1159F MED LIST DOCD IN RCRD: CPT | Performed by: OBSTETRICS & GYNECOLOGY

## 2024-12-03 PROCEDURE — 1160F RVW MEDS BY RX/DR IN RCRD: CPT | Performed by: OBSTETRICS & GYNECOLOGY

## 2024-12-03 NOTE — PROGRESS NOTES
ASSESSMENT/PLAN  Endometrial thickening on ultrasound (Primary)  Reassurance. No bleeding and EMS less than 11mm. No workup indicated.  Pain was due to CIBO and is now resolved.    Okay for pt to call when needs refill of estradiol vaginal cream.       SUBJECTIVE    HPI    75 yo referred by PCP after having a pelvic ultrasound that   Pt originally had imaging because she was having abdominal/pelvic pain. Pt diagnosed with CIBO and was using an oral moisturizer when using a CPAP. This triggered a flare of CIBO. On day 5 of doxycycline and pain is gone. While having pain, PCP ordered a pelvic ultrasound. It showed   1.  Nonspecific small volume hypoechoic fluid within endometrial  canal. Endometrial complex not significantly thickened measuring 5 mm  in double layer thickness.  2. Unremarkable appearance of the right ovary with demonstrated blood  flow.  3. Nonvisualization of the left ovary.    Denies any bleeding.   Uses pea sized amount of intravaginal estrogen cream.  Referred by PCP for GYN evaluation.     ROS    Constitutional: no fever, no chills, no recent weight gain, no recent weight loss and no fatigue.   ENT: no hearing loss, no nosebleeds and no sinus congestion.   Cardiovascular: no chest pain  Respiratory: no shortness of breath, no cough and no wheezing.   Gastrointestinal: no abdominal pain, no constipation, no nausea, no diarrhea and no vomiting.   Genitourinary: no pelvic pain, no dysuria, no urinary incontinence, no vaginal dryness, no vaginal itching, no dyspareunia, no dysmenorrhea, no sexual problems, no change in urinary frequency, no vaginal discharge, no unexplained vaginal bleeding and no lesion/sore.   Musculoskeletal: no back pain, no joint swelling  Integumentary: no rashes, no skin lesions, no breast pain, no nipple discharge and no breast lump.   Neurological: no headache  Psychiatric: + sleep disturbances, no anxiety and no depression.   Endocrine: no hot flashes, + loss of hair  "  Hematologic/Lymphatic: no swollen glands, no tendency for easy bleeding and no tendency for easy bruising.          OBJECTIVE    /62   Ht 1.626 m (5' 4\")   Wt 51.3 kg (113 lb)   BMI 19.40 kg/m²     Physical Exam     Constitutional: Alert and in no acute distress. Well developed, well nourished   Abdomen: soft nontender; no abdominal mass palpated, no organomegaly and no hernias   Genitourinary: external genitalia: normal, no inguinal lymphadenopathy,  urethra: normal,  perianal area: normal   Vagina: normal.   Cervix: Normal appearing without lesions.  Psychiatric: alert and oriented x 3., affect normal to patient baseline and mood: appropriate       Samantha Grimaldo MD  "

## 2024-12-07 ENCOUNTER — PATIENT MESSAGE (OUTPATIENT)
Dept: SLEEP MEDICINE | Facility: CLINIC | Age: 74
End: 2024-12-07
Payer: MEDICARE

## 2024-12-16 ENCOUNTER — LAB (OUTPATIENT)
Dept: LAB | Facility: LAB | Age: 74
End: 2024-12-16
Payer: MEDICARE

## 2024-12-16 DIAGNOSIS — R10.9 ABDOMINAL DISCOMFORT: ICD-10-CM

## 2024-12-16 DIAGNOSIS — N28.9 RENAL INSUFFICIENCY: ICD-10-CM

## 2024-12-16 PROBLEM — Z71.3 DIETARY COUNSELING AND SURVEILLANCE: Status: ACTIVE | Noted: 2024-12-16

## 2024-12-16 LAB
ALBUMIN SERPL BCP-MCNC: 4.3 G/DL (ref 3.4–5)
ALP SERPL-CCNC: 62 U/L (ref 33–136)
ALT SERPL W P-5'-P-CCNC: 18 U/L (ref 7–45)
ANION GAP SERPL CALC-SCNC: 11 MMOL/L (ref 10–20)
AST SERPL W P-5'-P-CCNC: 20 U/L (ref 9–39)
BILIRUB SERPL-MCNC: 0.5 MG/DL (ref 0–1.2)
BUN SERPL-MCNC: 21 MG/DL (ref 6–23)
CALCIUM SERPL-MCNC: 9.1 MG/DL (ref 8.6–10.6)
CHLORIDE SERPL-SCNC: 98 MMOL/L (ref 98–107)
CO2 SERPL-SCNC: 31 MMOL/L (ref 21–32)
CREAT SERPL-MCNC: 0.78 MG/DL (ref 0.5–1.05)
EGFRCR SERPLBLD CKD-EPI 2021: 80 ML/MIN/1.73M*2
GLUCOSE SERPL-MCNC: 95 MG/DL (ref 74–99)
POTASSIUM SERPL-SCNC: 4.8 MMOL/L (ref 3.5–5.3)
PROT SERPL-MCNC: 6 G/DL (ref 6.4–8.2)
SODIUM SERPL-SCNC: 135 MMOL/L (ref 136–145)

## 2024-12-16 PROCEDURE — 80053 COMPREHEN METABOLIC PANEL: CPT

## 2024-12-16 PROCEDURE — 36415 COLL VENOUS BLD VENIPUNCTURE: CPT

## 2024-12-16 NOTE — PROGRESS NOTES
Reason for Nutrition Visit:  Pt is a 74 y.o. female referred for   1. Dietary counseling and surveillance           Pt was referred by Dr. Ayoub  on 12/11/24.      Past Medical Hx:  Patient Active Problem List   Diagnosis    Allergic bronchitis ([facility])    Allergic reaction to bee sting    Allergic urticaria    Arthritis    A-fib (Multi)    Atrial flutter (Multi)    Paroxysmal atrial fibrillation (Multi)    Elevated LDL cholesterol level    Elevated MCV    Esophageal reflux    Essential (hemorrhagic) thrombocythemia    Fatigue    Heart palpitations    High triglycerides    Hyponatremia    Abnormal liver function test    Low vitamin D level    Mechanical low back pain    Menopausal and postmenopausal disorder    Osteoarthritis, multiple sites    Osteoporosis without current pathological fracture    Otalgia, right    Otalgia    Peritoneal hematoma    Presence of cardiac and vascular implant and graft    Primary localized osteoarthritis of left hip    Scoliosis    Segmental and somatic dysfunction of pelvic region    Urine abnormality    Vaginal dryness, menopausal    Ventricular tachycardia (Multi)    Wasp sting    Small intestinal bacterial overgrowth (SIBO)    SHANE (obstructive sleep apnea)    Dyslipidemia    Primary osteoarthritis of both first carpometacarpal joints    Other constipation    Dietary counseling and surveillance        Food and Nutrition Hx:    She was having abdominal pain and wasn't sure what that was. She also knows that her protein was low and Bun high.  She is following a FODMAP diet.  She has struggled with SIBO and antibiotics do  help.  She was eating 100 grams of protein because she was told to and it was too much - he BUN went down when she lowered her protein intake to only  50 grams.  She eats every 2 hours.  She eats very healthy, gluten free.    We reviewed all the foods she eats very healthy and seems to think she has plenty of choices.       24 Diet Recall:  [facility]  Meal  1:  Meal 2:  Meal 3:  Snacks:  Beverages:  BED    Weight change:    Significant Weight Change: No  CW: 113#  BMI: 19.4  UBW:  110-115#  Wt HX:  Wt Readings from Last 10 Encounters:   12/03/24 51.3 kg (113 lb)   11/21/24 51.7 kg (114 lb)   11/13/24 51.7 kg (114 lb)   10/25/24 49.9 kg (110 lb)   10/09/24 52.9 kg (116 lb 9.6 oz)   09/17/24 51.3 kg (113 lb)   08/27/24 51.6 kg (113 lb 12.8 oz)   08/02/24 50.8 kg (111 lb 15.9 oz)   06/19/24 50.8 kg (112 lb)   05/15/24 51.1 kg (112 lb 9.6 oz)          CMP trend:    Recent Labs     11/13/24  1144 09/18/24  1044 08/14/24  1342 04/15/24  1107   GLUCOSE 88 91  --  88    139  --  137   K 4.5 4.7  --  4.5    102  --  100   CO2 25 26  --  28   ANIONGAP 16 16  --  14   BUN 34* 33*  --  34*   CREATININE 0.72 0.78  --  0.73   EGFR 88 80  --  86   CALCIUM 8.7 9.1  --  9.3   ALBUMIN 4.2 4.5  --  4.4   ALKPHOS 80 62  --  65   PROT 5.9* 6.1*  --  6.0*   AST 26 22  --  23   BILITOT 0.6 0.6  --  0.6   ALT 25 23 24 23   , RFP trend:   Recent Labs     11/13/24  1144 09/18/24  1044 04/15/24  1107 06/01/23  1033 04/04/23  1507 06/24/18  1729 06/23/18  0327 06/22/18  0530   GLUCOSE 88 91 88   < > 80   < > 90 89    139 137   < > 136   < > 135* 139   K 4.5 4.7 4.5   < > 4.4   < > 4.4 4.1    102 100   < > 98   < > 103 104   CO2 25 26 28   < > 29   < > 28 26   ANIONGAP 16 16 14   < > 13   < > 8* 13   BUN 34* 33* 34*   < > 29*   < > 15 15   CREATININE 0.72 0.78 0.73   < > 0.71   < > 0.48* 0.46*   EGFR 88 80 86   < >  --   --   --   --    CALCIUM 8.7 9.1 9.3   < > 9.5   < > 8.6 8.4*   PHOS  --   --   --   --  3.6  --  3.9 3.9   ALBUMIN 4.2 4.5 4.4   < > 4.5   < > 3.4 3.2*    < > = values in this interval not displayed.   , Lipid Panel trend:    Recent Labs     05/06/24  0836 04/15/24  1107 11/14/23  0927 04/04/23  1507 10/26/22  0924 03/07/22  0827   CHOL 151 162 169 191 163 223*   HDL 61.3 58.8 65.6 68.0 63.0 68.9   LDLCALC 77 48 86  --   --   --    LDLF  --   --   --  97 79  "142*   VLDL 12 55* 18 26 21 12   TRIG 62 277* 88 132 105 61   , Liver trend:   Recent Labs     11/13/24  1144 09/18/24  1044 08/14/24  1342 04/15/24  1107   ALKPHOS 80 62  --  65   AST 26 22  --  23   ALT 25 23 24 23   BILITOT 0.6 0.6  --  0.6   , Hgb A1c trend: No results for input(s): \"HGBA1C\" in the last 53285 hours., Vit D:   Lab Results   Component Value Date    VITD25 52 08/14/2024    , Iron Panel: No results found for: \"IRON\", \"TIBC\", \"FERRITIN\" , and Vit B12:   Lab Results   Component Value Date    BGLMSYTK33 632 04/15/2024           Food Preparation: Patient  Cooking Skills/Barriers: None reported  Grocery Shopping: Patient        Allergies: None  Intolerance: FODMAP  Appetite: Good  three meals and 2 snacks  Intake: >75%  GI Symptoms : SIBO good until it comes back    Swallowing Difficulty: No problems with swallowing  Dentition : own    Eating Out Type: Dinner and Restaurant  a few  times per year  Convenience Foods: Denies     Types of Activities: Walking, Yoga, and Swimming  Duration: 1-2 hours daily    Sleep duration/quality : 7+ hours and continuous sleep w/melatonin  Sleep disorders: obstructive sleep apnea, wears CPAP    Supplements: Multivitamin, Vitamin B12, Vitamin D, Calcium, and Zinc daily  Vit C, baby ASA, took probiotic and prebiotic and she said it upset her stomach more    Nutrition Focused Physical Exam:    Performed/Deferred: Deferred as pt visually appears well-nourished with no signs of malnutrition        Malnutrition Present: No      Nutrition Diagnosis:    Diagnosis Statement 1:  Diagnosis Status: New  Diagnosis : Altered nutrition related lab values  related to unknown cause as evidenced by  low total protein, she was told to increase her protein intake which she believes increased her Bun. Her total protein did not improve    Diagnosis Statement 2:  Diagnosis Status: New  Diagnosis : Altered GI function  related to  SIBO  as evidenced by  diet and nutrition history recall, improved " with FODMAP diet and antibiotics      Nutrition Interventions:    1) We discussed that she does not need more than 50 grams of protein. Protein labs are not correlated to the amount of protein we eat. They can be correlated to medical issues or inflammation.    Nutrition Goals:  Nutrition Goals : Appropriate intake of protein, continued stable GI issues    Nutrition Recommendations:  1)

## 2024-12-18 ENCOUNTER — NUTRITION (OUTPATIENT)
Dept: NUTRITION | Facility: CLINIC | Age: 74
End: 2024-12-18
Payer: MEDICARE

## 2024-12-18 DIAGNOSIS — Z71.3 DIETARY COUNSELING AND SURVEILLANCE: Primary | ICD-10-CM

## 2024-12-18 DIAGNOSIS — R79.89 LOW SERUM TOTAL PROTEIN LEVEL: ICD-10-CM

## 2024-12-18 PROCEDURE — 97802 MEDICAL NUTRITION INDIV IN: CPT | Performed by: DIETITIAN, REGISTERED

## 2024-12-23 ENCOUNTER — TELEPHONE (OUTPATIENT)
Dept: PRIMARY CARE | Facility: CLINIC | Age: 74
End: 2024-12-23

## 2024-12-23 ENCOUNTER — APPOINTMENT (OUTPATIENT)
Dept: PRIMARY CARE | Facility: CLINIC | Age: 74
End: 2024-12-23
Payer: MEDICARE

## 2024-12-23 ENCOUNTER — TELEPHONE (OUTPATIENT)
Dept: SLEEP MEDICINE | Facility: HOSPITAL | Age: 74
End: 2024-12-23

## 2024-12-23 VITALS — SYSTOLIC BLOOD PRESSURE: 122 MMHG | DIASTOLIC BLOOD PRESSURE: 68 MMHG

## 2024-12-23 DIAGNOSIS — G47.00 INSOMNIA, UNSPECIFIED TYPE: Primary | ICD-10-CM

## 2024-12-23 DIAGNOSIS — G47.9 SLEEP DISTURBANCE: Primary | ICD-10-CM

## 2024-12-23 DIAGNOSIS — K63.8219 SMALL INTESTINAL BACTERIAL OVERGROWTH (SIBO): ICD-10-CM

## 2024-12-23 DIAGNOSIS — G47.9 SLEEP DISTURBANCE: ICD-10-CM

## 2024-12-23 PROCEDURE — 1158F ADVNC CARE PLAN TLK DOCD: CPT | Performed by: INTERNAL MEDICINE

## 2024-12-23 PROCEDURE — 1123F ACP DISCUSS/DSCN MKR DOCD: CPT | Performed by: INTERNAL MEDICINE

## 2024-12-23 PROCEDURE — 99214 OFFICE O/P EST MOD 30 MIN: CPT | Performed by: INTERNAL MEDICINE

## 2024-12-23 RX ORDER — DOXEPIN 6 MG/1
3 TABLET, FILM COATED ORAL NIGHTLY
Qty: 15 TABLET | Refills: 0 | Status: SHIPPED | OUTPATIENT
Start: 2024-12-23 | End: 2025-01-22

## 2024-12-23 RX ORDER — DOXEPIN HYDROCHLORIDE 10 MG/ML
10 SOLUTION ORAL NIGHTLY
Qty: 120 ML | Refills: 1 | Status: SHIPPED | OUTPATIENT
Start: 2024-12-23 | End: 2024-12-23 | Stop reason: SDUPTHER

## 2024-12-23 RX ORDER — DOXEPIN HYDROCHLORIDE 10 MG/ML
10 SOLUTION ORAL NIGHTLY
Qty: 120 ML | Refills: 1 | Status: SHIPPED | OUTPATIENT
Start: 2024-12-23 | End: 2025-12-23

## 2024-12-23 ASSESSMENT — ENCOUNTER SYMPTOMS
OCCASIONAL FEELINGS OF UNSTEADINESS: 0
LOSS OF SENSATION IN FEET: 0
DEPRESSION: 0

## 2024-12-23 NOTE — PROGRESS NOTES
Subjective   Patient ID: Javier Calderón is a 74 y.o. female who presents for FUV.    HPI  Patient in for a visit  Saw Dr Naranjo thought it was her CIBO again   May be due to xylitol for her dry mouth so now just using coconut oils  Also seeing her dietician , cut back on her protein to 50 mg from 100 mg  Now using her cpap ,   Sleep specialist was thinking of doxepin but pt also    Review of Systems  General: Denies fever, chills, night sweats,  Eyes: Negative for recent visual changes  Ears, Nose, Throat :  Negative for hearing changes, sinus discomfort  Dermatologic: Negative for new skin conditions, rash  Respiratory: Negative for wheezing, shortness of breath, cough  Cardiovascular: Negative for chest pain, palpitations, or leg swelling  Gastrointestinal: Negative for nausea/vomiting, abdominal pain, changes in bowel habits  Genitourinary Negative for Urinary Incontinence  urgency , frequency, discomfort   Musculoskeletal: see hpi  Neurological: Negative for headaches, dizziness    Previous history  Past Medical History:   Diagnosis Date    Abnormal finding of lung 09/09/2023    Abnormal levels of other serum enzymes 03/08/2022    Elevated liver enzymes    Abnormal renal function 09/09/2023    Abnormal weight loss 09/27/2018    Weight loss    Aortic thrombus (Multi) 09/09/2023    Dark stools 09/09/2023    Diarrhea, unspecified 12/31/2018    Diarrhea    Disorder of kidney and ureter, unspecified 11/08/2022    Abnormal renal function    Disorder of teeth and supporting structures, unspecified 02/22/2019    Dental disorder    Elevated liver enzymes 09/09/2023    Encounter for general adult medical examination without abnormal findings 03/08/2022    Encounter for Medicare annual wellness exam    Encounter for immunization 03/08/2022    Need for pneumococcal vaccination    Encounter for other screening for malignant neoplasm of breast 02/21/2020    Screening for malignant neoplasm of breast    Encounter for  other screening for malignant neoplasm of breast 06/09/2022    Breast screening    Encounter for screening mammogram for malignant neoplasm of breast 02/14/2018    Visit for screening mammogram    Gastro-esophageal reflux disease without esophagitis 06/23/2020    Esophageal reflux    Hypo-osmolality and hyponatremia 09/25/2019    Hyponatremia    Injury of peritoneum, initial encounter 08/23/2019    Peritoneal hematoma    Long term (current) use of anticoagulants 09/28/2018    Chronic anticoagulation    Otalgia, right ear 12/31/2018    Otalgia, right    Otalgia, unspecified ear 12/31/2018    Otalgia    Other abnormality of red blood cells 11/08/2022    Elevated MCV    Other conditions influencing health status 11/22/2021    Oral disorder    Other fatigue 03/19/2020    Fatigue    Other fecal abnormalities 06/29/2018    Dark stools    Other specified abnormal findings of blood chemistry 11/16/2022    Low vitamin D level    Other specified abnormal findings of blood chemistry 02/21/2022    Abnormal liver function test    Other specified diseases of intestine     Small intestinal bacterial overgrowth (SIBO)    Other specified symptoms and signs involving the circulatory and respiratory systems 11/17/2017    Abnormal finding of lung    Personal history of diseases of the blood and blood-forming organs and certain disorders involving the immune mechanism 03/18/2020    History of thrombocytosis    Personal history of diseases of the blood and blood-forming organs and certain disorders involving the immune mechanism 03/18/2020    History of thrombocytosis    Personal history of other specified conditions 12/20/2022    History of nausea    Personal history of other specified conditions 12/20/2022    History of abdominal pain    Pharyngoesophageal dysphagia 09/09/2023    Pulmonary nodule 09/09/2023    Pure hypercholesterolemia, unspecified 11/08/2022    Elevated LDL cholesterol level    Pure hyperglyceridemia 11/16/2022     High triglycerides    Rash and other nonspecific skin eruption 12/30/2020    Rash    Toxic effect of venom of wasps, accidental (unintentional), initial encounter 09/18/2017    Wasp sting    Unspecified abnormal findings in urine 03/04/2022    Urine abnormality    Unspecified menopausal and perimenopausal disorder 01/23/2018    Menopausal and postmenopausal disorder     Past Surgical History:   Procedure Laterality Date    CATARACT EXTRACTION  01/08/2018    Cataract Surgery    CT ABDOMEN PELVIS ANGIOGRAM W AND/OR WO IV CONTRAST  6/12/2018    CT ABDOMEN PELVIS ANGIOGRAM W AND/OR WO IV CONTRAST 6/12/2018 [facility]    CT ABDOMEN PELVIS ANGIOGRAM W AND/OR WO IV CONTRAST  6/14/2018    CT ABDOMEN PELVIS ANGIOGRAM W AND/OR WO IV CONTRAST 6/14/2018 [facility]    IR ANGIOGRAM INFERIOR EPIGASTRIC PELVIC  6/14/2018    IR ANGIOGRAM INFERIOR EPIGASTRIC PELVIC 6/14/2018 [facility]    IR EMBOLIZATION LYMPH NODE Bilateral 6/14/2018    IR EMBOLIZATION LYMPH NODE 6/14/2018 [facility]    IR EMBOLIZATION LYMPH NODE Bilateral 6/14/2018    IR EMBOLIZATION LYMPH NODE 6/14/2018 [facility]    OTHER SURGICAL HISTORY  03/07/2018    Programming And Iterative Adjustment Of Implantable Loop Recorder    OTHER SURGICAL HISTORY  11/08/2022    Esophagogastroduodenoscopy     Social History     Tobacco Use    Smoking status: Never    Smokeless tobacco: Never   Vaping Use    Vaping status: Never Used   Substance Use Topics    Alcohol use: Not Currently    Drug use: Never     Family History   Problem Relation Name Age of Onset    Coronary artery disease Mother      Atrial fibrillation Mother      Heart failure Mother      Hypertension Mother      Coronary artery disease Father      Other (CVA) Father      Heart failure Father      Hypertension Father      Other (Mitral valve replacement) Father      Sleep apnea Father      Sleep apnea Sister      Polymyalgia rheumatica Brother      Sleep apnea Brother      No Known Problems Son  Akin     Coronary artery disease Other Multiple Family Members     Other (Diabetes mellitus) Other Family History     Lung cancer Other Family History     Brain cancer Other Family History     Pancreatic cancer Other Family History     Throat cancer Other Family History      Allergies   Allergen Reactions    Bee Venom Protein (Honey Bee) Unknown    Pollen Extracts Cough     Current Outpatient Medications   Medication Instructions    ascorbic acid,sod/zinc gluc,ox (ZINC AND C ORAL) Take by mouth.    aspirin 81 mg EC tablet 1 tablet, Daily    azelastine (Astelin) 137 mcg (0.1 %) nasal spray 1 spray, Each Nostril, 2 times daily, Use in each nostril as directed    b complex 0.4 mg tablet 1 tablet, Daily    budesonide-formoteroL (Symbicort) 160-4.5 mcg/actuation inhaler 2 puffs, inhalation, 2 times daily RT, Rinse mouth with water after use to reduce aftertaste and incidence of candidiasis. Do not swallow.    calcium carbonate 600 mg calcium (1,500 mg) tablet 1 tablet, Daily    cholecalciferol (Vitamin D-3) 25 MCG (1000 UT) tablet Take as directed.    denosumab (PROLIA) 60 mg, subcutaneous, Every 6 months    doxepin (SILENOR) 3 mg, oral, Nightly    estradiol (Estrace) 0.01 % (0.1 mg/gram) vaginal cream Insert into the vagina. Apply a pea sized amount of vaginal area daily x2 weeks, then three times a week as needed    estradiol (Estrace) 0.01 % (0.1 mg/gram) vaginal cream Discard the applicator and apply a pea sized amount to the vaginal opening and just inside the vagina 3 times/week    fluticasone (Cutivate) 0.05 % cream Topical, As needed, Apply inch.    fluticasone (Flonase) 50 mcg/actuation nasal spray 2 sprays, Daily    MELATONIN ORAL Take by mouth.    MULTIVITAMIN ORAL 1 capsule, Daily    mupirocin (Bactroban) 2 % ointment 3 times daily RT    nitroglycerin (NITROSTAT) 0.4 mg, sublingual, Every 5 min PRN, For up to 3 doses as needed. Call 911 if pain persists.    triamcinolone (Kenalog) 0.1 % cream 1 Application        Objective       Physical Exam  Vital Signs: as recorded above  General: Well groomed, well nourished   Orientation:  Alert , oriented to time, place , and person   Mood and Affect:  Cooperative , no apparent distress normal affect  Skin: Good color, good turgor  Eyes: Extra ocular muscle movements intact, anicteric sclerae  Neck: Supple, full range of movement  Chest: Normal breath sounds, normal chest wall exam, symmetric, good air entry, clear to auscultation  Heart: Regular rate and rhythm, without murmur, gallop, or rubs  BACK:  no CTLS spine tenderness, no flank tenderness  Extremities: full range of movement  bilateral UE and bilateral LE,  no lower extremity edema  Neurological: Alert, oriented, cranial nerves II-XII grossly intact except for visual acuity  Sensation:  Intact   Gait: normal steady      Assessment/Plan   Javier Calderón is a 74 y.o. female who presents for the concerns below:    Problem List Items Addressed This Visit    None      SIBO now under control had seen Dr Naranjo the doxycycline     Dry mouth will be using coconut oil since all others have xylitol which was thought to have caused exacerbation of her GI SIBO    OBSTRUCTIVE SLEEP APNEA - stable , control of sleep apnea with use of cpap machine during sleep, positional therapy may also help by sleeping with full body length pillow to maintain the lateral recumbent position or elevation of the head of the bed by 6-8 inches and another option if these preceding treatments are not tolerated/effective is for a dental fabrication of an oral appliance to promote mandibular advancement during sleep   Avoidance of alcohol, weight loss , and practicing proper sleep habits.  If the patient experiences excessive daytime somnolence, potentially hazardous activity , including driving when feeling sleepy , these activities should be avoided until proper treatment or readjustment of current treatment is done with Sleep Medicine  specialist.    Seeing Vanna Livingston, will advise her of our trial of doxepin liquid and pt ines lsee her next month in ffup          Discussed with:   Return in : 6 months     Portions of this note were generated using digital voice recognition software, and may contain grammatical errors       Andrew Ayoub MD  12/23/24  12:36 PM

## 2024-12-23 NOTE — TELEPHONE ENCOUNTER
Pt was wondering if you could send in the doxepin solution to [facility] in [place] because she can get it for half the price with [facility].

## 2024-12-23 NOTE — TELEPHONE ENCOUNTER
After taking Melatonin 5mg Extended, the patient called and stated still groggy and sleepy until 10:30 AM the next morning.I called her and discussed with her to start 3 mg for a 1-month trial and follow me up for 1 month to evaluate the effectiveness.

## 2024-12-26 ENCOUNTER — DOCUMENTATION (OUTPATIENT)
Dept: SLEEP MEDICINE | Facility: CLINIC | Age: 74
End: 2024-12-26
Payer: MEDICARE

## 2024-12-26 NOTE — PROGRESS NOTES
The patient reported taking 0.3 to 0.4 mL of the doxepin liquid, which equals 3 to 4 mg of doxepin, 30 minutes before bedtime. She reports that although she woke up some, she still had a pleasant sleep. There were no side effects that I could tell, and no drowsiness during the day. She reports that it was a night trial, but so far, so good, and she is glad we found something that might work.

## 2024-12-27 ENCOUNTER — HOSPITAL ENCOUNTER (OUTPATIENT)
Dept: CARDIOLOGY | Facility: CLINIC | Age: 74
Discharge: HOME | End: 2024-12-27
Payer: MEDICARE

## 2024-12-27 DIAGNOSIS — I48.19 ATRIAL FIBRILLATION, PERSISTENT (MULTI): ICD-10-CM

## 2025-01-07 ENCOUNTER — APPOINTMENT (OUTPATIENT)
Dept: GASTROENTEROLOGY | Facility: EXTERNAL LOCATION | Age: 75
End: 2025-01-07
Payer: MEDICARE

## 2025-01-14 DIAGNOSIS — I48.91 A-FIB (MULTI): ICD-10-CM

## 2025-01-14 DIAGNOSIS — I48.92 ATRIAL FIBRILLATION AND FLUTTER: Primary | ICD-10-CM

## 2025-01-14 DIAGNOSIS — I48.91 ATRIAL FIBRILLATION AND FLUTTER: Primary | ICD-10-CM

## 2025-01-15 NOTE — PROGRESS NOTES
[facility] Sleep Medicine  [address]  [facility]  [address]  [facility] [id number]     [facility] Sleep Medicine Clinic  Follow Up Visit Note  Virtual or Telephone Consent    A telephone visit (audio only) between the patient (at the originating site) and the provider (at the distant site) was utilized to provide this telehealth service.   Verbal consent was requested and obtained from Javier Calderón on this date, 01/21/25 for a telehealth visit.         Subjective  Patient ID: Javier Calderón is a 74 y.o. female with past medical history significant for A-Fib, A flutter, and  OBSTRUCTIVE SLEEP APNEA.     1/21/25: UPDATED: The patient had a phone visit with me to evaluate her Sleep disturbance. Her over 4 hours pap compliance rate was  97 %, and Her ESS  is 2  today. She is taking Melatonin 10 mg because Doxepin caused her constipation. She reports waking up several times a night, but her body is doing that way. She is energetic.     12/26/24: The patient reported taking 0.3 to 0.4 mL of the doxepin liquid, which equals 3 to 4 mg of doxepin, 30 minutes before bedtime. She reports that although she woke up some, she still had a pleasant sleep. There were no side effects that I could tell, and no drowsiness during the day. She reports that it was a night trial, but so far, so good, and she is glad we found something that might work.      11/21/24: UPDATED: The patient returned to the clinic to review her initial pap compliance. Her over 4 hours pap compliance rate was  97 %, and Her ESS  is 9  today. She c/o of still waking up during the night; she started taking Melatonin 3-5 mg, which made her extend to wake her up for 1.5 hours. I also instructed her to get 10 mg of melatonin 10 mg to prolong their sleep and to change the heating program to make her comfortable. The [facility] liaison, Yanique, provided her with an F30 small mask to try;  she felt comfortable with it.      9/25/2024: The patient had a virtual visit with me for comprehensive sleep medicine evaluation due to sleep apnea to review her MILD sleep study to treat her MILD  sleep apnea. The desensitization strategy, 30-day free mask return policy, and insurance requirements are all discussed with the patient. The patient verbalized understanding it. Her ESS: 15, and ROCHELLE  is 21 today.     HPI  Patient had been having these symptoms for the past 10 years. Patient had PSG in 2024 which showed SHANE but no CPAP started yet.        SLEEP STUDY HISTORY: (personally reviewed raw data such as interpretation report, data sheet, hypnogram, and titration table if available and applicable)  6/28/24: BMI: 19.2, RDI 3%: 13.4/hr, RDI 4%: 7/hr, Sadiq: 85%, < 88%: 2.5 minutes     SLEEP-WAKE SCHEDULE  Bedtime: 8 PM on weekdays, same on weekends  Subjective sleep latency: 20-60 minutes  Problems falling asleep: No  Number of awakenings: 4-5 times per night spontaneously for unknown reasons  Falls back asleep in  minutes  Problems staying asleep: Yes  Final wake time: 4 AM on weekdays, same on weekends  Shift work: No  Naps: No  Average sleep duration (excluding naps): 6-7 hours     SLEEP ENVIRONMENT  Sleep location: bed   Sleep status: sleeps alone  Room is dark:  Yes  Room is quiet: Yes  Room is cool: Yes  Bed comfort: good     SLEEP HABITS:   Activities before bedtime: read a book  Activities in bed: no  Preferred sleep position: right side     SLEEP ROS: (after cpap)  Night symptoms: Denies for snoring, witnessed apnea, nasal congestion , mouth breathing, nocturnal cough, and nocturia  Morning symptoms: Denies for unrefreshing sleep, morning dry mouth, and sleep inertia  Daytime symptoms Denies for excessive daytime sleepiness, fatigue, and irritability in daytime  Hypersomnia / narcolepsy symptoms: Patient denies symptoms of a hypersomnolence disorder such as sleep paralysis, sleep-related  hallucinations, and cataplexy.   RLS symptoms: Patient admits having urge to move legs that occurs at rest (sitting, getting into bed, or lying n bed), worse in the evening, and relieved temporarily with movement.   Frequency of RLS symptoms: no more after cpap  RLS symptoms affect sleep onset: no  Movements in sleep: Delines for frequent leg kicks / jerks at night while asleep  Parasomnia symptoms: Patient denies symptoms of parasomnia such as sleepwalking, sleeptalking, sleep-eating, acting out dreams, and nightmares.      WEIGHT: stable     REVIEW OF SYSTEMS: All other systems have been reviewed and are negative.     PERTINENT SOCIAL HISTORY:  Occupation: retired RN  Smoking: No   ETOH: No   Marijuana: No   Caffeine: No  Sleep aids: No   Claustrophobia: No      PERTINENT PAST SURGICAL HISTORY:  non-contributory     PERTINENT FAMILY HISTORY:  sleep apnea- father, brother, sisters     Active Problems, Allergy List, Medication List, and PMH/PSH/FH/Social Hx have been reviewed and reconciled in chart. No significant changes unless documented in the pertinent chart section. Updates made when necessary.         Objective     REVIEW OF SYSTEMS  All other systems have been reviewed and are negative.     ALLERGIES  Allergies           Allergies   Allergen Reactions    Bee Venom Protein (Honey Bee) Unknown            MEDICATIONS  Current Medications               Current Outpatient Medications   Medication Sig Dispense Refill    aspirin 81 mg EC tablet Take 1 tablet (81 mg) by mouth once daily.        b complex 0.4 mg tablet Take 1 tablet by mouth once daily.        calcium carbonate 600 mg calcium (1,500 mg) tablet Take 1 tablet (1,500 mg) by mouth once daily.        cholecalciferol (Vitamin D-3) 25 MCG (1000 UT) tablet Take as directed.        denosumab (Prolia) 60 mg/mL syringe Inject 1 mL (60 mg total) under the skin every 6 months. 1 mL 1    estradiol (Estrace) 0.01 % (0.1 mg/gram) vaginal cream Insert into the  vagina. Apply a pea sized amount of vaginal area daily x2 weeks, then three times a week as needed        estradiol (Estrace) 0.01 % (0.1 mg/gram) vaginal cream Discard the applicator and apply a pea sized amount to the vaginal opening and just inside the vagina 3 times/week 42.5 g 1    fluticasone (Cutivate) 0.05 % cream Apply topically if needed. Apply inch.        fluticasone (Flonase) 50 mcg/actuation nasal spray Administer 2 sprays into each nostril once daily.        MULTIVITAMIN ORAL Take 1 capsule by mouth once daily.        mupirocin (Bactroban) 2 % ointment Apply topically 3 times a day. Apply ointment topically to affected area.        nitroglycerin (Nitrostat) 0.4 mg SL tablet Place 1 tablet (0.4 mg) under the tongue every 5 minutes if needed for chest pain. For up to 3 doses as needed. Call 911 if pain persists.        triamcinolone (Kenalog) 0.1 % cream Apply 1 Application topically.          No current facility-administered medications for this visit.              Physical Exam  Constitutional:alert and oriented to time, place, and person     PAP Adherence:  DURABLE MEDICAL EQUIPMENT COMPANY: [employer]  Machine: THERAPY: RESMED AIRSENSE 11 PRESSURE SETTINGS: 4 cm H2O - 10 cm H2O  Mask: P10 small   Issues with therapy: ISSUES WITH THERAPY: denies  Benefits with PAP: PERCEIVED BENEFITS OF PAP: refreshing sleep reduced daytime sleepiness decreased or no snoring better sleep quality     A PAP adherence download was obtained and data was reviewed personally today in clinic. (see scanned document in EPIC)           Assessment/Plan  Javier Calderón is a 74 y.o. female who is seen  for mild obstructive sleep apnea. Risk factors of sleep apnea as well as cardiometabolic and neurocognitive sequelae associated with untreated sleep apnea were also discussed. Lastly, patient was advised to avoid driving vehicle or operating heavy machinery when sleepy.      Javier Calderón with the following  problems:     # OBSTRUCTIVE SLEEP APNEA :  -Great compliance, continue 4-10  cmH20 auto CPAP and renew annual supplies through [email or web address].  -[email or web address] liagela Chanel provide her a F30 I small full face sample of mask to try in the clinic, she report being comfortable with it.    -Sleep apnea and PAP therapy education were provided at length in the clinic today. Javier Calderón verbalized understanding.  -Emphasized diet, exercise, and weight loss in the clinic, as were non-supine sleep, avoiding alcohol in the late evening, and driving or operating heavy machinery when sleepy.  -Javier Calderónverbalizes understanding of the above instructions and risks.     # CHRONIC SLEEP MAINTENANCE INSOMNIA:  -Taking Melatonin 10 mg to induce sleep.  -Sleep hygiene discuss in the clinic.     # ATRIAL FIBRILLATION:  -Denies headache, palpitation, and syncope in the clinic.  -Follows with PCP/ Cardiology     # XEROSTOMIA:  -Instruct Javier Calderónto purchase the Biotene gel to ease the dry mouth symptom,     # RESTLESS LEG SYNDROME:   -no more      RTC 6 months.     All of patient's questions were answered. She verbalizes understanding and agreement with my assessment and plan.

## 2025-01-21 ENCOUNTER — OFFICE VISIT (OUTPATIENT)
Dept: SLEEP MEDICINE | Facility: CLINIC | Age: 75
End: 2025-01-21
Payer: MEDICARE

## 2025-01-21 DIAGNOSIS — G47.00 INSOMNIA, UNSPECIFIED TYPE: Primary | ICD-10-CM

## 2025-01-21 DIAGNOSIS — I48.91 ATRIAL FIBRILLATION, UNSPECIFIED TYPE (MULTI): ICD-10-CM

## 2025-01-21 DIAGNOSIS — G47.33 OSA (OBSTRUCTIVE SLEEP APNEA): ICD-10-CM

## 2025-01-21 DIAGNOSIS — T63.441A ALLERGIC REACTION TO BEE STING: ICD-10-CM

## 2025-01-21 DIAGNOSIS — I48.92 ATRIAL FLUTTER, UNSPECIFIED TYPE (MULTI): ICD-10-CM

## 2025-01-21 ASSESSMENT — SLEEP AND FATIGUE QUESTIONNAIRES
HOW LIKELY ARE YOU TO NOD OFF OR FALL ASLEEP WHILE SITTING AND READING: WOULD NEVER DOZE
ESS-CHAD TOTAL SCORE: 2
HOW LIKELY ARE YOU TO NOD OFF OR FALL ASLEEP WHILE WATCHING TV: SLIGHT CHANCE OF DOZING
SITING INACTIVE IN A PUBLIC PLACE LIKE A CLASS ROOM OR A MOVIE THEATER: WOULD NEVER DOZE
HOW LIKELY ARE YOU TO NOD OFF OR FALL ASLEEP WHILE SITTING AND TALKING TO SOMEONE: WOULD NEVER DOZE
HOW LIKELY ARE YOU TO NOD OFF OR FALL ASLEEP WHILE SITTING QUIETLY AFTER LUNCH WITHOUT ALCOHOL: WOULD NEVER DOZE
HOW LIKELY ARE YOU TO NOD OFF OR FALL ASLEEP WHEN YOU ARE A PASSENGER IN A CAR FOR AN HOUR WITHOUT A BREAK: WOULD NEVER DOZE
HOW LIKELY ARE YOU TO NOD OFF OR FALL ASLEEP IN A CAR, WHILE STOPPED FOR A FEW MINUTES IN TRAFFIC: WOULD NEVER DOZE
HOW LIKELY ARE YOU TO NOD OFF OR FALL ASLEEP WHILE LYING DOWN TO REST IN THE AFTERNOON WHEN CIRCUMSTANCES PERMIT: SLIGHT CHANCE OF DOZING

## 2025-01-21 NOTE — PATIENT INSTRUCTIONS
[facility]  [address]  [facility]  [address]  [facility] [address]       Thank you for coming to the Sleep Medicine Clinic today! Your sleep medicine provider today was: DUNIA Masters Below is a summary of your treatment plan, patient education, other important information, and our contact numbers.    Dear Ms Javier Calderón       Your Sleep Provider Today: DUNIA Masters  Your Primary Care Physician: Andrew Ayoub MD   Your Referring Provider: Vanna Livingston APRN-CNP      Thank you for visiting  Sleep Medicine Clinic !     1.You are doing great, please continue using your pap to control the OBSTRUCTIVE SLEEP APNEA. Feel free to contact your DME company to get new supplies.    2. Please do not drive when you are sleepy and continue practicing the sleep hygiene as discussed in clinic.    3. FOR QUESTIONS AND CONCERNS:   a) : In case of problems with machine or mask interface, please contact your DME company first. [employer] is the company who provides you the machine and/or PAP supplies / accessories. If [employer] is your DME, you can reach them at [phone].   b):  Please call my office with issues or questions: [phone] ([place]); [phone] ([facility]); [phone] ([place])    If you have a CPAP or BiPAP machine at home, please bring the unit and all accessories including the power cord to your appointments unless I tell you otherwise. Please have knowledge of the DME company you worked with to receive your PAP device. If you have copies of any previous sleep testing completed outside of , please bring with you to clinic as well. This information will make our visits more productive.     If you are new to CPAP or BiPAP, please note the minimum usage insurance requires to continuing coverage for the equipment as noted by your DME company. Please discuss equipment  "issues (PAP unit, mask fit, humidification, etc.) with your DME company first.       In the event that you are running more than 10 minutes late to your appointment, I will kindly ask you to reschedule. Thanks.      TREATMENT PLAN     - Continue current machine settings. Continue using machine every night all night.   - Please read the \"Patient Education\" section below for more detailed information. Try implementing tips, reminders, strategies, and supportive management.   - If not yet done, please sign up for [employer] to make a future schedule, send prescription requests, or send messages.    Follow-up Appointment:   Follow-up in 6 months.    PATIENT EDUCATION     OBSTRUCTIVE SLEEP APNEA (SHANE) is a sleep disorder where your upper airway muscles relax during sleep and the airway intermittently and repetitively narrows and collapses leading to partially blocked airway (hypopnea) or completely blocked airway (apnea) which, in turn, can disrupt breathing in sleep, lower oxygen levels while you sleep and cause night time wakings. Because both apnea and hypopnea may cause higher carbon dioxide or low oxygen levels, untreated SHANE can lead to heart arrhythmia, elevation of blood pressure, and make it harder for the body to consolidate memory and facilitate metabolism (leading to higher blood sugars at night). Frequent partial arousals occur during sleep resulting in sleep deprivation and daytime sleepiness. SHANE is associated with an increased risk of cardiovascular disease, stroke, hypertension, and insulin resistance. Moreover, untreated SHANE with excessive daytime sleepiness can increase the risk of motor vehicular accidents.    Below are conservative strategies for SHANE regardless of SHANE severity are:   Positional therapy - Avoid sleeping on your back.   Healthy diet and regular exercise to optimize weight is highly encouraged.   Avoid alcohol late in the evening and sedative-hypnotics as these substances can make sleep " apnea worse.   Improve breathing through the nose with intranasal steroid spray, saline rinse, or antihistamines    Safety: Avoid driving vehicle and operating heavy equipment while sleepy. Drowsy driving may lead to life-threatening motor vehicle accidents. A person driving while sleepy is 5 times more likely to have an accident. If you feel sleepy, pull over and take a short power nap (sleep for less than 30 minutes). Otherwise, ask somebody to drive you.    Treatment options for sleep apnea include weight management, positional therapy, Positive Airway Therapy (PAP) therapy, oral appliance therapy, hypoglossal nerve stimulator (Inspire) and select airway surgeries.    Starting Positive Airway Pressure (PAP): You were ordered a device to wear when you sleep called PAP (Positive Airway Pressure) to treat your sleep apnea. The order will be submitted to a durable medical equipment (DME) company who will arrange setting you up with the device. They will provide all the necessary equipment and discuss use and maintenance of the device with you as well as mask fitting and process of replacing / renewing PAP supplies or accessories. Once you get the machine, please start using it immediately. You may not be successful right away and that is okay. Sirisha be certain that you keep trying nightly and reach out to DME if you are struggling or having issues with machine usage.     *Please follow-up with me in 1-2 months of starting CPAP to see how well it is working for you and to do some troubleshooting if needed. Also, please bring all PAP equipment with you to follow up appointments unless told otherwise.     Important things to keep in mind as you start PAP:  Insurance will monitor your usage during the first 90 days. You should use your PAP - all night, every night, and including all naps (especially if naps are more than 30 minutes) for your health. The bare minimum is to use your PAP device while sleeping for at least 4  hours per day at least 5-6 days per week.. Otherwise, your PAP device will be reclaimed by your DME company at 90 days.  There are many masks to choose from to wear with your PAP machine. If you are not comfortable with the first mask issued to you, call your DME company and ask for another option to try. You typically have a 30-day mask guarantee from the day you received your machine.   Discuss with your provider if you are having issues breathing with the machine or if the temperature or humidity feel uncomfortable.  Expect to have an adjustment period when you start your device. It helps to continuing wearing the machine every day for a period of time until you get more used to it. You can practice with wearing the mask alone if you need, then add in the PAP air pressure a few days later.   Reach out for help if you are struggling! The sleep medicine department can be reached at [phone]  We encourage you to download data monitoring apps to your phone. For [employer] 10/11 - MyAir shey. For [employer] - DreamMapper. Both apps are available in the She[employer] store for free and are a great tool to monitor your progress with your PAP device night to night.    Tips for success with PAP machine usage:  Comfortable and well-fitting mask  Appropriate pressure on the machine  Using humidification  Support from bed partner and clinical team      Maintaining your CPAP/BPAP device:    The humidification chamber (aka water tank or water chamber) needs to be filled with distilled water to prevent buildup of white deposits in the future. If you cannot find distilled water, you can use tap water but expect to have white deposits buildup seen after prolonged use with tap water. If you start seeing white deposits on the water chamber, you can clean it by filling it with equal parts of distilled white vinegar and water. Let the vinegar-water mixture sit for 2 hours, and then rinse it with running tap water.  Clean with soap and water then let it dry.     You should try to keep your machine clean in order to work well. Here are some tips to clean PAP supplies / accessories:    Clean the humidification chamber (aka water tank) as well as your mask and tubing at least once a week with soap and water.   Alternatively, you can fill a sink or basin with warm water and add a little mild detergent, like Ivory dish soap. Gently wipe your supplies with the soapy water to free all the oils and dirt that may have collected. Once that's done, rinse these items with clean water until the soap is gone and let them air dry. You can hang your tubing over the curtain debby in your bathroom so that it dries.  The mask insert (part of the mask that has contact with your skin) needs to be cleaned with soap and water daily. Another option is to wipe them down with CPAP wipes or baby wipes.    You should replace your mask and tubing frequently in order to prevent bacteria buildup, machine damage, and mask seal issues. The older the mask and hose, the high likelihood that there is bacteria buildup in it especially if they are not cleaned regularly. Dirty filters damage machines because build-up of dust and contaminants can cause machine to over-heat, and in time, damage the motor of machine. Cushions lose their seal over time as most masks are made of plastic and silicone while headgear is made of neoprene. These materials will break down with age and frequent use. Here is the recommended replacement schedule for PAP supplies / accessories:    Twice a month- disposable filters and cushions for nasal mask or nasal pillows.  Once a month- cushion for full face mask  Every 3 months- mask with headgear and PAP tubing (standard or heated hose)  Every 6 months- reusable filter, water chamber, and chin strap     Other useful information:    Some people do not put water in the tank while other people prefers to put water in the tank to prevent mouth  dryness. Try to experiment to determine which is more comfortable for you.   In general, new machines have 2 years warranty on parts while health insurance allows you to have a new machine once every 5 years.     Common issues with PAP machine:    Mask gets dislodged when turning to the side: Consider getting a CPAP pillow or switching to a mask with hose on top.     Dry mouth:  Your machine has built-in humidifier that heats up the air to prevent dry mouth. It can be adjusted to your comfort. You can try that first and increase setting one level one night at a time to check which setting is comfortable and effective in lessening dry mouth. In some patients with heated hose, adjusting tube temperature to make air warmer can improve dry mouth. If dry mouth persists despite adjusting humidity or tube temperature setting, may apply OTC Biotene gel over the gums at bedtime.  If Biotene gel is not effective, consider trying XEROSTOM gel from [employer].  Also, eliminate or reduce dose of medications that can cause dry mouth if possible. Lastly, may try getting a separate room humidifier machine.    Airleaks: Please call DME as they may need to adjust your mask or refit you with a different kind or different size of mask. In addition, you can ask DME for tips on getting a good mask seal and mask fit.     Difficulty tolerating the mask: Contact your DME to try a different kind of mask and/or call office to get a referral to Sleep Psychologist for CPAP desensitization. CPAP desensitization technique is a set of strategies that helps patient cope with claustrophobia and anxiety related to wearing mask. Alternatively, we can do a daytime mini-sleep study called PAP-nap trial wherein you will try on different kinds of mask and the sleep technician will try different pressure settings on CPAP and BPAP machines to see which specific pressure is tolerable and comfortable for you.     Water droplets or moisture within the hose  "and/or mask: This is called rain-out and it is caused by condensation of too much heated humidity on the cooler walls of the hose. If you have rain-out, turn down humidity settings or get a heated hose. If you already have a heated hose, turn up the \"tube temperature\" of the heated hose. Alternatively, if you don't want to get a heated hose or warmer air, may wrap the CPAP hose with stockings to keep it somewhat warm. Also, you need to place the machine on the floor and lower the hose so that water won't travel upward towards your mask.     PAP desensitization techniques: If you have concerns about something being on your face at night, you can start by getting used to it before trying to sleep with it as follows:      Sit in a comfortable chair or bed. Connect the mask and hose to the CPAP/BPAP machine. Hold the mask on your face (without straps on) and turn on the machine. Practice breathing with the mask on while awake sitting and watching television, reading, or performing a sedentary activity during the day for 5-10 minutes and then take it off.  If tolerated, try again and gradually build up to longer periods of time. If not tolerated, try and try again until it is more comfortable as you become more desensitized. If you are able to use it for at least 20-30 minutes, move unto the next step.     Sit in a comfortable chair or bed. Connect the mask and hose to the CPAP/BPAP machine. Strap the mask on your head and turn on the machine. Practice breathing with the mask and headgear on while awake sitting and watching television, reading, or performing a sedentary activity. Start with 5-10 minutes and gradually increasing time until you can wear it comfortably for at least 20-30 minutes, then move to the next step.    Take a shorter daytime nap with machine turned on while you are in a reclined position in bed, sofa, or recliner. Start with 5-10 minute nap and gradually increase up to 30 minutes. It is not " important whether you fall asleep or not. The goal is to rest comfortably with PAP machine on.     Reintroduce PAP machine into nighttime sleep. You can begin using it a portion of the night and gradually increase up to entire night.     Proceed from one step to the next only when you are completely comfortable. If you feel any anxiety or discomfort, return to the previous step, then proceed again when comfortable.    Expect to “work” with your CPAP/BIPAP unit. It is important to try to relax when beginning CPAP/BIPAP therapy. Inhalation and exhalation should occur through the nose only. If you are unable to consistently breathe this way, do not panic or lose hope. There are other types of masks which allow you to breathe through your nose and/or your mouth. Also, in some patients, using intranasal steroid spray (e.g. Flonase or Nasocort or Fluticasone) 1 hour before bedtime and/or before putting on CPAP mask can help tolerate breathing through the mask.    Don't give up after a few attempts--some patients adjust quickly, while some patients need 3-4 weeks (or sometimes even longer) to be accustomed to CPAP therapy.  Contact your sleep medicine specialist if you have a significant change in weight since this may affect your pressure.    You can also go to the following EDUCATION WEBSITES for further information:   American Academy of Sleep Medicine http://sleepeducation.org  National Sleep Foundation: https://sleepfoundation.org  American Sleep Apnea Association: https://www.sleepapnea.org (for patients with sleep apnea)  Narcolepsy Network: https://www.narcolepsynetwork.org (for patients with narcolepsy)  [employer]: https://www.wakeupnarcolepsy.org (for patients with narcolepsy)  Hypersomnia Foundation: https://www.hypersomniafoundation.org (for patients with idiopathic hypersomnia)  RLS foundation: https://www.rls.org (for patients with restless leg syndrome)    IMPORTANT INFORMATION     Call 911 for  medical emergencies.  Our offices are generally open from Monday-Friday, 8 am - 5 pm.   There are no supporting services by either the sleep doctors or their staff on weekends and Holidays, or after 5 PM on weekdays.   If you need to get in touch with me, you may either call my office number or you can use [employer].  If a referral for a test, for CPAP, or for another specialist was made, and you have not heard about scheduling this within a week, please call scheduling at [phone] ([phone]).  If you are unable to make your appointment for clinic or an overnight study, kindly call the office or sleep testing center at least 48 hours in advance to cancel and reschedule.  If you are on CPAP, please bring your device's card and/or the device to each clinic appointment.   In case of problems with PAP machine or mask interface, please contact your [employer] (Durable Medical Equipment) company first. DME is the company who provides you the machine and/or PAP supplies.       PRESCRIPTIONS     We require 7 days advanced notice for prescription refills. If we do not receive the request in this time, we cannot guarantee that your medication will be refilled in time.    IMPORTANT PHONE NUMBERS     Sleep Medicine Clinic Fax: [phone]  Appointments (for Pediatric Sleep Clinic): [phone] - option 1  Appointments (for Adult Sleep Clinic): [phone] - option 2  Appointments (For Sleep Studies): [phone] - option 3  Behavioral Sleep Medicine: [phone]  Sleep Surgery: [phone]  Nutrition Service: [phone]  Weight management clinics with endocrinology: [phone]  Bariatric Services: [phone] (includes weight loss medications and weight loss surgery)  [facility]: [phone] (offers holistic approaches to weight management)  ENT (Otolaryngology): [phone]  Headache Clinic (Neurology): [phone]  Neurology: [phone]  Psychiatry: [phone]  Pulmonary Function  Testing (PFT) Center: [phone]  Pulmonary Medicine: [phone]  [facility]): [phone]      OUR SLEEP TESTING LOCATIONS     Our team will contact you to schedule your sleep study, however, you can contact us as follow:  Main Phone Line (scheduling only): [phone]), option 3  Adult and Pediatric Locations  [facility] (6 years and older): [facility] - 4th floor ([address]) After hours line: [phone]  [facility] (Main campus: All ages): [facility], 6th floor. After hours line: [phone]   [place] (5 years and older; younger considered on case-by-case basis): [address]; [address], [address]. Scheduling  After hours line: [phone]   [place] (6 years and older): [address]; [address]; [address]   [place] (6 years and older): [address] [facility], [address]  After hours line: [phone]   [place] (13 years and older) in [place]: [address], 2nd floor  After hours line: [phone]  [facility] (13 year and older): [address]  After hours line: [phone]     Adult Only Locations:   [place] (18 years and older): [address] [facility], 2nd floor   [place] (18 years and older): [address] [facility]; 4th floor  After hours line: [phone]  [facility] (18 years and older) at [place]: [address] [facility]  After hours line: [phone]     CONTACTING YOUR SLEEP MEDICINE PROVIDER AND SLEEP TEAM      For issues with your machine or mask interface, please call your DME provider first. [employer] for durable medical company. DME is the company who provides you the machine and/or PAP supplies / accessories.   To schedule, cancel, or reschedule SLEEP STUDY APPOINTMENTS, please call the Main Phone Line at [phone] - option 3.   To schedule, cancel, or reschedule CLINIC APPOINTMENTS, you can do it in  "\"MyChart\", call [phone] to speak with my  (Naty Davison), or call the Main Phone Line at [phone]) - option 2  For CLINICAL QUESTIONS or MEDICATION REFILLS, please call direct line for Adult Sleep Nurses at [phone].   Lastly, you can also send a message directly to your provider through \"My Chart\", which is the email service through your  Records Account: [email or web address]       Here at [facility], we wish you a restful sleep!   "

## 2025-01-30 ENCOUNTER — APPOINTMENT (OUTPATIENT)
Dept: SLEEP MEDICINE | Facility: CLINIC | Age: 75
End: 2025-01-30
Payer: MEDICARE

## 2025-01-31 ENCOUNTER — HOSPITAL ENCOUNTER (OUTPATIENT)
Dept: CARDIOLOGY | Facility: HOSPITAL | Age: 75
Discharge: HOME | End: 2025-01-31
Payer: MEDICARE

## 2025-01-31 VITALS
RESPIRATION RATE: 18 BRPM | TEMPERATURE: 97.7 F | BODY MASS INDEX: 19.31 KG/M2 | HEART RATE: 66 BPM | OXYGEN SATURATION: 96 % | HEIGHT: 64 IN | SYSTOLIC BLOOD PRESSURE: 134 MMHG | WEIGHT: 113.1 LBS | DIASTOLIC BLOOD PRESSURE: 65 MMHG

## 2025-01-31 DIAGNOSIS — I48.91 A-FIB (MULTI): ICD-10-CM

## 2025-01-31 DIAGNOSIS — I48.92 ATRIAL FIBRILLATION AND FLUTTER: ICD-10-CM

## 2025-01-31 DIAGNOSIS — I48.91 ATRIAL FIBRILLATION AND FLUTTER: ICD-10-CM

## 2025-01-31 DIAGNOSIS — I48.0 PAROXYSMAL ATRIAL FIBRILLATION (MULTI): ICD-10-CM

## 2025-01-31 LAB
BODY SURFACE AREA: 1.52 M2
BODY SURFACE AREA: 1.52 M2

## 2025-01-31 PROCEDURE — 33286 RMVL SUBQ CAR RHYTHM MNTR: CPT

## 2025-01-31 PROCEDURE — 7100000009 HC PHASE TWO TIME - INITIAL BASE CHARGE

## 2025-01-31 PROCEDURE — 33286 RMVL SUBQ CAR RHYTHM MNTR: CPT | Performed by: INTERNAL MEDICINE

## 2025-01-31 PROCEDURE — 2720000007 HC OR 272 NO HCPCS

## 2025-01-31 PROCEDURE — 7100000010 HC PHASE TWO TIME - EACH INCREMENTAL 1 MINUTE

## 2025-01-31 PROCEDURE — 99222 1ST HOSP IP/OBS MODERATE 55: CPT | Performed by: NURSE PRACTITIONER

## 2025-01-31 PROCEDURE — 33285 INSJ SUBQ CAR RHYTHM MNTR: CPT | Performed by: INTERNAL MEDICINE

## 2025-01-31 PROCEDURE — 33285 INSJ SUBQ CAR RHYTHM MNTR: CPT

## 2025-01-31 ASSESSMENT — PAIN - FUNCTIONAL ASSESSMENT
PAIN_FUNCTIONAL_ASSESSMENT: 0-10
PAIN_FUNCTIONAL_ASSESSMENT: 0-10

## 2025-01-31 ASSESSMENT — ENCOUNTER SYMPTOMS
ENDOCRINE NEGATIVE: 1
RESPIRATORY NEGATIVE: 1
GASTROINTESTINAL NEGATIVE: 1
PSYCHIATRIC NEGATIVE: 1
ALLERGIC/IMMUNOLOGIC NEGATIVE: 1
HEMATOLOGIC/LYMPHATIC NEGATIVE: 1
CARDIOVASCULAR NEGATIVE: 1
NEUROLOGICAL NEGATIVE: 1
EYES NEGATIVE: 1
CONSTITUTIONAL NEGATIVE: 1
MUSCULOSKELETAL NEGATIVE: 1

## 2025-01-31 ASSESSMENT — PAIN SCALES - GENERAL
PAINLEVEL_OUTOF10: 0 - NO PAIN
PAINLEVEL_OUTOF10: 0 - NO PAIN

## 2025-01-31 NOTE — H&P
History Of Present Illness  Javier Calderón is a 75 y.o. female presenting with afib/flutter, here for loop explant and replacement.    Past Medical History:  Past Medical History:   Diagnosis Date    Abnormal finding of lung 09/09/2023    Abnormal levels of other serum enzymes 03/08/2022    Elevated liver enzymes    Abnormal renal function 09/09/2023    Abnormal weight loss 09/27/2018    Weight loss    Aortic thrombus (Multi) 09/09/2023    Dark stools 09/09/2023    Diarrhea, unspecified 12/31/2018    Diarrhea    Disorder of kidney and ureter, unspecified 11/08/2022    Abnormal renal function    Disorder of teeth and supporting structures, unspecified 02/22/2019    Dental disorder    Elevated liver enzymes 09/09/2023    Encounter for general adult medical examination without abnormal findings 03/08/2022    Encounter for Medicare annual wellness exam    Encounter for immunization 03/08/2022    Need for pneumococcal vaccination    Encounter for other screening for malignant neoplasm of breast 02/21/2020    Screening for malignant neoplasm of breast    Encounter for other screening for malignant neoplasm of breast 06/09/2022    Breast screening    Encounter for screening mammogram for malignant neoplasm of breast 02/14/2018    Visit for screening mammogram    Gastro-esophageal reflux disease without esophagitis 06/23/2020    Esophageal reflux    Hypo-osmolality and hyponatremia 09/25/2019    Hyponatremia    Injury of peritoneum, initial encounter 08/23/2019    Peritoneal hematoma    Long term (current) use of anticoagulants 09/28/2018    Chronic anticoagulation    Otalgia, right ear 12/31/2018    Otalgia, right    Otalgia, unspecified ear 12/31/2018    Otalgia    Other abnormality of red blood cells 11/08/2022    Elevated MCV    Other conditions influencing health status 11/22/2021    Oral disorder    Other fatigue 03/19/2020    Fatigue    Other fecal abnormalities 06/29/2018    Dark stools    Other specified  abnormal findings of blood chemistry 11/16/2022    Low vitamin D level    Other specified abnormal findings of blood chemistry 02/21/2022    Abnormal liver function test    Other specified diseases of intestine     Small intestinal bacterial overgrowth (SIBO)    Other specified symptoms and signs involving the circulatory and respiratory systems 11/17/2017    Abnormal finding of lung    Personal history of diseases of the blood and blood-forming organs and certain disorders involving the immune mechanism 03/18/2020    History of thrombocytosis    Personal history of diseases of the blood and blood-forming organs and certain disorders involving the immune mechanism 03/18/2020    History of thrombocytosis    Personal history of other specified conditions 12/20/2022    History of nausea    Personal history of other specified conditions 12/20/2022    History of abdominal pain    Pharyngoesophageal dysphagia 09/09/2023    Pulmonary nodule 09/09/2023    Pure hypercholesterolemia, unspecified 11/08/2022    Elevated LDL cholesterol level    Pure hyperglyceridemia 11/16/2022    High triglycerides    Rash and other nonspecific skin eruption 12/30/2020    Rash    Toxic effect of venom of wasps, accidental (unintentional), initial encounter 09/18/2017    Wasp sting    Unspecified abnormal findings in urine 03/04/2022    Urine abnormality    Unspecified menopausal and perimenopausal disorder 01/23/2018    Menopausal and postmenopausal disorder        Past Surgical History:  Past Surgical History:   Procedure Laterality Date    CATARACT EXTRACTION  01/08/2018    Cataract Surgery    CT ABDOMEN PELVIS ANGIOGRAM W AND/OR WO IV CONTRAST  6/12/2018    CT ABDOMEN PELVIS ANGIOGRAM W AND/OR WO IV CONTRAST 6/12/2018 [facility]    CT ABDOMEN PELVIS ANGIOGRAM W AND/OR WO IV CONTRAST  6/14/2018    CT ABDOMEN PELVIS ANGIOGRAM W AND/OR WO IV CONTRAST 6/14/2018 [facility]    IR ANGIOGRAM INFERIOR EPIGASTRIC PELVIC  6/14/2018    IR  ANGIOGRAM INFERIOR EPIGASTRIC PELVIC 6/14/2018 [facility]    IR EMBOLIZATION LYMPH NODE Bilateral 6/14/2018    IR EMBOLIZATION LYMPH NODE 6/14/2018 [facility]    IR EMBOLIZATION LYMPH NODE Bilateral 6/14/2018    IR EMBOLIZATION LYMPH NODE 6/14/2018 [facility]    OTHER SURGICAL HISTORY  03/07/2018    Programming And Iterative Adjustment Of Implantable Loop Recorder    OTHER SURGICAL HISTORY  11/08/2022    Esophagogastroduodenoscopy          Social History:   reports that she has never smoked. She has never used smokeless tobacco. She reports that she does not currently use alcohol. She reports that she does not use drugs.     Family History:  Family History   Problem Relation Name Age of Onset    Coronary artery disease Mother      Atrial fibrillation Mother      Heart failure Mother      Hypertension Mother      Coronary artery disease Father      Other (CVA) Father      Heart failure Father      Hypertension Father      Other (Mitral valve replacement) Father      Sleep apnea Father      Sleep apnea Sister      Polymyalgia rheumatica Brother      Sleep apnea Brother      No Known Problems Son Akin     Coronary artery disease Other Multiple Family Members     Other (Diabetes mellitus) Other Family History     Lung cancer Other Family History     Brain cancer Other Family History     Pancreatic cancer Other Family History     Throat cancer Other Family History         Allergies:  Allergies   Allergen Reactions    Bee Venom Protein (Honey Bee) Unknown    Pollen Extracts Cough        Home Medications:  Current Outpatient Medications   Medication Instructions    ascorbic acid,sod/zinc gluc,ox (ZINC AND C ORAL) Take by mouth.    aspirin 81 mg EC tablet 1 tablet, Daily    azelastine (Astelin) 137 mcg (0.1 %) nasal spray 1 spray, Each Nostril, 2 times daily, Use in each nostril as directed    b complex 0.4 mg tablet 1 tablet, Daily    budesonide-formoteroL (Symbicort) 160-4.5 mcg/actuation inhaler 2 puffs,  inhalation, 2 times daily RT, Rinse mouth with water after use to reduce aftertaste and incidence of candidiasis. Do not swallow.    calcium carbonate 600 mg calcium (1,500 mg) tablet 1 tablet, Daily    cholecalciferol (Vitamin D-3) 25 MCG (1000 UT) tablet Take as directed.    denosumab (PROLIA) 60 mg, subcutaneous, Every 6 months    estradiol (Estrace) 0.01 % (0.1 mg/gram) vaginal cream Insert into the vagina. Apply a pea sized amount of vaginal area daily x2 weeks, then three times a week as needed    estradiol (Estrace) 0.01 % (0.1 mg/gram) vaginal cream Discard the applicator and apply a pea sized amount to the vaginal opening and just inside the vagina 3 times/week    fluticasone (Cutivate) 0.05 % cream Topical, As needed, Apply inch.    fluticasone (Flonase) 50 mcg/actuation nasal spray 2 sprays, Daily    MELATONIN ORAL Take by mouth.    MULTIVITAMIN ORAL 1 capsule, Daily    mupirocin (Bactroban) 2 % ointment 3 times daily RT    nitroglycerin (NITROSTAT) 0.4 mg, sublingual, Every 5 min PRN, For up to 3 doses as needed. Call 911 if pain persists.    triamcinolone (Kenalog) 0.1 % cream 1 Application       Inpatient Medications:            Review of Systems   Constitutional: Negative.    HENT: Negative.     Eyes: Negative.    Respiratory: Negative.     Cardiovascular: Negative.    Gastrointestinal: Negative.    Endocrine: Negative.    Genitourinary: Negative.    Musculoskeletal: Negative.    Skin: Negative.    Allergic/Immunologic: Negative.    Neurological: Negative.    Hematological: Negative.    Psychiatric/Behavioral: Negative.            Physical Exam  General:  Patient is awake, alert, and oriented.  Patient is in no acute distress.  HEENT:  Pupils equal and reactive.  Normocephalic.  Moist mucosa.    Neck:  No JVD  Cardiovascular:  Regular rate and rhythm.  Normal S1 and S2. No murmurs/rubs/gallops. Radial pulses 2+.   Pulmonary:  Clear to auscultation bilaterally.  Abdomen:  Soft. Non-tender.    Non-distended.  Positive bowel sounds.  Lower Extremities:  Pedal pulses 2+ No LE edema.  Neurologic:  Cranial nerves II-XII grossly intact.   No focal deficit.   Skin: Skin warm and dry, no lesions. Normal skin turgor.   Psychiatric: Normal affect.        NPO since 000    Last Recorded Vitals  There were no vitals taken for this visit.         Vitals from the Past 24 Hours            Relevant Results    Labs    POCT Glucose:      POCT INR:     POCT Urine Pregnancy:       CBC:   Recent Labs     11/13/24  1144 04/15/24  1107 06/01/23  1033 04/04/23  1507 03/07/23  1626 03/07/22  0827   WBC 6.5 5.8 8.1 6.0 5.2 5.5   HGB 14.3 14.5 15.1 14.7 14.7 15.0   HCT 42.0 44.4 45.1 44.5 43.5 46.2*    180 198 208 189 200    101* 101* 101* 99 104*     BMP/CMP:   Recent Labs     12/16/24  0933 11/13/24  1144 09/18/24  1044 08/14/24  1342 04/15/24  1107 03/05/24  1501 11/14/23  0927 08/25/23  1018 06/01/23  1033 04/04/23  1507   * 137 139  --  137  --  139  --  134* 136   K 4.8 4.5 4.7  --  4.5  --  4.7  --  4.6 4.4   CL 98 101 102  --  100  --  102  --  100 98   BUN 21 34* 33*  --  34*  --  29*  --  23 29*   CREATININE 0.78 0.72 0.78  --  0.73  --  0.68  --  0.66 0.71   CO2 31 25 26  --  28  --  31  --  24 29   CALCIUM 9.1 8.7 9.1  --  9.3 9.4 9.4   < > 9.4 9.5   PROT 6.0* 5.9* 6.1*  --  6.0*  --  6.2*  --   --  6.3*   BILITOT 0.5 0.6 0.6  --  0.6  --  0.6  --   --  0.5   ALKPHOS 62 80 62  --  65  --  60  --   --  65   ALT 18 25 23 24 23  --  22  --   --  20   AST 20 26 22  --  23  --  24  --   --  22   GLUCOSE 95 88 91  --  88  --  77  --  104* 80    < > = values in this interval not displayed.      Magnesium:   Recent Labs     04/04/23  1507 06/22/18  0530 06/21/18  0551 06/20/18  0331 06/19/18  0447 06/17/18  0425   MG 2.24 2.08 2.06 1.96 1.91 1.76     Lipid Panel:   Recent Labs     05/06/24  0836 04/15/24  1107 11/14/23  0927 04/04/23  1507 10/26/22  0924 03/07/22  0827 06/22/18  0530 06/12/18  0708  "04/26/18  0000   CHOL 151 162 169 191 163 223*  --  178 158   HDL 61.3 58.8 65.6 68.0 63.0 68.9  --  68.2 43.5   CHHDL 2.5 2.8 2.6 2.8 2.6 3.2  --  2.6 3.6   VLDL 12 55* 18 26 21 12  --  13 39   TRIG 62 277* 88 132 105 61 82 65 197*   NHDL 90 103 103  --   --   --   --   --   --      Cardiac       No lab exists for component: \"CK\", \"CKMBP\"   Hemoglobin A1C: No results for input(s): \"HGBA1C\" in the last 54527 hours.  TSH/ Free T4:   Recent Labs     11/25/24  1612 04/15/24  1107 03/07/23  1626 02/15/22  1649 04/26/18  0000 02/09/18  1024   TSH 2.32 2.11 2.18 1.74 0.98 1.64     Iron: No results for input(s): \"FERRITIN\", \"TIBC\", \"IRONSAT\", \"BNP\" in the last 81166 hours.  Coag:     ABO: No results found for: \"ABO\"    Past Cardiology Tests (Last 3 Years):    EKG:  Recent Labs     10/25/24  1600 09/17/24  0940 10/13/23  1108   ATRRATE 66 77 72   VENTRATE 66 77 72   PRINT 160 162 142   QRSDUR 82 82 80   QTCFRED 413 421 423   QTCCALCB 419 439 435     Encounter Date: 10/25/24   ECG 12 Lead   Result Value    Ventricular Rate 66    Atrial Rate 66    UT Interval 160    QRS Duration 82    QT Interval 400    QTC Calculation(Bazett) 419    P Axis 40    R Axis 77    T Axis 60    QRS Count 11    Q Onset 223    P Onset 143    P Offset 190    T Offset 423    QTC Fredericia 413    Narrative    Normal sinus rhythm  Normal ECG  When compared with ECG of 17-SEP-2024 09:39, (unconfirmed)  No significant change was found  Confirmed by Emiliano Fitzpatrick ([id number]) on 11/1/2024 2:24:31 PM     Echo:  Echocardiogram: No results found for this or any previous visit from the past 1800 days.    Ejection Fractions:  No results found for: \"EF\"  Cath:  Coronary Angiography: No results found for this or any previous visit from the past 1800 days.    Right Heart Cath: No results found for this or any previous visit from the past 1800 days.    Stress Test:  Nuclear:No results found for this or any previous visit from the past 1800 days.    Metabolic Stress: No " results found for this or any previous visit from the past 1800 days.    Cardiac Imaging:  Cardiac Scoring:   CT HEART CALCIUM SCORING WO IV CONTRAST 05/27/2022    Narrative  MRN: [id number]  Patient Name: CHARLES PABLO    STUDY:  CT CARDIAC SCORING;  5/27/2022 12:55 pm    INDICATION:  recent LDL high, fasting but had a high fat meal the night before  E78.00: Elevated LDL cholesterol level.    COMPARISON:  05/19/2017    ACCESSION NUMBER(S):  [id number]    ORDERING CLINICIAN:  MAHNAZ BERKOWITZ    TECHNIQUE:  Using prospective ECG gating, CT scan of the coronary arteries was  performed without intravenous contrast. Coronary calcium scoring  was  performed according to the method of Agatston.    FINDINGS:  The score and distribution of calcium in the coronary arteries is as  follows:    LM             0  LAD           0  LCx            0  RCA           0    Total         0    The visualized mid/lower ascending thoracic aorta measures 3.5 cm in  diameter. The heart is normal in size. No pericardial effusion is  present.    No gross evidence of mediastinal or hilar lymphadenopathy or masses  is identified. The visualized segments of the lungs are normally  expanded.    The visualized subdiaphragmatic structures appear intact.    Impression  1. Coronary artery calcium score of  0*.    *Coronary artery calcium scoring may be helpful in predicting the  risk for future coronary heart disease events.  According to the  American College of Cardiology Foundation Clinical Expert Consensus  Task Force, such testing provides important prognostic information in  patients with more than one coronary heart disease risk factor. The  coronary artery calcium score correlates with the annual risk of a  non-fatal myocardial infarction or coronary heart disease death.    Coronary artery score            Annual Risk    0-99                               0.4%  100-399                          1.3%  >400                            "   2.4%    These three \"breakpoints\" correspond to lower, intermediate and high  risk states for future coronary events.  Such information should be  used, along with appropriate clinical judgment, to make decisions  regarding the intensity of risk factor management strategies to treat  blood lipids and to modify other non-lipid coronary risk factors.    Reference: [place] P et al. Circulation.  2007; 115:402-426    Cardiac MRI: No results found for this or any previous visit from the past 1800 days.         Assessment/Plan  Assessment/Plan   Assessment & Plan  Atrial fibrillation and flutter    A-fib (Multi)        -ILR explantation and reimplantation with Dr. Fitzpatrick    NP discussed with Dr. Fitzpatrick regarding plan of care/ discharge plan      I spent 30 minutes in the professional and overall care of this patient.      iNcole Ceron, APRN-CNP    "

## 2025-01-31 NOTE — DISCHARGE INSTRUCTIONS
[facility]     Home-going Instructions After Device Implant      Incision: Please keep your incision dry and/or open to air for one week after implant (date: Friday 2/7/25_).    _______Remove white rectangular outer island dressing tomorrow (date: Saturday 2/1/25)    _______ If you have skin glue, please keep your incision dry for 1 week. Do not remove it, as it will peel off by itself.    _______If you have steri strips, please keep your incision dry for 1 week. Steri strips may be removed after one week/will fall off on their own.    _______ If you have an occlusive dressing like Aquacel (tan-colored bandage) or Mepilex border, please leave on for one week. Do not remove the steri strips, they will fall off on their own      Call the Device Clinic at [phone] if you have any questions about your instructions, Monday-Friday between the hours of 7:00 am - 4:30 pm. After hours please call your physician’s office.    After one week, you may wash the site with soap and water. Inspect your incision each day. If you note increased redness, swelling, or drainage, or if you develop a fever, please call your doctor IMMEDIATELY.      Your Doctor: ___________________________________________ Telephone: _____________________________________________      Pain: It is normal for the area around the incision to be tender for a few weeks following surgery. Pain relievers such as Tylenol or Motrin are usually sufficient for pain relief. The pain should get better each day, if it gets worse, please contact your doctor.      Activity Restrictions: device replacement 1 week Friday 2/7/25. Avoid gross arm movement on the side of your new implant. Do not raise or stretch your arm above shoulder level. Do not pick-up weights greater than 15 lbs. Avoid activities such as golf or swimming for six weeks.    Driving: Please do not resume driving for 1 week(s) date: Friday 2/7/25.      ID Card: It is important that you carry  your device ID card with you at all times. Inform all doctors and healthcare providers that you have a pacemaker or defibrillator. Until the lead wires are completely healed in your heart, a prophylactic antibiotic may need to be given to you prior to procedures such as deep cleaning or extraction of teeth.      Electromagnetic Interference: Microwave ovens are safe to use. Cellular telephones should be held to the ear that is opposite your device. If you are scheduled for a MRI, please notify the Device Clinic to check if you have a compatible device. Present your device ID card to the airport . The detector wand may be used below waist level and to inspect your shoes. Read the patient booklet for more information. You may call the device clinic or the patient services department of the device  with questions about specific electrical appliances and interference problems.      It is your responsibility to make and keep appointments. After each visit on your way out, make an appointment for your next visit. Please follow the recommendations of your doctor for the frequency of appointments.      Your Appointment: Date: ______________________________ Time: _______________________________       Device Clinic(s):     [facility] [address]   [address] [address]     [facility] [id number]   [place] [address]     [facility] [address]   [address] [place] [address]    [facility] [address] [place] [address]     [facility]   [address] [place] [address]     [facility] [address]   [address] [place] [address]      [facility]  [address] [place], [place] [address]     [facility] [address] [place] [address]     [facility] [address]  [address]   [address] [place] [address]    [facility]  [address] [place] [address]     [facility]   [address]   [place], [place] [address]     [facility]   [address] [place] [address]    [facility] [address]  [address] [place] [address]    [facility])   [address] [place] [address]     [facility]  [address]  [place], [place] [address]    [facility]  [address], [place] [address]        Device Clinic: [phone] (this is Not an emergency number)  General Appointment Scheduling [facility]: [phone]      MD Office Numbers:    Clemente Ruth [phone]    Emiliano Fitzpatrick [phone]    Alfonso Rdz [phone]    Hue Bruno [phone]    Melinda Issa [phone]    Lindsay Loredo [phone]    Kem Martinez [phone]    Adalberto Lancaster [phone]

## 2025-01-31 NOTE — NURSING NOTE
MD and [employer] rep at bedside; loop explanted using sterile procedure and new loop implanted; device nurse at bedside speaking with patient

## 2025-02-03 ENCOUNTER — HOSPITAL ENCOUNTER (OUTPATIENT)
Dept: CARDIOLOGY | Facility: CLINIC | Age: 75
Discharge: HOME | End: 2025-02-03
Payer: MEDICARE

## 2025-02-03 DIAGNOSIS — I48.19 ATRIAL FIBRILLATION, PERSISTENT (MULTI): ICD-10-CM

## 2025-02-19 DIAGNOSIS — J31.0 RHINITIS, UNSPECIFIED TYPE: Primary | ICD-10-CM

## 2025-02-19 RX ORDER — FLUTICASONE PROPIONATE 50 MCG
2 SPRAY, SUSPENSION (ML) NASAL DAILY
Qty: 16 G | Refills: 0 | Status: SHIPPED | OUTPATIENT
Start: 2025-02-19

## 2025-02-25 DIAGNOSIS — M81.0 OSTEOPOROSIS WITHOUT CURRENT PATHOLOGICAL FRACTURE, UNSPECIFIED OSTEOPOROSIS TYPE: Primary | ICD-10-CM

## 2025-02-27 ENCOUNTER — OFFICE VISIT (OUTPATIENT)
Dept: URGENT CARE | Age: 75
End: 2025-02-27
Payer: MEDICARE

## 2025-02-27 VITALS
TEMPERATURE: 98.1 F | OXYGEN SATURATION: 99 % | HEART RATE: 63 BPM | SYSTOLIC BLOOD PRESSURE: 108 MMHG | DIASTOLIC BLOOD PRESSURE: 55 MMHG

## 2025-02-27 DIAGNOSIS — H60.501 ACUTE OTITIS EXTERNA OF RIGHT EAR, UNSPECIFIED TYPE: Primary | ICD-10-CM

## 2025-02-27 PROCEDURE — 99070 SPECIAL SUPPLIES PHYS/QHP: CPT

## 2025-02-27 PROCEDURE — 1123F ACP DISCUSS/DSCN MKR DOCD: CPT

## 2025-02-27 PROCEDURE — 1159F MED LIST DOCD IN RCRD: CPT

## 2025-02-27 PROCEDURE — 99203 OFFICE O/P NEW LOW 30 MIN: CPT

## 2025-02-27 RX ORDER — OFLOXACIN 3 MG/ML
10 SOLUTION AURICULAR (OTIC) 2 TIMES DAILY
Qty: 0.7 ML | Refills: 0 | Status: SHIPPED | OUTPATIENT
Start: 2025-02-27 | End: 2025-03-06

## 2025-02-27 NOTE — PATIENT INSTRUCTIONS
You were seen at Urgent Care today and diagnosed with an outer ear infection. Please treat as discussed. Please take medications as prescribed. Monitor for red flags which we spoke about, If your symptoms change, worsen or become concerning in any way, please go to the emergency room immediately, otherwise you can followup with your PCP in 2-3 days as needed

## 2025-02-27 NOTE — PROGRESS NOTES
Subjective   Patient ID: Javier Calderón is a 75 y.o. female. They present today with a chief complaint of Earache (Right sided earache x 1 week.).    History of Present Illness  Patient is a 75-year-old female with no relevant past medical history who presents urgent care today with a complaint of right ear pain.  She states her symptoms started 1 week ago.  She denies any trauma or injury.  She also denies any fever, change in hearing, mastoid tenderness, dizziness or any other symptoms.  No other complaints or concerns mention at this time.      History provided by:  Patient  Earache         Past Medical History  Allergies as of 02/27/2025 - Reviewed 02/27/2025   Allergen Reaction Noted    Bee venom protein (honey bee) Unknown 09/09/2023    Pollen extracts Cough 10/09/2024       (Not in a hospital admission)         Past Medical History:   Diagnosis Date    Abnormal finding of lung 09/09/2023    Abnormal levels of other serum enzymes 03/08/2022    Elevated liver enzymes    Abnormal renal function 09/09/2023    Abnormal weight loss 09/27/2018    Weight loss    Aortic thrombus (Multi) 09/09/2023    Dark stools 09/09/2023    Diarrhea, unspecified 12/31/2018    Diarrhea    Disorder of kidney and ureter, unspecified 11/08/2022    Abnormal renal function    Disorder of teeth and supporting structures, unspecified 02/22/2019    Dental disorder    Elevated liver enzymes 09/09/2023    Encounter for general adult medical examination without abnormal findings 03/08/2022    Encounter for Medicare annual wellness exam    Encounter for immunization 03/08/2022    Need for pneumococcal vaccination    Encounter for other screening for malignant neoplasm of breast 02/21/2020    Screening for malignant neoplasm of breast    Encounter for other screening for malignant neoplasm of breast 06/09/2022    Breast screening    Encounter for screening mammogram for malignant neoplasm of breast 02/14/2018    Visit for screening mammogram     Gastro-esophageal reflux disease without esophagitis 06/23/2020    Esophageal reflux    Hypo-osmolality and hyponatremia 09/25/2019    Hyponatremia    Injury of peritoneum, initial encounter 08/23/2019    Peritoneal hematoma    Long term (current) use of anticoagulants 09/28/2018    Chronic anticoagulation    Otalgia, right ear 12/31/2018    Otalgia, right    Otalgia, unspecified ear 12/31/2018    Otalgia    Other abnormality of red blood cells 11/08/2022    Elevated MCV    Other conditions influencing health status 11/22/2021    Oral disorder    Other fatigue 03/19/2020    Fatigue    Other fecal abnormalities 06/29/2018    Dark stools    Other specified abnormal findings of blood chemistry 11/16/2022    Low vitamin D level    Other specified abnormal findings of blood chemistry 02/21/2022    Abnormal liver function test    Other specified diseases of intestine     Small intestinal bacterial overgrowth (SIBO)    Other specified symptoms and signs involving the circulatory and respiratory systems 11/17/2017    Abnormal finding of lung    Personal history of diseases of the blood and blood-forming organs and certain disorders involving the immune mechanism 03/18/2020    History of thrombocytosis    Personal history of diseases of the blood and blood-forming organs and certain disorders involving the immune mechanism 03/18/2020    History of thrombocytosis    Personal history of other specified conditions 12/20/2022    History of nausea    Personal history of other specified conditions 12/20/2022    History of abdominal pain    Pharyngoesophageal dysphagia 09/09/2023    Pulmonary nodule 09/09/2023    Pure hypercholesterolemia, unspecified 11/08/2022    Elevated LDL cholesterol level    Pure hyperglyceridemia 11/16/2022    High triglycerides    Rash and other nonspecific skin eruption 12/30/2020    Rash    Toxic effect of venom of wasps, accidental (unintentional), initial encounter 09/18/2017    Wasp sting     Unspecified abnormal findings in urine 03/04/2022    Urine abnormality    Unspecified menopausal and perimenopausal disorder 01/23/2018    Menopausal and postmenopausal disorder       Past Surgical History:   Procedure Laterality Date    CATARACT EXTRACTION  01/08/2018    Cataract Surgery    CT ABDOMEN PELVIS ANGIOGRAM W AND/OR WO IV CONTRAST  6/12/2018    CT ABDOMEN PELVIS ANGIOGRAM W AND/OR WO IV CONTRAST 6/12/2018 [facility]    CT ABDOMEN PELVIS ANGIOGRAM W AND/OR WO IV CONTRAST  6/14/2018    CT ABDOMEN PELVIS ANGIOGRAM W AND/OR WO IV CONTRAST 6/14/2018 [facility]    IR ANGIOGRAM INFERIOR EPIGASTRIC PELVIC  6/14/2018    IR ANGIOGRAM INFERIOR EPIGASTRIC PELVIC 6/14/2018 [facility]    IR EMBOLIZATION LYMPH NODE Bilateral 6/14/2018    IR EMBOLIZATION LYMPH NODE 6/14/2018 [facility]    IR EMBOLIZATION LYMPH NODE Bilateral 6/14/2018    IR EMBOLIZATION LYMPH NODE 6/14/2018 [facility]    OTHER SURGICAL HISTORY  03/07/2018    Programming And Iterative Adjustment Of Implantable Loop Recorder    OTHER SURGICAL HISTORY  11/08/2022    Esophagogastroduodenoscopy        reports that she has never smoked. She has never used smokeless tobacco. She reports that she does not currently use alcohol. She reports that she does not use drugs.    Review of Systems  Review of Systems   HENT:  Positive for ear pain.                                   Objective    Vitals:    02/27/25 1529   BP: 108/55   BP Location: Left arm   Patient Position: Sitting   BP Cuff Size: Large adult   Pulse: 63   Temp: 36.7 °C (98.1 °F)   TempSrc: Oral   SpO2: 99%     No LMP recorded. Patient is postmenopausal.    Physical Exam  Vitals and nursing note reviewed.   Constitutional:       General: She is not in acute distress.     Appearance: Normal appearance. She is not ill-appearing, toxic-appearing or diaphoretic.   HENT:      Head: Normocephalic and atraumatic.      Right Ear: Tympanic membrane and ear canal normal. There is no impacted  cerumen.      Left Ear: Tympanic membrane, ear canal and external ear normal. There is no impacted cerumen.      Mouth/Throat:      Mouth: Mucous membranes are moist.   Eyes:      Extraocular Movements: Extraocular movements intact.      Conjunctiva/sclera: Conjunctivae normal.      Pupils: Pupils are equal, round, and reactive to light.   Cardiovascular:      Rate and Rhythm: Normal rate and regular rhythm.      Pulses: Normal pulses.      Heart sounds: Normal heart sounds.   Pulmonary:      Effort: Pulmonary effort is normal. No respiratory distress.      Breath sounds: Normal breath sounds. No stridor. No wheezing, rhonchi or rales.   Chest:      Chest wall: No tenderness.   Musculoskeletal:         General: Normal range of motion.      Cervical back: Normal range of motion and neck supple.   Skin:     General: Skin is warm and dry.      Capillary Refill: Capillary refill takes less than 2 seconds.   Neurological:      General: No focal deficit present.      Mental Status: She is alert and oriented to person, place, and time.   Psychiatric:         Mood and Affect: Mood normal.         Behavior: Behavior normal.         Procedures      Assessment/Plan   Allergies, medications, history, and pertinent labs/EKGs/Imaging reviewed by DUNIA Parikh.     Medical Decision Making    Patient is well appearing, afebrile, non toxic, not hypoxic, and appropriate for outpatient treatment and management at time of evaluation. Patient presents with right ear pain x 1 week.     Differential includes but not limited to: Otitis media, otitis externa, TM rupture, other    On exam, TMs are noted to be intact bilaterally and without signs of infection.  Right external auditory canal is erythematous and painful with palpation consistent with otitis externa.  No tenderness with palpation of the mastoid bone.  Patient is afebrile and appears well-hydrated.  Vitals are within normal limits.    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  Recommended staying out of the water for a few days or at least protecting her ears while she is in the water to the best of her ability.  Patient was provided with a prescription for ofloxacin drops to use as directed.  She will follow-up with her PCP in 3 to 4 days if she is not feeling significantly better.  Red flags and ER precautions discussed.  Patient voices understanding and is agreeable this plan.  She was discharged in stable condition.  All questions and concerns addressed.       Dictation software was used in the creation of this note which does not evaluate or correct for typographical, spelling, syntax or grammatical errors.    Orders and Diagnoses  Diagnoses and all orders for this visit:  Acute otitis externa of right ear, unspecified type  -     ofloxacin (Floxin) 0.3 % otic solution; Administer 10 drops into the right ear 2 times a day for 7 days.      Medical Admin Record      Follow Up Instructions  No follow-ups on file.    Patient disposition: Home    Electronically signed by DUNIA Parikh  3:48 PM

## 2025-03-07 ENCOUNTER — APPOINTMENT (OUTPATIENT)
Dept: RHEUMATOLOGY | Facility: CLINIC | Age: 75
End: 2025-03-07
Payer: MEDICARE

## 2025-03-07 VITALS — WEIGHT: 111 LBS | BODY MASS INDEX: 19.05 KG/M2 | DIASTOLIC BLOOD PRESSURE: 68 MMHG | SYSTOLIC BLOOD PRESSURE: 112 MMHG

## 2025-03-07 DIAGNOSIS — M81.0 AGE-RELATED OSTEOPOROSIS WITHOUT CURRENT PATHOLOGICAL FRACTURE: Primary | ICD-10-CM

## 2025-03-07 PROCEDURE — 1123F ACP DISCUSS/DSCN MKR DOCD: CPT | Performed by: INTERNAL MEDICINE

## 2025-03-07 PROCEDURE — 99213 OFFICE O/P EST LOW 20 MIN: CPT | Performed by: INTERNAL MEDICINE

## 2025-03-07 PROCEDURE — 1159F MED LIST DOCD IN RCRD: CPT | Performed by: INTERNAL MEDICINE

## 2025-03-07 NOTE — PROGRESS NOTES
Subjective . Javier Calderón is a 75 y.o. female who presents for Follow-up.    HPI. 75-year-old female with history of osteoporosis, OA, C-spine DJD with foraminal stenosis and A-fib s/p ablation presented for follow-up.     Denies recent fall.  She takes calcium and vitamin D regularly.    Antiresorptive therapy: Denosumab.    Previous antiresorptive therapy: Reclast x 3.    Bone densitometry obtained on February 13, 2024 showed L1-L4 bone density of 1.093 g/cm² reflecting at least 1, right femoral neck bone density of 0.670 g/cm² reflecting a T-score of -2.7 and right total hip bone density of 0.722 g/cm² reflecting a T-score of -2.3.       Review of Systems   All other systems reviewed and are negative.    Objective     Blood pressure 112/68, weight 50.3 kg (111 lb).    Physical Exam.  Gen. AAO x3, NAD.  HEENT: No pallor or icterus, PERRLA, EOMI.  No cervical lymphadenopathy .  Skin: No rashes.  Heart: S1, S2/ RRR.   Lungs: CTA B.  Abdomen: Soft, NT/ND.  MSK: No swollen or tender joint.  Neuro: Sensation to touch intact.Strength 5/5 throughout.   Psych:Appropriate mood and behavior  EXT: No edema    Assessment/Plan .       #1: Postmenopausal osteoporosis.   -She is on Prolia.  Next Prolia injection scheduled on March 27.  -Continue calcium and vitamin D.  -Obtain DEXA scan in February 2026.  -Previous Labs were reviewed and found satisfactory.     This note was partially generated using the Dragon Voice recognition system. There may be some incorrect wording, spelling and/or spelling errors or punctuation errors that were not corrected prior to committing the note to the medical record.    Problem List Items Addressed This Visit    None           Active Ambulatory Problems     Diagnosis Date Noted    Allergic bronchitis ([facility]) 09/09/2023    Allergic reaction to bee sting 09/09/2023    Allergic urticaria 09/09/2023    Arthritis 09/09/2023    A-fib (Multi) 09/09/2023    Atrial flutter (Multi) 09/09/2023     Paroxysmal atrial fibrillation (Multi) 09/09/2023    Elevated LDL cholesterol level 09/09/2023    Elevated MCV 09/09/2023    Esophageal reflux 09/09/2023    Essential (hemorrhagic) thrombocythemia 09/09/2023    Fatigue 09/09/2023    Heart palpitations 09/09/2023    High triglycerides 09/09/2023    Hyponatremia 09/09/2023    Abnormal liver function test 09/09/2023    Low vitamin D level 09/09/2023    Mechanical low back pain 09/09/2023    Menopausal and postmenopausal disorder 09/09/2023    Osteoarthritis, multiple sites 09/09/2023    Osteoporosis without current pathological fracture 09/09/2023    Otalgia, right 09/09/2023    Otalgia 09/09/2023    Peritoneal hematoma 09/09/2023    Presence of cardiac and vascular implant and graft 09/09/2023    Primary localized osteoarthritis of left hip 09/09/2023    Scoliosis 09/09/2023    Segmental and somatic dysfunction of pelvic region 09/09/2023    Urine abnormality 09/09/2023    Vaginal dryness, menopausal 09/09/2023    Ventricular tachycardia (Multi) 09/09/2023    Wasp sting 09/09/2023    Small intestinal bacterial overgrowth (SIBO) 12/19/2023    SHANE (obstructive sleep apnea) 09/25/2024    Dyslipidemia 09/03/2015    Primary osteoarthritis of both first carpometacarpal joints 04/05/2016    Other constipation 11/25/2024    Dietary counseling and surveillance 12/16/2024    Insomnia 01/21/2025     Resolved Ambulatory Problems     Diagnosis Date Noted    Abnormal finding of lung 09/09/2023    Abnormal renal function 09/09/2023    Anal fissure 09/09/2023    Aortic thrombus (Multi) 09/09/2023    Dark stools 09/09/2023    Dental disorder 09/09/2023    Elevated liver enzymes 09/09/2023    Fracture of left patella 09/09/2023    Laceration of left middle finger without foreign body without damage to nail 09/09/2023    Pharyngoesophageal dysphagia 09/09/2023    Pulmonary nodule 09/09/2023    Weight loss 09/09/2023     Past Medical History:   Diagnosis Date    Abnormal levels of other  serum enzymes 03/08/2022    Abnormal weight loss 09/27/2018    Diarrhea, unspecified 12/31/2018    Disorder of kidney and ureter, unspecified 11/08/2022    Disorder of teeth and supporting structures, unspecified 02/22/2019    Encounter for general adult medical examination without abnormal findings 03/08/2022    Encounter for immunization 03/08/2022    Encounter for other screening for malignant neoplasm of breast 02/21/2020    Encounter for other screening for malignant neoplasm of breast 06/09/2022    Encounter for screening mammogram for malignant neoplasm of breast 02/14/2018    Gastro-esophageal reflux disease without esophagitis 06/23/2020    Hypo-osmolality and hyponatremia 09/25/2019    Injury of peritoneum, initial encounter 08/23/2019    Long term (current) use of anticoagulants 09/28/2018    Otalgia, right ear 12/31/2018    Otalgia, unspecified ear 12/31/2018    Other abnormality of red blood cells 11/08/2022    Other conditions influencing health status 11/22/2021    Other fatigue 03/19/2020    Other fecal abnormalities 06/29/2018    Other specified abnormal findings of blood chemistry 11/16/2022    Other specified abnormal findings of blood chemistry 02/21/2022    Other specified diseases of intestine     Other specified symptoms and signs involving the circulatory and respiratory systems 11/17/2017    Personal history of diseases of the blood and blood-forming organs and certain disorders involving the immune mechanism 03/18/2020    Personal history of diseases of the blood and blood-forming organs and certain disorders involving the immune mechanism 03/18/2020    Personal history of other specified conditions 12/20/2022    Personal history of other specified conditions 12/20/2022    Pure hypercholesterolemia, unspecified 11/08/2022    Pure hyperglyceridemia 11/16/2022    Rash and other nonspecific skin eruption 12/30/2020    Toxic effect of venom of wasps, accidental (unintentional), initial  encounter 09/18/2017    Unspecified abnormal findings in urine 03/04/2022    Unspecified menopausal and perimenopausal disorder 01/23/2018       Family History   Problem Relation Name Age of Onset    Coronary artery disease Mother      Atrial fibrillation Mother      Heart failure Mother      Hypertension Mother      Coronary artery disease Father      Other (CVA) Father      Heart failure Father      Hypertension Father      Other (Mitral valve replacement) Father      Sleep apnea Father      Sleep apnea Sister      Polymyalgia rheumatica Brother      Sleep apnea Brother      No Known Problems Son Akin     Coronary artery disease Other Multiple Family Members     Other (Diabetes mellitus) Other Family History     Lung cancer Other Family History     Brain cancer Other Family History     Pancreatic cancer Other Family History     Throat cancer Other Family History        Past Surgical History:   Procedure Laterality Date    CATARACT EXTRACTION  01/08/2018    Cataract Surgery    CT ABDOMEN PELVIS ANGIOGRAM W AND/OR WO IV CONTRAST  6/12/2018    CT ABDOMEN PELVIS ANGIOGRAM W AND/OR WO IV CONTRAST 6/12/2018 [facility]    CT ABDOMEN PELVIS ANGIOGRAM W AND/OR WO IV CONTRAST  6/14/2018    CT ABDOMEN PELVIS ANGIOGRAM W AND/OR WO IV CONTRAST 6/14/2018 [facility]    IR ANGIOGRAM INFERIOR EPIGASTRIC PELVIC  6/14/2018    IR ANGIOGRAM INFERIOR EPIGASTRIC PELVIC 6/14/2018 [facility]    IR EMBOLIZATION LYMPH NODE Bilateral 6/14/2018    IR EMBOLIZATION LYMPH NODE 6/14/2018 [facility]    IR EMBOLIZATION LYMPH NODE Bilateral 6/14/2018    IR EMBOLIZATION LYMPH NODE 6/14/2018 [facility]    OTHER SURGICAL HISTORY  03/07/2018    Programming And Iterative Adjustment Of Implantable Loop Recorder    OTHER SURGICAL HISTORY  11/08/2022    Esophagogastroduodenoscopy       Social History     Tobacco Use   Smoking Status Never   Smokeless Tobacco Never       Allergies  Bee venom protein (honey bee) and Pollen  extracts    Current Meds  Current Outpatient Medications   Medication Instructions    ascorbic acid,sod/zinc gluc,ox (ZINC AND C ORAL) Take by mouth.    aspirin 81 mg EC tablet 1 tablet, Daily    azelastine (Astelin) 137 mcg (0.1 %) nasal spray 1 spray, Each Nostril, 2 times daily, Use in each nostril as directed    b complex 0.4 mg tablet 1 tablet, Daily    budesonide-formoteroL (Symbicort) 160-4.5 mcg/actuation inhaler 2 puffs, inhalation, 2 times daily RT, Rinse mouth with water after use to reduce aftertaste and incidence of candidiasis. Do not swallow.    calcium carbonate 600 mg calcium (1,500 mg) tablet 1 tablet, Daily    cholecalciferol (Vitamin D-3) 25 MCG (1000 UT) tablet Take as directed.    denosumab (PROLIA) 60 mg, subcutaneous, Every 6 months    estradiol (Estrace) 0.01 % (0.1 mg/gram) vaginal cream Insert into the vagina. Apply a pea sized amount of vaginal area daily x2 weeks, then three times a week as needed    estradiol (Estrace) 0.01 % (0.1 mg/gram) vaginal cream Discard the applicator and apply a pea sized amount to the vaginal opening and just inside the vagina 3 times/week    fluticasone (Cutivate) 0.05 % cream Topical, As needed, Apply inch.    fluticasone (Flonase) 50 mcg/actuation nasal spray 2 sprays, Each Nostril, Daily    MELATONIN ORAL Take by mouth.    MULTIVITAMIN ORAL 1 capsule, Daily    mupirocin (Bactroban) 2 % ointment 3 times daily RT    nitroglycerin (NITROSTAT) 0.4 mg, sublingual, Every 5 min PRN, For up to 3 doses as needed. Call 911 if pain persists.    triamcinolone (Kenalog) 0.1 % cream 1 Application        Lab Results   Component Value Date    SEDRATE 4 11/14/2023    CRP <0.10 11/14/2023    DNADS <1.0 12/05/2017             Andrew Rodgers MD

## 2025-03-08 ENCOUNTER — PATIENT MESSAGE (OUTPATIENT)
Dept: PRIMARY CARE | Facility: CLINIC | Age: 75
End: 2025-03-08
Payer: MEDICARE

## 2025-03-08 DIAGNOSIS — E55.9 VITAMIN D DEFICIENCY: Primary | ICD-10-CM

## 2025-03-08 DIAGNOSIS — E78.00 HYPERCHOLESTEROLEMIA: ICD-10-CM

## 2025-03-08 LAB
ALBUMIN SERPL-MCNC: 4.4 G/DL (ref 3.6–5.1)
ALBUMIN/GLOB SERPL: 2.6 (CALC) (ref 1–2.5)
ALP SERPL-CCNC: 74 U/L (ref 37–153)
ALT SERPL-CCNC: 18 U/L (ref 6–29)
AST SERPL-CCNC: 22 U/L (ref 10–35)
BILIRUB SERPL-MCNC: 0.6 MG/DL (ref 0.2–1.2)
BUN SERPL-MCNC: 20 MG/DL (ref 7–25)
BUN/CREAT SERPL: ABNORMAL (CALC) (ref 6–22)
CALCIUM SERPL-MCNC: 8.7 MG/DL (ref 8.6–10.4)
CHLORIDE SERPL-SCNC: 97 MMOL/L (ref 98–110)
CO2 SERPL-SCNC: 28 MMOL/L (ref 20–32)
CREAT SERPL-MCNC: 0.65 MG/DL (ref 0.6–1)
EGFRCR SERPLBLD CKD-EPI 2021: 92 ML/MIN/1.73M2
GLOBULIN SER CALC-MCNC: 1.7 G/DL (CALC) (ref 1.9–3.7)
GLUCOSE SERPL-MCNC: 118 MG/DL (ref 65–99)
POTASSIUM SERPL-SCNC: 4.4 MMOL/L (ref 3.5–5.3)
PROT SERPL-MCNC: 6.1 G/DL (ref 6.1–8.1)
SODIUM SERPL-SCNC: 135 MMOL/L (ref 135–146)

## 2025-03-10 DIAGNOSIS — M81.0 OSTEOPOROSIS, UNSPECIFIED OSTEOPOROSIS TYPE, UNSPECIFIED PATHOLOGICAL FRACTURE PRESENCE: Primary | ICD-10-CM

## 2025-03-24 DIAGNOSIS — J31.0 RHINITIS, UNSPECIFIED TYPE: ICD-10-CM

## 2025-03-24 RX ORDER — FLUTICASONE PROPIONATE 50 MCG
2 SPRAY, SUSPENSION (ML) NASAL DAILY
Qty: 48 G | Refills: 0 | Status: SHIPPED | OUTPATIENT
Start: 2025-03-24

## 2025-03-27 ENCOUNTER — APPOINTMENT (OUTPATIENT)
Dept: INFUSION THERAPY | Facility: CLINIC | Age: 75
End: 2025-03-27
Payer: MEDICARE

## 2025-03-27 ENCOUNTER — HOSPITAL ENCOUNTER (OUTPATIENT)
Dept: CARDIOLOGY | Facility: CLINIC | Age: 75
Discharge: HOME | End: 2025-03-27
Payer: MEDICARE

## 2025-03-27 VITALS
TEMPERATURE: 97.2 F | DIASTOLIC BLOOD PRESSURE: 64 MMHG | OXYGEN SATURATION: 95 % | RESPIRATION RATE: 16 BRPM | SYSTOLIC BLOOD PRESSURE: 108 MMHG | HEART RATE: 71 BPM

## 2025-03-27 DIAGNOSIS — I48.19 PERSISTENT ATRIAL FIBRILLATION (MULTI): ICD-10-CM

## 2025-03-27 DIAGNOSIS — M81.0 OSTEOPOROSIS WITHOUT CURRENT PATHOLOGICAL FRACTURE, UNSPECIFIED OSTEOPOROSIS TYPE: ICD-10-CM

## 2025-03-27 DIAGNOSIS — N95.1 VAGINAL DRYNESS, MENOPAUSAL: ICD-10-CM

## 2025-03-27 PROCEDURE — 93298 REM INTERROG DEV EVAL SCRMS: CPT | Performed by: INTERNAL MEDICINE

## 2025-03-27 PROCEDURE — 93298 REM INTERROG DEV EVAL SCRMS: CPT

## 2025-03-27 PROCEDURE — 96372 THER/PROPH/DIAG INJ SC/IM: CPT | Performed by: NURSE PRACTITIONER

## 2025-03-27 RX ORDER — DIPHENHYDRAMINE HYDROCHLORIDE 50 MG/ML
50 INJECTION, SOLUTION INTRAMUSCULAR; INTRAVENOUS AS NEEDED
OUTPATIENT
Start: 2025-09-13

## 2025-03-27 RX ORDER — EPINEPHRINE 0.3 MG/.3ML
0.3 INJECTION SUBCUTANEOUS EVERY 5 MIN PRN
OUTPATIENT
Start: 2025-09-13

## 2025-03-27 RX ORDER — ALBUTEROL SULFATE 0.83 MG/ML
3 SOLUTION RESPIRATORY (INHALATION) AS NEEDED
OUTPATIENT
Start: 2025-09-13

## 2025-03-27 RX ORDER — FAMOTIDINE 10 MG/ML
20 INJECTION, SOLUTION INTRAVENOUS ONCE AS NEEDED
OUTPATIENT
Start: 2025-09-13

## 2025-03-27 ASSESSMENT — ENCOUNTER SYMPTOMS
LEG SWELLING: 0
LIGHT-HEADEDNESS: 0
NUMBNESS: 0
WOUND: 0
SHORTNESS OF BREATH: 0
DIZZINESS: 0
EXTREMITY WEAKNESS: 0
PALPITATIONS: 0
WHEEZING: 0
COUGH: 0

## 2025-03-27 NOTE — PROGRESS NOTES
[facility]   Infusion Clinic Note   Date: 2025   Name: Javier Calderón  : 1950   MRN: [id number]         Reason for Visit: Injections (Every 6 months Prolia 60mg subcutaneous injection)         Today: We administered denosumab.       Ordered By: Andrew Rodgers MD       For a Diagnosis of: Osteoporosis without current pathological fracture, unspecified osteoporosis type       At today's visit patient accompanied by: Self      Today's Vitals:   Vitals:    25 0957   BP: 108/64   Pulse: 71   Resp: 16   Temp: 36.2 °C (97.2 °F)   TempSrc: Temporal   SpO2: 95%             Pre - Treatment Checklist:      - Previous reaction to current treatment: no      (Assess patient for the concerns below. Document provider notification as appropriate).  - Active or recent infection with/without current antibiotic use: no  - Recent or planned invasive dental work: no  - Recent or planned surgeries: no  - Recently received or plans to receive vaccinations: no  - Has treatment related toxicities: no  - Any chance may be pregnant:  no      Pain: 0   - Is the pain different from normal: n/a   - Is prescribing Doctor aware:  n/a      Labs: Reviewed       Fall Risk Screening: Romero Fall Risk  History of Falling, Immediate or Within 3 Months: No  Secondary Diagnosis: Yes  Ambulatory Aid: Walks without aid/bedrest/nurse assist  Intravenous Therapy/Heparin Lock: No  Gait/Transferring: Normal/bedrest/immobile  Mental Status: Oriented to own ability  Romero Fall Risk Score: 15       Review Of Systems:  Review of Systems   Respiratory:  Negative for cough, shortness of breath and wheezing.    Cardiovascular:  Negative for chest pain, leg swelling and palpitations.   Skin:  Negative for itching, rash and wound.   Neurological:  Negative for dizziness, extremity weakness, light-headedness and numbness.         Infusion Readiness:  - Assessment Concerns Related to Infusion: No  - Provider notified: n/a       New Patient Education:    N/A (returning patient for continuation of therapy. Ongoing education provided as needed.)        Treatment Conditions & Drug Specific Questions:    Denosumab  (PROLIA. XGEVA)    (Unless otherwise specified on patient specific therapy plan):     TREATMENT CONDITIONS:  Unless otherwise specified on patient specific therapy plan HOLD and notify provider prior to proceeding with today's injection if patients:  O Corrected or Serum Calcium LESS THAN 8.6 mg/dL  OR Ionized calcium less than 1.1 mmol/L or  less than 4.7 mg/dL (depending on resulting agency)  o Recent or planned invasive dental procedure (within 4 weeks)    Lab Results   Component Value Date    CALCIUM 8.7 03/07/2025    PHOS 3.6 04/04/2023      Lab Results   Component Value Date    CAION 1.17 06/22/2018     Patient meets treatment conditions? Yes    DRUG SPECIFIC QUESTIONS:  Is the patient taking calcium and vitamin D? Yes  (Recommended)    Pt Instructed on following risks: (1) hypocalcemia, (2) osteonecrosis of the jaw, (3) atypical femoral fractures, (4) serious infections, and (5) dermatologic reactions?  Yes      REMINDER:  PREGNANCY CATEGORY X DRUG. OBTAIN NEGTATIVE PREGNANCY TEST PRIOR TO FIRST INFUSION FOR WOMEN OF CHILDBEARING ABILITY   REMS DRUG    Recommended Vitals/Observation:  Vitals: Obtain vitals prior to injection.  Observation: Patient may leave immediately following injection.        Weight Based Drug Calculations:    WEIGHT BASED DRUGS: NOT APPLICABLE / FLAT DOSE       Post Treatment: Patient tolerated treatment without issue and was discharged in no apparent distress.      Note Authored / Patient Cared for By: Fe Fields LPN

## 2025-03-27 NOTE — PATIENT INSTRUCTIONS
Today :We administered denosumab. Prolia 60mg subcutaneous injection right upper arm  Blood Calcium within 28 days of next Prolia appointment    For:   1. Osteoporosis without current pathological fracture, unspecified osteoporosis type       Your next appointment is due in:  6 months        Please read the  Medication Guide that was given to you and reviewed during todays visit.     (Tell all doctors including dentists that you are taking this medication)     Go to the emergency room or call 911 if:  -You have signs of allergic reaction:   -Rash, hives, itching.   -Swollen, blistered, peeling skin.   -Swelling of face, lips, mouth, tongue or throat.   -Tightness of chest, trouble breathing, swallowing or talking     Call your doctor:  - If injection site gets red, warm, swollen, itchy or leaks fluid or pus.     (Leave band aid on your injection site for at least 2 hours and keep area clean and dry)  - If you get sick or have symptoms of infection or are not feeling well for any reason.    (Wash your hands often, stay away from people who are sick)  - If you have side effects from your medication that do not go away or are bothersome.     (Refer to the teaching your nurse gave you for side effects to call your doctor about)    - Common side effects may include:  stuffy nose, headache, feeling tired, muscle aches, upset stomach  - Before receiving any vaccines     - Call the [facility] at   If:  - You get sick, are on antibiotics, have had a recent vaccine, have surgery or dental work and your doctor wants your visit rescheduled.  - You need to cancel and reschedule your visit for any reason. Call at least 2 days before your visit if you need to cancel.   - Your insurance changes before your next visit.    (We will need to get approval from your new insurance. This can take up to two weeks.)     The [facility] is opened Monday thru Friday. We are closed on weekends and holidays.    Voice mail will take your call if the center is closed. If you leave a message please allow 24 hours for a call back during weekdays. If you leave a message on a weekend/holiday, we will call you back the next business day.    A pharmacist is available Monday - Friday from 8:30AM to 3:30PM to help answer any questions you may have about your prescriptions(s). Please call pharmacy at:    [facility]: [phone]  [facility]: [phone]  [facility]: [phone]

## 2025-03-28 ENCOUNTER — HOSPITAL ENCOUNTER (OUTPATIENT)
Dept: CARDIOLOGY | Facility: CLINIC | Age: 75
Discharge: HOME | End: 2025-03-28
Payer: MEDICARE

## 2025-03-28 DIAGNOSIS — I48.19 PERSISTENT ATRIAL FIBRILLATION (MULTI): ICD-10-CM

## 2025-03-28 PROCEDURE — 93298 REM INTERROG DEV EVAL SCRMS: CPT

## 2025-03-30 RX ORDER — ESTRADIOL 0.1 MG/G
CREAM VAGINAL
Qty: 42.5 G | Refills: 3 | Status: SHIPPED | OUTPATIENT
Start: 2025-03-30

## 2025-04-01 ENCOUNTER — CLINICAL SUPPORT (OUTPATIENT)
Dept: URGENT CARE | Age: 75
End: 2025-04-01
Payer: MEDICARE

## 2025-04-02 ENCOUNTER — TELEMEDICINE (OUTPATIENT)
Dept: PRIMARY CARE | Facility: CLINIC | Age: 75
End: 2025-04-02
Payer: MEDICARE

## 2025-04-02 DIAGNOSIS — R19.7 DIARRHEA, UNSPECIFIED TYPE: Primary | ICD-10-CM

## 2025-04-02 PROCEDURE — 99213 OFFICE O/P EST LOW 20 MIN: CPT | Performed by: INTERNAL MEDICINE

## 2025-04-02 PROCEDURE — 1123F ACP DISCUSS/DSCN MKR DOCD: CPT | Performed by: INTERNAL MEDICINE

## 2025-04-02 ASSESSMENT — ENCOUNTER SYMPTOMS: DIARRHEA: 1

## 2025-04-02 NOTE — PROGRESS NOTES
Subjective   Patient ID: Javier Calderón is a 75 y.o. female who presents for No chief complaint on file..    Diarrhea         I performed this visit using real-time telehealth tools, including an audio/video connection between Javier Calderón and myself Dr Fox in office [facility], [place], [place]  Patient on with me on a virtual visit  3 week runny watery stools 4 times during the day thought it was her sibo past 4 days cramping getting up during the night   So started Pepto and imod which stopped the stools but still with the cramping . Food not making it worse able to swim   No travel no ill contacts   No blood in the stool , no fever no well water no camping   Review of Systems   Gastrointestinal:  Positive for diarrhea.   General: see hpi  Ears, Nose, Throat : see hpi  Dermatologic: Negative for new skin conditions, rash  Respiratory: see hpi  Gastrointestinal: Negative for nausea/vomiting see hpi   Neurological: Negative for dizziness     Previous history  Past Medical History:   Diagnosis Date    Abnormal finding of lung 09/09/2023    Abnormal levels of other serum enzymes 03/08/2022    Elevated liver enzymes    Abnormal renal function 09/09/2023    Abnormal weight loss 09/27/2018    Weight loss    Aortic thrombus (Multi) 09/09/2023    Dark stools 09/09/2023    Diarrhea, unspecified 12/31/2018    Diarrhea    Disorder of kidney and ureter, unspecified 11/08/2022    Abnormal renal function    Disorder of teeth and supporting structures, unspecified 02/22/2019    Dental disorder    Elevated liver enzymes 09/09/2023    Encounter for general adult medical examination without abnormal findings 03/08/2022    Encounter for Medicare annual wellness exam    Encounter for immunization 03/08/2022    Need for pneumococcal vaccination    Encounter for other screening for malignant neoplasm of breast 02/21/2020    Screening for malignant neoplasm of breast    Encounter for other screening for  malignant neoplasm of breast 06/09/2022    Breast screening    Encounter for screening mammogram for malignant neoplasm of breast 02/14/2018    Visit for screening mammogram    Gastro-esophageal reflux disease without esophagitis 06/23/2020    Esophageal reflux    Hypo-osmolality and hyponatremia 09/25/2019    Hyponatremia    Injury of peritoneum, initial encounter 08/23/2019    Peritoneal hematoma    Long term (current) use of anticoagulants 09/28/2018    Chronic anticoagulation    Otalgia, right ear 12/31/2018    Otalgia, right    Otalgia, unspecified ear 12/31/2018    Otalgia    Other abnormality of red blood cells 11/08/2022    Elevated MCV    Other conditions influencing health status 11/22/2021    Oral disorder    Other fatigue 03/19/2020    Fatigue    Other fecal abnormalities 06/29/2018    Dark stools    Other specified abnormal findings of blood chemistry 11/16/2022    Low vitamin D level    Other specified abnormal findings of blood chemistry 02/21/2022    Abnormal liver function test    Other specified diseases of intestine     Small intestinal bacterial overgrowth (SIBO)    Other specified symptoms and signs involving the circulatory and respiratory systems 11/17/2017    Abnormal finding of lung    Personal history of diseases of the blood and blood-forming organs and certain disorders involving the immune mechanism 03/18/2020    History of thrombocytosis    Personal history of diseases of the blood and blood-forming organs and certain disorders involving the immune mechanism 03/18/2020    History of thrombocytosis    Personal history of other specified conditions 12/20/2022    History of nausea    Personal history of other specified conditions 12/20/2022    History of abdominal pain    Pharyngoesophageal dysphagia 09/09/2023    Pulmonary nodule 09/09/2023    Pure hypercholesterolemia, unspecified 11/08/2022    Elevated LDL cholesterol level    Pure hyperglyceridemia 11/16/2022    High triglycerides     Rash and other nonspecific skin eruption 12/30/2020    Rash    Toxic effect of venom of wasps, accidental (unintentional), initial encounter 09/18/2017    Wasp sting    Unspecified abnormal findings in urine 03/04/2022    Urine abnormality    Unspecified menopausal and perimenopausal disorder 01/23/2018    Menopausal and postmenopausal disorder     Past Surgical History:   Procedure Laterality Date    CATARACT EXTRACTION  01/08/2018    Cataract Surgery    CT ABDOMEN PELVIS ANGIOGRAM W AND/OR WO IV CONTRAST  6/12/2018    CT ABDOMEN PELVIS ANGIOGRAM W AND/OR WO IV CONTRAST 6/12/2018 [facility]    CT ABDOMEN PELVIS ANGIOGRAM W AND/OR WO IV CONTRAST  6/14/2018    CT ABDOMEN PELVIS ANGIOGRAM W AND/OR WO IV CONTRAST 6/14/2018 [facility]    IR ANGIOGRAM INFERIOR EPIGASTRIC PELVIC  6/14/2018    IR ANGIOGRAM INFERIOR EPIGASTRIC PELVIC 6/14/2018 [facility]    IR EMBOLIZATION LYMPH NODE Bilateral 6/14/2018    IR EMBOLIZATION LYMPH NODE 6/14/2018 [facility]    IR EMBOLIZATION LYMPH NODE Bilateral 6/14/2018    IR EMBOLIZATION LYMPH NODE 6/14/2018 [facility]    OTHER SURGICAL HISTORY  03/07/2018    Programming And Iterative Adjustment Of Implantable Loop Recorder    OTHER SURGICAL HISTORY  11/08/2022    Esophagogastroduodenoscopy     Social History     Tobacco Use    Smoking status: Never    Smokeless tobacco: Never   Vaping Use    Vaping status: Never Used   Substance Use Topics    Alcohol use: Not Currently    Drug use: Never     Family History   Problem Relation Name Age of Onset    Coronary artery disease Mother      Atrial fibrillation Mother      Heart failure Mother      Hypertension Mother      Coronary artery disease Father      Other (CVA) Father      Heart failure Father      Hypertension Father      Other (Mitral valve replacement) Father      Sleep apnea Father      Sleep apnea Sister      Polymyalgia rheumatica Brother      Sleep apnea Brother      No Known Problems Son Akin     Coronary artery  disease Other Multiple Family Members     Other (Diabetes mellitus) Other Family History     Lung cancer Other Family History     Brain cancer Other Family History     Pancreatic cancer Other Family History     Throat cancer Other Family History      Allergies   Allergen Reactions    Bee Venom Protein (Honey Bee) Unknown    Pollen Extracts Cough     Current Outpatient Medications   Medication Instructions    ascorbic acid,sod/zinc gluc,ox (ZINC AND C ORAL) Take by mouth.    aspirin 81 mg EC tablet 1 tablet, Daily    azelastine (Astelin) 137 mcg (0.1 %) nasal spray 1 spray, Each Nostril, 2 times daily, Use in each nostril as directed    b complex 0.4 mg tablet 1 tablet, Daily    budesonide-formoteroL (Symbicort) 160-4.5 mcg/actuation inhaler 2 puffs, inhalation, 2 times daily RT, Rinse mouth with water after use to reduce aftertaste and incidence of candidiasis. Do not swallow.    calcium carbonate 600 mg calcium (1,500 mg) tablet 1 tablet, Daily    cholecalciferol (Vitamin D-3) 25 MCG (1000 UT) tablet Take as directed.    denosumab (PROLIA) 60 mg, subcutaneous, Every 6 months    estradiol (Estrace) 0.01 % (0.1 mg/gram) vaginal cream Discard the applicator and apply a pea sized amount to the vaginal opening and just inside the vagina 3 times/week    estradiol (Estrace) 0.01 % (0.1 mg/gram) vaginal cream Apply a blueberry sized amount around the vaginal opening and just inside the vagina three times a week    fluticasone (Cutivate) 0.05 % cream Topical, As needed, Apply inch.    fluticasone (Flonase) 50 mcg/actuation nasal spray 2 sprays, Each Nostril, Daily    MELATONIN ORAL Take by mouth.    MULTIVITAMIN ORAL 1 capsule, Daily    mupirocin (Bactroban) 2 % ointment 3 times daily RT    nitroglycerin (NITROSTAT) 0.4 mg, sublingual, Every 5 min PRN, For up to 3 doses as needed. Call 911 if pain persists.    triamcinolone (Kenalog) 0.1 % cream 1 Application       Objective       Physical Exam  via EPIC  Telehealth  General: Well groomed, well nourished , speaks full sentences  Alert Cooperative , no apparent distress   Skin: Good color does not appear dehydrated   Eyes: Extra ocular muscle movements intact, anicteric sclerae  Neck: Supple, with good range of motion looking behind her and moving head sideways to cough   Neurological: Alert, oriented        Assessment/Plan   Javier Calderón is a 75 y.o. female who presents for the concerns below:    Problem List Items Addressed This Visit    None    DIARRHEA  PLAN: 3 weeks ongoing continue  supportive measures, hydration, electrolyte replacement, , Pedialyte, BRAT diet, bowel rest, slow reintroduction of food, since diarrhea persists 3 weeks now will send stools for culture SSYC, O&;P, C Diff.toxin  , hold off on Imodium and the pepto bismol   infection precautions , sanitize objects shared with family i.e doorknobs, light switches, refrigerator doors, microwave handles. faucets , etc     If cultures are negative and symptoms persist will consider imaging studies and  consult GI, then should hold off on vitamin supplements if any  until fully recovered.  If worse let me know but if with worsening abdominal pain,   signs of dehydration extreme pain , proceed to ED.  Time Spent  with patient:  7  minutes of which greater than 50 percent was spent counselling and coordinating care.         Discussed with:   Return in :    Portions of this note were generated using digital voice recognition software, and may contain grammatical errors       Andrew Ayoub MD  04/02/25  12:28 PM

## 2025-04-03 ENCOUNTER — OFFICE VISIT (OUTPATIENT)
Dept: GASTROENTEROLOGY | Facility: CLINIC | Age: 75
End: 2025-04-03
Payer: MEDICARE

## 2025-04-03 DIAGNOSIS — R19.7 DIARRHEA, UNSPECIFIED TYPE: Primary | ICD-10-CM

## 2025-04-03 PROCEDURE — 99213 OFFICE O/P EST LOW 20 MIN: CPT | Performed by: INTERNAL MEDICINE

## 2025-04-03 PROCEDURE — 1123F ACP DISCUSS/DSCN MKR DOCD: CPT | Performed by: INTERNAL MEDICINE

## 2025-04-03 PROCEDURE — 1159F MED LIST DOCD IN RCRD: CPT | Performed by: INTERNAL MEDICINE

## 2025-04-03 PROCEDURE — 1160F RVW MEDS BY RX/DR IN RCRD: CPT | Performed by: INTERNAL MEDICINE

## 2025-04-03 NOTE — PROGRESS NOTES
"Subjective     History of Present Illness:   Javier Caledrón is a 75 y.o. female who presents to GI clinic for about four weeks ago started getting constipated. Stools had been 'ushy\". Then constipated and started taking Miralax. Then started daily watery BM and for the last few days crampy pain especially at night. Having BM initially daily now up to three to four times a day.  Has had cramping to the point of diaphoresis.  Took Pepto and Imodium and went on BRAT diet.  BM have slowed some with the Imodium.    No recent antibiotics.    Brought in stool sample this AM.  Review of Systems  Review of Systems    Social History   reports that she has never smoked. She has never used smokeless tobacco. She reports that she does not currently use alcohol. She reports that she does not use drugs.     Allergies  Allergies   Allergen Reactions    Bee Venom Protein (Honey Bee) Unknown    Pollen Extracts Cough       Medications  Current Outpatient Medications   Medication Instructions    ascorbic acid,sod/zinc gluc,ox (ZINC AND C ORAL) Take by mouth.    aspirin 81 mg EC tablet 1 tablet, Daily    azelastine (Astelin) 137 mcg (0.1 %) nasal spray 1 spray, Each Nostril, 2 times daily, Use in each nostril as directed    b complex 0.4 mg tablet 1 tablet, Daily    budesonide-formoteroL (Symbicort) 160-4.5 mcg/actuation inhaler 2 puffs, inhalation, 2 times daily RT, Rinse mouth with water after use to reduce aftertaste and incidence of candidiasis. Do not swallow.    calcium carbonate 600 mg calcium (1,500 mg) tablet 1 tablet, Daily    cholecalciferol (Vitamin D-3) 25 MCG (1000 UT) tablet Take as directed.    denosumab (PROLIA) 60 mg, subcutaneous, Every 6 months    estradiol (Estrace) 0.01 % (0.1 mg/gram) vaginal cream Discard the applicator and apply a pea sized amount to the vaginal opening and just inside the vagina 3 times/week    estradiol (Estrace) 0.01 % (0.1 mg/gram) vaginal cream Apply a blueberry sized amount around the " vaginal opening and just inside the vagina three times a week    fluticasone (Cutivate) 0.05 % cream Topical, As needed, Apply inch.    fluticasone (Flonase) 50 mcg/actuation nasal spray 2 sprays, Each Nostril, Daily    MELATONIN ORAL Take by mouth.    MULTIVITAMIN ORAL 1 capsule, Daily    mupirocin (Bactroban) 2 % ointment 3 times daily RT    nitroglycerin (NITROSTAT) 0.4 mg, sublingual, Every 5 min PRN, For up to 3 doses as needed. Call 911 if pain persists.    triamcinolone (Kenalog) 0.1 % cream 1 Application        Objective   There were no vitals taken for this visit.   Physical Exam  Constitutional:       Appearance: Normal appearance.   HENT:      Mouth/Throat:      Mouth: Mucous membranes are moist.      Pharynx: Oropharynx is clear.   Eyes:      Extraocular Movements: Extraocular movements intact.      Pupils: Pupils are equal, round, and reactive to light.   Cardiovascular:      Rate and Rhythm: Normal rate and regular rhythm.      Heart sounds: No murmur heard.  Pulmonary:      Effort: Pulmonary effort is normal.      Breath sounds: Normal breath sounds.   Abdominal:      General: Abdomen is flat. Bowel sounds are normal.      Palpations: Abdomen is soft. There is no mass.      Tenderness: There is abdominal tenderness (mild diffuse tenderness).   Skin:     General: Skin is warm and dry.   Neurological:      Mental Status: She is alert.   Psychiatric:         Mood and Affect: Mood normal.         Behavior: Behavior normal.         Thought Content: Thought content normal.         Judgment: Judgment normal.                       Assessment/Plan   Javier Calderón is a 75 y.o. female who presents to GI clinic for several week period of diarrhea and more abdominal pain.  Different from previous times that have been related to her diagnosis of SIBO which is always in the past promptly responded to intermittent courses of doxycycline.  Generally has just had bloating and abdominal discomfort.  Told her  certainly still a possibility.  Also could have an superimposed or different infectious enteritis.  Has been feeling very tired, depleted.  Possible dehydration although not clinically.    Plan    Stool studies pending for infectious pathogens  CBC with differential, CMP  Discussed advancing diet.  Maintaining hydration.  May empirically treat for SIBO if the above are unrevealing and symptoms fail to resolve..      [place] MD

## 2025-04-04 LAB
ALBUMIN SERPL-MCNC: 4.3 G/DL (ref 3.6–5.1)
ALP SERPL-CCNC: 65 U/L (ref 37–153)
ALT SERPL-CCNC: 21 U/L (ref 6–29)
ANION GAP SERPL CALCULATED.4IONS-SCNC: 8 MMOL/L (CALC) (ref 7–17)
AST SERPL-CCNC: 25 U/L (ref 10–35)
BASOPHILS # BLD AUTO: 43 CELLS/UL (ref 0–200)
BASOPHILS NFR BLD AUTO: 0.7 %
BILIRUB SERPL-MCNC: 0.4 MG/DL (ref 0.2–1.2)
BUN SERPL-MCNC: 19 MG/DL (ref 7–25)
CALCIUM SERPL-MCNC: 8.9 MG/DL (ref 8.6–10.4)
CHLORIDE SERPL-SCNC: 99 MMOL/L (ref 98–110)
CO2 SERPL-SCNC: 29 MMOL/L (ref 20–32)
CREAT SERPL-MCNC: 0.67 MG/DL (ref 0.6–1)
EGFRCR SERPLBLD CKD-EPI 2021: 91 ML/MIN/1.73M2
EOSINOPHIL # BLD AUTO: 61 CELLS/UL (ref 15–500)
EOSINOPHIL NFR BLD AUTO: 1 %
ERYTHROCYTE [DISTWIDTH] IN BLOOD BY AUTOMATED COUNT: 13 % (ref 11–15)
GLUCOSE SERPL-MCNC: 78 MG/DL (ref 65–139)
HCT VFR BLD AUTO: 41.8 % (ref 35–45)
HGB BLD-MCNC: 13.7 G/DL (ref 11.7–15.5)
LYMPHOCYTES # BLD AUTO: 1824 CELLS/UL (ref 850–3900)
LYMPHOCYTES NFR BLD AUTO: 29.9 %
MCH RBC QN AUTO: 33.6 PG (ref 27–33)
MCHC RBC AUTO-ENTMCNC: 32.8 G/DL (ref 32–36)
MCV RBC AUTO: 102.5 FL (ref 80–100)
MONOCYTES # BLD AUTO: 848 CELLS/UL (ref 200–950)
MONOCYTES NFR BLD AUTO: 13.9 %
NEUTROPHILS # BLD AUTO: 3325 CELLS/UL (ref 1500–7800)
NEUTROPHILS NFR BLD AUTO: 54.5 %
PLATELET # BLD AUTO: 222 THOUSAND/UL (ref 140–400)
PMV BLD REES-ECKER: 9.5 FL (ref 7.5–12.5)
POTASSIUM SERPL-SCNC: 4.4 MMOL/L (ref 3.5–5.3)
PROT SERPL-MCNC: 6 G/DL (ref 6.1–8.1)
RBC # BLD AUTO: 4.08 MILLION/UL (ref 3.8–5.1)
SODIUM SERPL-SCNC: 136 MMOL/L (ref 135–146)
WBC # BLD AUTO: 6.1 THOUSAND/UL (ref 3.8–10.8)

## 2025-04-05 LAB
C COLI+JEJUNI+LARI FUSA STL QL NAA+PROBE: NOT DETECTED
EC STX1 GENE STL QL NAA+PROBE: NOT DETECTED
EC STX2 GENE STL QL NAA+PROBE: NOT DETECTED
NOROV GI+II ORF1-ORF2 JNC STL QL NAA+PR: NOT DETECTED
RVA NSP5 STL QL NAA+PROBE: NOT DETECTED
SALMONELLA SP RPOD STL QL NAA+PROBE: NOT DETECTED
SHIGELLA DNA SPEC QL NAA+PROBE: NOT DETECTED
V CHOL+PARA RFBL+TRKH+TNAA STL QL NAA+PR: NOT DETECTED
Y ENTERO RECN STL QL NAA+PROBE: NOT DETECTED

## 2025-04-14 LAB
CRYPTOSP AG STL QL IA: NORMAL
G LAMBLIA AG STL QL IA: NORMAL
O+P STL CONC: NORMAL
O+P STL TRI STN: NORMAL

## 2025-04-15 DIAGNOSIS — K63.8219 SMALL INTESTINAL BACTERIAL OVERGROWTH (SIBO): Primary | ICD-10-CM

## 2025-04-15 RX ORDER — DOXYCYCLINE 100 MG/1
100 CAPSULE ORAL 2 TIMES DAILY
Qty: 20 CAPSULE | Refills: 1 | Status: SHIPPED | OUTPATIENT
Start: 2025-04-15 | End: 2025-04-25

## 2025-04-25 LAB
CRYPTOSP AG STL QL IA: NORMAL
G LAMBLIA AG STL QL IA: NORMAL
O+P STL TRI STN: NORMAL

## 2025-04-26 LAB
25(OH)D3+25(OH)D2 SERPL-MCNC: 58 NG/ML (ref 30–100)
ALT SERPL-CCNC: 19 U/L (ref 6–29)

## 2025-05-01 ENCOUNTER — APPOINTMENT (OUTPATIENT)
Dept: RHEUMATOLOGY | Facility: CLINIC | Age: 75
End: 2025-05-01
Payer: MEDICARE

## 2025-05-01 VITALS — HEART RATE: 63 BPM | SYSTOLIC BLOOD PRESSURE: 104 MMHG | DIASTOLIC BLOOD PRESSURE: 69 MMHG | OXYGEN SATURATION: 96 %

## 2025-05-01 DIAGNOSIS — M81.0 OSTEOPOROSIS, UNSPECIFIED OSTEOPOROSIS TYPE, UNSPECIFIED PATHOLOGICAL FRACTURE PRESENCE: Primary | ICD-10-CM

## 2025-05-01 DIAGNOSIS — Z79.899 HIGH RISK MEDICATION USE: ICD-10-CM

## 2025-05-01 PROCEDURE — 1159F MED LIST DOCD IN RCRD: CPT | Performed by: INTERNAL MEDICINE

## 2025-05-01 PROCEDURE — 1125F AMNT PAIN NOTED PAIN PRSNT: CPT | Performed by: INTERNAL MEDICINE

## 2025-05-01 PROCEDURE — 99205 OFFICE O/P NEW HI 60 MIN: CPT | Performed by: INTERNAL MEDICINE

## 2025-05-01 PROCEDURE — 1123F ACP DISCUSS/DSCN MKR DOCD: CPT | Performed by: INTERNAL MEDICINE

## 2025-05-01 ASSESSMENT — PAIN SCALES - GENERAL: PAINLEVEL_OUTOF10: 2

## 2025-05-01 NOTE — PROGRESS NOTES
Subjective   Patient ID: [id number]   Javier Calderón is a 75 y.o. female who presents for New Patient Visit (npv).    HPI  Recall:   Previously seeing Dr. 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 Prednisone was discontinued in October 2023.     Current meds:  Prolia 03/2022     Prior meds:  Reclast x3 March 2018, April of 2019 and February 2020     Last got prolia a month ago, tolerating well     History of falls / fractures: yes, a nondisplaced transverse fracture through the inferior pole of the patella 2022.  Loss of height: yes, half an inch   Tobacco: no  Alcohol: no  Vitamin D supplementation: yes, 3000 units daily in the winter and 2000 units daily in the summer   Calcium supplementation: yes, 1000 mg daily   Weight bearing exercise: yes, walks and weights every day   Previous or planned invasive jaw surgery: yes, previously gum replacement   History of radiation: no  Chronic steroid use: no  History of RA: no  GERD: no  Wenceslao-en-y: no  CKD / GFR <30: no  Parental hip fracture: yes, mother     ROS  Denies fever, chills, weight loss. + dry eyes and uses refresh tears. No ocular inflx. + dry mouth. No dental loss, loss of taste, nasal or oral ulcers, difficulty swallowing. Denies chest pain. Denies shortness of breath, cough, asthma, or recurrent respiratory infections. Denies nausea, vomiting, heartburn, abdominal pain, constipation, diarrhea, melena or hematochezia. No recurrent urinary infections or STDs, no genital or anal ulcers. Denies rash or lesions. Denies history of clots (arterial or venous).     PMH/PSH: cervical OA, osteoporosis, Aflutter and afib s/p ablation, SIBO, SHANE and CPAP, L EFREM   Social: Nonsmoker, no alcohol or drug use. 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   FHx: Mother with OP      Problem List[1]     Medical History[2]     Surgical History[3]     Social History     Socioeconomic History    Marital status: Single     Spouse name: Not on file    Number of children: Not on file    Years of education: Not on file    Highest education level: Not on file   Occupational History    Not on file   Tobacco Use    Smoking status: Never    Smokeless tobacco: Never   Vaping Use    Vaping status: Never Used   Substance and Sexual Activity    Alcohol use: Not Currently    Drug use: Never    Sexual activity: Not Currently   Other Topics Concern    Not on file   Social History Narrative    Not on file     Social Drivers of Health     Financial Resource Strain: Not on file   Food Insecurity: Not on file   Transportation Needs: Not on file   Physical Activity: Not on file   Stress: Not on file   Social Connections: Not on file   Intimate Partner Violence: Not on file   Housing Stability: Not on file        RX Allergies[4]     Current Medications[5]       Objective     Visit Vitals  /69 (BP Location: Left arm, Patient Position: Sitting, BP Cuff Size: Adult)   Pulse 63      Physical Exam  General: AAOx3, Cooperative  Head: normocephalic, atraumatic  Eyes: EOMI, conjunctiva clear, sclera white, anicteric  Throat/Mouth: No oral deformities, no cheek swelling, mucosa appear moist, or loss of dentition   Neuro: CN II-XII grossly intact, no focal deficit  MSK: No active synovitis of upper or lower extremities      Lab Results   Component Value Date    WBC 6.1 04/03/2025    HGB 13.7 04/03/2025    HCT 41.8 04/03/2025    .5 (H) 04/03/2025     04/03/2025        Chemistry    Lab Results   Component Value Date/Time     04/03/2025 1226    K 4.4 04/03/2025 1226    CL 99 04/03/2025 1226    CO2 29 04/03/2025 1226    BUN 19 04/03/2025 1226    CREATININE 0.67 04/03/2025 1226    Lab Results   Component Value Date/Time    CALCIUM 8.9 04/03/2025 1226    ALKPHOS  "65 04/03/2025 1226    AST 25 04/03/2025 1226    ALT 19 04/25/2025 0932    BILITOT 0.4 04/03/2025 1226           Lab Results   Component Value Date    CRP <0.10 11/14/2023      Lab Results   Component Value Date    CARLOS NEGATIVE 12/05/2017    SEDRATE 4 11/14/2023      No results found for: \"CKTOTAL\"  Lab Results   Component Value Date    NEUTROABS 3.22 11/13/2024      No results found for: \"FERRITIN\"   Lab Results   Component Value Date    HEPAIGM NONREACTIVE 03/07/2022    HEPBCIGM NONREACTIVE 03/07/2022    HEPCAB NONREACTIVE 03/07/2022      Lab Results   Component Value Date    ALT 19 04/25/2025    AST 25 04/03/2025    ALKPHOS 65 04/03/2025    BILITOT 0.4 04/03/2025      No results found for: \"PPD\"   No results found for: \"URICACID\"   Lab Results   Component Value Date    PTH 74.8 04/04/2023    CALCIUM 8.9 04/03/2025    CAION 1.17 06/22/2018    PHOS 3.6 04/04/2023      Lab Results   Component Value Date    SPEP NORMAL 08/10/2018      No results found for: \"ALBUR\", \"[id number]\"     DEXA 2022  Lumbar spine (L1-L4): 1.026 g/cm2, T-score -1.3, Z-score 0.9   Lumbar spine: 01/15/2020: 1.052 g/cm2   Lumbar spine change: Stable bone mass  0347-9213     Right Femoral Neck: 0.693 g/cm2,  T-score -2.5, Z-score -0.4   Right Femoral Neck: 01/15/202: 0.706 g/cm2   Right Femoral Neck Change: Stable bone mass  3986-9656     Right Total Hip: 0.734 g/cm2,  T-score -2.2, Z-score -0.3   Right Total Hip: 01/15/2020: 0.733 g/cm2   Right Total Hip Change: Stable bone mass  9190-8110     DEXA 02/2024  RESULTS:     Lumbar spine (L1, L2, L3, L4): 1.093 g/cm2, T-score -0.7, Z-score 1.5   Lumbar spine: 1/22: 1.026 g/cm2   Statistically significant increase     Right Femoral Neck: 0.670 g/cm2, T-score -2.7, Z-score -0.5   Right Femoral Neck: 1/22: 0.693 g/cm2   No statistically significant change     Right Total Hip: 0.722 g/cm2, T-score -2.3, Z-score -0.2   Right Total Hip: 1/22: 0.734 g/cm2   No statistically significant change "     Assessment/Plan    Here to re-establish care.     She has osteoporosis and is on prolia since 03/2022, tolerating well.     DEXA 2024 reviewed, lumbar spine statistically significant increase, RFN and RTH with mild decline but not statistically significant, also DEXA at that time was performed following 6 months to a year of prednisone.     - Labs 1 week prior to prolia  - Set up for her next prolia in September already   - Continue calcium and vitamin D  - DeXA ordered for 02/2026    RTC in 6 months or sooner as needed      Rommel Cheema MD, MD  Division of Rheumatology   [facility]          [1]   Patient Active Problem List  Diagnosis    Allergic bronchitis (HHS-HCC)    Allergic reaction to bee sting    Allergic urticaria    Arthritis    A-fib (Multi)    Atrial flutter (Multi)    Paroxysmal atrial fibrillation (Multi)    Elevated LDL cholesterol level    Elevated MCV    Esophageal reflux    Essential (hemorrhagic) thrombocythemia    Fatigue    Heart palpitations    High triglycerides    Hyponatremia    Abnormal liver function test    Low vitamin D level    Mechanical low back pain    Menopausal and postmenopausal disorder    Osteoarthritis, multiple sites    Osteoporosis without current pathological fracture    Otalgia, right    Otalgia    Peritoneal hematoma    Presence of cardiac and vascular implant and graft    Primary localized osteoarthritis of left hip    Scoliosis    Segmental and somatic dysfunction of pelvic region    Urine abnormality    Vaginal dryness, menopausal    Ventricular tachycardia (Multi)    Wasp sting    Small intestinal bacterial overgrowth (SIBO)    SHANE (obstructive sleep apnea)    Dyslipidemia    Primary osteoarthritis of both first carpometacarpal joints    Other constipation    Dietary counseling and surveillance    Insomnia   [2]   Past Medical History:  Diagnosis Date    Abnormal finding of lung 09/09/2023    Abnormal levels of other serum enzymes 03/08/2022     Elevated liver enzymes    Abnormal renal function 09/09/2023    Abnormal weight loss 09/27/2018    Weight loss    Aortic thrombus (Multi) 09/09/2023    Dark stools 09/09/2023    Diarrhea, unspecified 12/31/2018    Diarrhea    Disorder of kidney and ureter, unspecified 11/08/2022    Abnormal renal function    Disorder of teeth and supporting structures, unspecified 02/22/2019    Dental disorder    Elevated liver enzymes 09/09/2023    Encounter for general adult medical examination without abnormal findings 03/08/2022    Encounter for Medicare annual wellness exam    Encounter for immunization 03/08/2022    Need for pneumococcal vaccination    Encounter for other screening for malignant neoplasm of breast 02/21/2020    Screening for malignant neoplasm of breast    Encounter for other screening for malignant neoplasm of breast 06/09/2022    Breast screening    Encounter for screening mammogram for malignant neoplasm of breast 02/14/2018    Visit for screening mammogram    Gastro-esophageal reflux disease without esophagitis 06/23/2020    Esophageal reflux    Hypo-osmolality and hyponatremia 09/25/2019    Hyponatremia    Injury of peritoneum, initial encounter 08/23/2019    Peritoneal hematoma    Long term (current) use of anticoagulants 09/28/2018    Chronic anticoagulation    Otalgia, right ear 12/31/2018    Otalgia, right    Otalgia, unspecified ear 12/31/2018    Otalgia    Other abnormality of red blood cells 11/08/2022    Elevated MCV    Other conditions influencing health status 11/22/2021    Oral disorder    Other fatigue 03/19/2020    Fatigue    Other fecal abnormalities 06/29/2018    Dark stools    Other specified abnormal findings of blood chemistry 11/16/2022    Low vitamin D level    Other specified abnormal findings of blood chemistry 02/21/2022    Abnormal liver function test    Other specified diseases of intestine     Small intestinal bacterial overgrowth (SIBO)    Other specified symptoms and signs  involving the circulatory and respiratory systems 11/17/2017    Abnormal finding of lung    Personal history of diseases of the blood and blood-forming organs and certain disorders involving the immune mechanism 03/18/2020    History of thrombocytosis    Personal history of diseases of the blood and blood-forming organs and certain disorders involving the immune mechanism 03/18/2020    History of thrombocytosis    Personal history of other specified conditions 12/20/2022    History of nausea    Personal history of other specified conditions 12/20/2022    History of abdominal pain    Pharyngoesophageal dysphagia 09/09/2023    Pulmonary nodule 09/09/2023    Pure hypercholesterolemia, unspecified 11/08/2022    Elevated LDL cholesterol level    Pure hyperglyceridemia 11/16/2022    High triglycerides    Rash and other nonspecific skin eruption 12/30/2020    Rash    Toxic effect of venom of wasps, accidental (unintentional), initial encounter 09/18/2017    Wasp sting    Unspecified abnormal findings in urine 03/04/2022    Urine abnormality    Unspecified menopausal and perimenopausal disorder 01/23/2018    Menopausal and postmenopausal disorder   [3]   Past Surgical History:  Procedure Laterality Date    CATARACT EXTRACTION  01/08/2018    Cataract Surgery    CT ABDOMEN PELVIS ANGIOGRAM W AND/OR WO IV CONTRAST  6/12/2018    CT ABDOMEN PELVIS ANGIOGRAM W AND/OR WO IV CONTRAST 6/12/2018 [facility]    CT ABDOMEN PELVIS ANGIOGRAM W AND/OR WO IV CONTRAST  6/14/2018    CT ABDOMEN PELVIS ANGIOGRAM W AND/OR WO IV CONTRAST 6/14/2018 [facility]    IR ANGIOGRAM INFERIOR EPIGASTRIC PELVIC  6/14/2018    IR ANGIOGRAM INFERIOR EPIGASTRIC PELVIC 6/14/2018 [facility]    IR EMBOLIZATION LYMPH NODE Bilateral 6/14/2018    IR EMBOLIZATION LYMPH NODE 6/14/2018 [facility]    IR EMBOLIZATION LYMPH NODE Bilateral 6/14/2018    IR EMBOLIZATION LYMPH NODE 6/14/2018 [facility]    OTHER SURGICAL HISTORY  03/07/2018    Programming  And Iterative Adjustment Of Implantable Loop Recorder    OTHER SURGICAL HISTORY  11/08/2022    Esophagogastroduodenoscopy   [4]   Allergies  Allergen Reactions    Bee Venom Protein (Honey Bee) Unknown    Pollen Extracts Cough   [5]   Current Outpatient Medications:     ascorbic acid,sod/zinc gluc,ox (ZINC AND C ORAL), Take by mouth., Disp: , Rfl:     aspirin 81 mg EC tablet, Take 1 tablet (81 mg) by mouth once daily., Disp: , Rfl:     azelastine (Astelin) 137 mcg (0.1 %) nasal spray, Administer 1 spray into each nostril 2 times a day. Use in each nostril as directed, Disp: 30 mL, Rfl: 12    b complex 0.4 mg tablet, Take 1 tablet by mouth once daily., Disp: , Rfl:     budesonide-formoteroL (Symbicort) 160-4.5 mcg/actuation inhaler, Inhale 2 puffs 2 times a day. Rinse mouth with water after use to reduce aftertaste and incidence of candidiasis. Do not swallow., Disp: 10.2 g, Rfl: 5    calcium carbonate 600 mg calcium (1,500 mg) tablet, Take 1 tablet (1,500 mg) by mouth once daily., Disp: , Rfl:     cholecalciferol (Vitamin D-3) 25 MCG (1000 UT) tablet, Take as directed., Disp: , Rfl:     denosumab (Prolia) 60 mg/mL syringe, Inject 1 mL (60 mg total) under the skin every 6 months., Disp: 1 mL, Rfl: 1    estradiol (Estrace) 0.01 % (0.1 mg/gram) vaginal cream, Discard the applicator and apply a pea sized amount to the vaginal opening and just inside the vagina 3 times/week, Disp: 42.5 g, Rfl: 1    estradiol (Estrace) 0.01 % (0.1 mg/gram) vaginal cream, Apply a blueberry sized amount around the vaginal opening and just inside the vagina three times a week, Disp: 42.5 g, Rfl: 3    fluticasone (Cutivate) 0.05 % cream, Apply topically if needed (rash). Apply inch., Disp: 60 g, Rfl: 0    fluticasone (Flonase) 50 mcg/actuation nasal spray, Administer 2 sprays into each nostril once daily., Disp: 48 g, Rfl: 0    MELATONIN ORAL, Take by mouth., Disp: , Rfl:     MULTIVITAMIN ORAL, Take 1 capsule by mouth once daily., Disp: , Rfl:      mupirocin (Bactroban) 2 % ointment, Apply topically 3 times a day. Apply ointment topically to affected area., Disp: , Rfl:     nitroglycerin (Nitrostat) 0.4 mg SL tablet, Place 1 tablet (0.4 mg) under the tongue every 5 minutes if needed for chest pain. For up to 3 doses as needed. Call 911 if pain persists., Disp: 25 tablet, Rfl: 0    triamcinolone (Kenalog) 0.1 % cream, Apply 1 Application topically., Disp: , Rfl:

## 2025-05-07 DIAGNOSIS — K63.8219 SMALL INTESTINAL BACTERIAL OVERGROWTH (SIBO): Primary | ICD-10-CM

## 2025-05-07 RX ORDER — METRONIDAZOLE 250 MG/1
250 TABLET ORAL 3 TIMES DAILY
Qty: 30 TABLET | Refills: 0 | Status: SHIPPED | OUTPATIENT
Start: 2025-05-07 | End: 2025-05-17

## 2025-05-07 NOTE — PROGRESS NOTES
Metronidazole for 1 times a 10-day course since she appears to have had a transient improvement with doxycycline that was very short-lived.

## 2025-05-08 ENCOUNTER — OFFICE VISIT (OUTPATIENT)
Dept: GERIATRIC MEDICINE | Facility: HOSPITAL | Age: 75
End: 2025-05-08
Payer: MEDICARE

## 2025-05-08 ENCOUNTER — DOCUMENTATION (OUTPATIENT)
Dept: GERIATRIC MEDICINE | Facility: HOSPITAL | Age: 75
End: 2025-05-08

## 2025-05-08 ENCOUNTER — OFFICE VISIT (OUTPATIENT)
Dept: BEHAVIORAL HEALTH | Facility: HOSPITAL | Age: 75
End: 2025-05-08
Payer: MEDICARE

## 2025-05-08 VITALS — DIASTOLIC BLOOD PRESSURE: 64 MMHG | SYSTOLIC BLOOD PRESSURE: 101 MMHG | HEART RATE: 81 BPM

## 2025-05-08 DIAGNOSIS — R41.3 MEMORY LOSS: Primary | ICD-10-CM

## 2025-05-08 PROCEDURE — 99483 ASSMT & CARE PLN PT COG IMP: CPT | Performed by: NURSE PRACTITIONER

## 2025-05-08 NOTE — PROGRESS NOTES
Senior Assessment Program Assessment      Reason for referrals: Self  Referral source: On-line  Current providers: Andrew Ayoub MD (PCP), El Naranjo MD (GI), Rommel Cheema MD (Rheum), Vanna AGUIRRE-KYAW (Sleep), Barbara Grimaldo MD (OBGYN), Rafal Guzman MD PhD (Neuro - retired or left his current practice)  Living situation: Home alone  Past diagnosis: ***  Guardian Status: None  Caregiver Status: None             Patient's chief complaint:     ID: Javier Claderón is a pleasant and appropriate 75 y.o.  female w/ PMH of A-fib (s/p loop recorder in 2018), osteoporosis, Vit D defic, SHANE, diarrhea (possibly SIBO), and a head strike early 2024 while swimming who presents today for memory issues.     Notice her memory getting worse about 1 year ago after a head injury while swimming and after getting ill after taking the shingles vaccine. Finds her issues ebb and flow. Made worse with antihistamines. Forgetting words. Having difficulty finding the right words to express things.     She started seeing a neurologist (Dr. Guzman) who was going to see her again, but ultimately left his current practice instead. She had an MRI of her brain in April has been hoping to get a PET scan of her brain to look for Amylase. She is concerned that she is developing Alzheimer's. Notes that she applied for for a drug trial that would have gotten her the scan, but they did not accept her because the drug would conflict with her Prolia.    Describes herself as a happy person with great fabien in God, proactive with her health. Denies depressed mood or panic attacks.       Mental Health Treatment History  Mental Health Treatment: None  Past Diagnosis: No previous psychiatric  Current provider/Past:  Current PCP:   Therapy: none  Admissions: none  Self harming/suicide attempts/ideations: none  ECT/Ketamine/EMDR therapy/CBT/psychotherapy/group/individual/IOP/PHP: none       Mental Health Medication History/Current  "Medications/Past Medications:     Medications:  - No current mental health medications  - Current meds: ASA 81 mg, Astelin nasal spray, B complex vitamin, Symbicort, Calcium carbonate, 1000UT Vid D3, denosumab (Prolia) 60 mg, melatonin, nitrostat prn, Estradiol cream, fluticasone cream, and fluticasone spray        Suicide:       Violence:       Substance Use History:       Family History:        Social History:  / 20 years ago, 3d of 8 siblings, 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, retired RN and  (retired 2013)      :        Abuse History:        Physical Abuse: {Yes, No:14795}       Sexual Abuse: {Yes, No:15644}       Verbal / Emotional Abuse / Bullying (+Cyber): {Yes, No:08959}        Financial Abuse: {Yes, No:49959}       Domestic Violence: {Yes, No:47211}     REVIEW OF SYSTEMS:  Mood: \"SIGECAPS\"  - Sleep Changes  - Interest Loss  - Guilt/Feeling Worthless  - Energy Loss  - Concentration Difficulties  - Appetite Changes  - Psychomotor Agitation/Retardation  - Suicidal Ideation  Anxiety:  PTSD/Trauma:  Psychosis:  Substances/abuse/ withdrawal:   Medications/drug interactions:  Neurological symptoms:    OBJECTIVE INFORMATION:          12/23/2024     1:00 PM 1/31/2025     7:48 AM 1/31/2025     8:00 AM 2/27/2025     3:29 PM 3/7/2025    10:03 AM 3/27/2025     9:57 AM 5/1/2025     2:59 PM   Vitals   Systolic 122 100 134 108 112 108 104   Diastolic 68 52 65 55 68 64 69   BP Location    Left arm  Right arm Left arm   Heart Rate  72 66 63  71 63   Temp  36.5 °C (97.7 °F)  36.7 °C (98.1 °F)  36.2 °C (97.2 °F)    Resp  18 18   16    Height  1.626 m (5' 4\")        Weight (lb)  113.1   111     BMI  19.41 kg/m2   19.05 kg/m2     BSA (m2)  1.52 m2   1.51 m2     Visit Report Report   Report Report  Report        There were no vitals filed for this visit.     [id number]        Current Outpatient Medications   Medication Instructions    ascorbic acid,sod/zinc gluc,ox (ZINC AND C ORAL) Take by mouth.    aspirin 81 mg EC " tablet 1 tablet, Daily    azelastine (Astelin) 137 mcg (0.1 %) nasal spray 1 spray, Each Nostril, 2 times daily, Use in each nostril as directed    b complex 0.4 mg tablet 1 tablet, Daily    budesonide-formoteroL (Symbicort) 160-4.5 mcg/actuation inhaler 2 puffs, inhalation, 2 times daily RT, Rinse mouth with water after use to reduce aftertaste and incidence of candidiasis. Do not swallow.    calcium carbonate 600 mg calcium (1,500 mg) tablet 1 tablet, Daily    cholecalciferol (Vitamin D-3) 25 MCG (1000 UT) tablet Take as directed.    denosumab (PROLIA) 60 mg, subcutaneous, Every 6 months    estradiol (Estrace) 0.01 % (0.1 mg/gram) vaginal cream Discard the applicator and apply a pea sized amount to the vaginal opening and just inside the vagina 3 times/week    estradiol (Estrace) 0.01 % (0.1 mg/gram) vaginal cream Apply a blueberry sized amount around the vaginal opening and just inside the vagina three times a week    fluticasone (Cutivate) 0.05 % cream Topical, As needed, Apply inch.    fluticasone (Flonase) 50 mcg/actuation nasal spray 2 sprays, Each Nostril, Daily    MELATONIN ORAL Take by mouth.    metroNIDAZOLE (FLAGYL) 250 mg, oral, 3 times daily    MULTIVITAMIN ORAL 1 capsule, Daily    mupirocin (Bactroban) 2 % ointment 3 times daily RT    nitroglycerin (NITROSTAT) 0.4 mg, sublingual, Every 5 min PRN, For up to 3 doses as needed. Call 911 if pain persists.    triamcinolone (Kenalog) 0.1 % cream 1 Application        MMSE:                                                                                                                   General: ***  Appearance: ***(comment on * grooming and hygiene, appears stated age)  Attitude: ***  Behavior: ***appropriate eye contact  Movement: *** (please comment on psychomotor factors *no psychomotor agitation or retardation, please comment on gait/station *walks unaided or with cane  Speech and language:  ***(comment on *regular rate, rhythm, volume and tone,  spontaneity and fluency or not)  Mood: ***  Affect:  ***  Thought process: *** (comment which apply: linear, organized, logical vs illogical, disorganized)  Thought content: *** (comment on SI/HI/delusions)  Perception: *** (comment on AH, VH, if appears or not to be responding to hallucinatory stimuli)   Cognition:  *** (comment on level of alertness and oriented to person, place, time, purpose for visit), comment on today's MOCA and level of impairment or intact short and long term memory being within or without limits in  attention, concentration  Insight: *** (comment if (*intact, limited, fair or poor)    Judgment: *** (comment on decision making capacity *can or can not  make reasonable decisions about ordinary activities of daily living and necessary medical care recommendations    CRANIAL NERVE EXAM  ·  Cranial Nerves: II, III, IV, VI: vision grossly within functional limits. PERRLA. Extraocular movements are grossly intact  ·  Cranial Nerves: V: facial sensation intact bilaterally  ·  Cranial Nerves: VII: smile symmetric  ·  Cranial Nerves: VIII: hearing grossly intact bilaterally  ·  Cranial Nerves: IX, X: phonation normal. palate and uvula elevate symmetrically  ·  Cranial Nerves: XI: shoulder shrug strong, equal bilaterally  ·  Cranial Nerves: XII: tongue midline, symmetrical  ·  Reflexes: Reflexes grossly intact. No clonus  ·  Sensation: sensation is grossly intact to pain and light touch.  ·  Motor: Muscle tone within functional limits. Strength is 5/5 x4  ·  Cerebellar: finger to nose touch within normal limits. Gait is steady with a normal base. Coordination grossly intact  FUNCTIONAL ESTIMATES  Estimate of Intelligence: *** above average, average, below average   Estimate of Capacity for Activities of Daily Living:  *** independent, requires assistance, requires full care     Nicotine Use Screening: ***    *( If patient smokes, include this education and documentation, below)     Nicotine  use:  Education provided, nicotine replacement products/Zyban, referral to smoking cessation program on discharge, sw to send referral.  - Risks of continued tobacco use, as well as street drug use, and alcohol use, was addressed with the patient which included: inpatient education and counseling of the risks of oral, esophageal as well as other organ cancers (including gastric as well as pancreatic), along with the ongoing risk of neurologic and cardiovascular disease and events strokes, angina).   - Treatment options for tobacco cessation was offered to include: alternate tobacco products, both inpatient and outpatient counseling on tobacco dependence. Nicotine replacement product was offered during this hospitalization period and will be prescribed at the time of discharge, along with a referral made for outpatient cessation counseling.   - Treatment options for street drug use and alcohol use discussed with patient. Outpatient counseling/rehab was discussed with patient and will be offered at time of discharge. Outpatient referral will be made for outpatient substance abuse at time of discharge, pending patient's final approval.     Past Medical History:   Afib/Aflutter, osteoporosis, osteoarthritis, Vit D defic, SHANE on CPAP      Assessment & Plan               Medication Consent  Medication Consent: {Medication Consent:[id number]}      Elsa Barraza MD

## 2025-05-08 NOTE — PROGRESS NOTES
*Chief Complaint*  Senior assessment    *History of Present Illness*  Javier Calderón is a pleasant and appropriate 75 y.o.  female w/ PMH of A-fib (s/p ablation), osteoporosis, Vit D defic, SHANE, diarrhea (possibly SIBO), and a head strike early 2024 while swimming who presents today for memory issues.     Notice her memory getting worse about 1 year ago after a head injury while swimming and after getting ill after taking the shingles vaccine. Finds her issues ebb and flow. Made worse with antihistamines. Forgetting words. Having difficulty finding the right words to express things.     She started seeing a neurologist (Dr. Guzman) who was going to see her again, but he ultimately left his current practice instead. She had an MRI of her brain in April and has been hoping to get a PET scan of her brain to look for Amylase. She is concerned that she is developing Alzheimer's. Notes that she applied for for a drug trial that would have gotten her the scan, but they did not accept her because the drug would conflict with her Prolia.     She found out about our clinic from the  Newsletter that she receives regularly and was hoping that she could be evaluated.    Describes herself as a happy person with great fabien in God, very proactive with her health. Denies depressed mood or panic attacks.        PCP: Andrew Ayoub MD  Referred by:  Senior News Letter  Accompanied by: Alone    Chief Complaint: memory loss   - Short term / Long Term: Short term   - Word finding: Yes   - Name recognition: No   - Forgetting appointments: No   - Medication errors: No   - Wandering: No   - Sleep / Wake: Light sleeper for years, waking up every 1.5 hours, started CPAP ~6 months ago which she finds is helping, has tried various medications for insomnia with various side effects, has started taking Magnesium which she also finds helpful.   - Disorientation: No    Previous workup:  Lab Results   Component Value Date     "WBC 6.1 04/03/2025    HGB 13.7 04/03/2025    HCT 41.8 04/03/2025    .5 (H) 04/03/2025     04/03/2025     Lab Results   Component Value Date    GLUCOSE 78 04/03/2025    CALCIUM 8.9 04/03/2025     04/03/2025    K 4.4 04/03/2025    CO2 29 04/03/2025    CL 99 04/03/2025    BUN 19 04/03/2025    CREATININE 0.67 04/03/2025    AST 25 04/03/2025    ALT 19 04/25/2025    ALKPHOS 65 04/03/2025     Lab Results   Component Value Date    TSH 2.32 11/25/2024      No results found for: \"HGBA1C\"   Lab Results   Component Value Date    VZWUAGWH33 632 04/15/2024      No results found for: \"HOMOCYSTEINE\"   No results found for: \"FOLATE\"   Lab Results   Component Value Date    CHOL 151 05/06/2024    CHOL 162 04/15/2024    CHOL 169 11/14/2023     Lab Results   Component Value Date    HDL 61.3 05/06/2024    HDL 58.8 04/15/2024    HDL 65.6 11/14/2023     Lab Results   Component Value Date    LDLCALC 77 05/06/2024    LDLCALC 48 04/15/2024    LDLCALC 86 11/14/2023     Lab Results   Component Value Date    TRIG 62 05/06/2024    TRIG 277 (H) 04/15/2024    TRIG 88 11/14/2023      - Imaging  MR brain wo IV contrast 05/30/2024    Narrative  Interpreted By:  Luis Ramirez,  STUDY:  MR BRAIN WO IV CONTRAST;  5/30/2024 10:16 am    INDICATION:  Signs/Symptoms: dementia.    COMPARISON:  None.    ACCESSION NUMBER(S):  [id number]    ORDERING CLINICIAN:  CLEVE NUNEZ    TECHNIQUE:  Axial diffusion, axial T2, axial FLAIR, axial gradient echo T2,  coronal T1, and sagittal T1 weighted MRI images of the brain were  obtained without intravenous contrast administration.    FINDINGS:  The diffusion weighted images fail to demonstrate evidence of  abnormal diffusion restriction to suggest acute infarction.    There is mild brain parenchymal volume loss.    There are scattered nonspecific white matter changes within the  cerebral hemispheres bilaterally which while nonspecific, given the  patient's age, likely represent sequelae of more " remote small-vessel  ischemic change.    The gradient echo T2 weighted images demonstrate no abnormal blooming  dark signal to suggest intracranial hemorrhage.    No abnormal intracranial mass lesion is noted on this noncontrast MRI  study.    There is minimal mucosal thickening noted within the inferior  maxillary sinuses and scattered ethmoid air cells.    The mastoid air cells are clear.    Impression  There is mild brain parenchymal volume loss.    There are scattered nonspecific white matter changes within the  cerebral hemispheres bilaterally which while nonspecific, given the  patient's age, likely represent sequelae of more remote small-vessel  ischemic change.      MACRO:  None.    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)    Diet: Low FODMAP    Allergies: Bee venom, pollen extracts    Meds: ASA 81 mg, Astelin nasal spray, B complex vitamin, Symbicort, Calcium carbonate, 1000UT Vid D3, denosumab (Prolia) 60 mg, Magnesium supplement, nitrostat prn, Estradiol cream, fluticasone cream, and fluticasone spray    VS - BP: 101/64  P: 81 bpm  SpO2: 96%    Current residence: [place]    Marital Status:    - Name(s) of offspring: Akin Serrano   - Name(s) of pets: Lisa (son's dog)    Educational level: College  Education: Masters in Counseling    Main lifelong occupation: RN    Hobbies: Reads, plays brain games, paints landscapes    Social engagement: (x) Frequent, () Occasional, () None    Social activities: Pool, [facility] [facility], chats with people while on walks, Mu-[employer]    Social support: friends and family    Finance: (x)  "sufficient, () break even, () insufficient    Driving: (x) yes, () no    Personal safety concerns: () yes, (x) no    Home safety concerns: () yes, (x) no    Functional status:   - Appetite: (x) WNL, () Fair, () Poor   - Swallow: () WNL, () Impaired solids, (x) Impaired fluids - maybe once per month feels like fluid has \"gone down wrong pipe\"   - Elimination - Bowel: (x) Continent, () Incontinent, () Constipated   - Elimination - Bladder: () Continent, () Incontinent, () Stress, (x) Urge, () Overflow   - Speech: (x) WNL, () Impaired   - Vision: () WNL, (x) Impaired   - Hearing: (x) WNL, () Impaired   - Understanding: (x) WNL, () Impaired   - Upper body strength: (x) WNL, () Impaired   - Lower body strength: (x) WNL, () Impaired   - Balance: (x) WNL, () Impaired - states one leg is longer so she has to do regular stretches   - Falls: () None, (x) Number: 1 fall, tripped over own feet over a year ago    Activity: (x) Independent, () Slow, () Assisted, () Dependent   - Ambulation inside - I   - Ambulation outside - I   - Transfers - I   - Bed - I   - Aid use (x) None, () Cane, () Walker, () WC   - Exercise (x) Frequent, () Occasional, () None    ADL: (x) Independent, () Assisted, () Dependent   - Feeding - I   - Bathing - I   - Dressing - I   - Toileting - I    IADL: () Independent, () Assisted, () Dependent   - Cooking - I   - Cleaning - I   - Shopping - I   - Medication - I   - Driving - I   - Banking - I    *Review of Systems*  All other systems reviewed are negative except as noted in the HPI    *Physical Exam*  Gen: (+) NAD, (+) well-appearing  HEENT: (+) normocephalic, (+) MMM  Neck: (+) supple  Lungs: (+) CTAB, (-) wheezes, (-) rales, (-) rhonchi  Heart: (+) RRR, (+) S1 S2, (-) murmurs  Pulses: (+) palpable  Abd: (+) soft, (+) NT, (+) ND  Ext: (-) edema, (-) deformity  MSK: (-) joint swelling  Skin: (+) warm, (+) dry, (-) rash  Neuro: (+) follows commands, (-) tremor, (+) alert    *Results*  MoCA:  27/30  PHQ9:  " 2/27  GAD7:  1/21    *Assessment / Plan*  Senior Assessment     Medical Consult   - Test results - pending   - Cognition and Memory - as above   - Additional testing pending / recommended      - home evaluation     - driving evaluation    Recommendations  #Subjective memory loss   * Diet     - The risk of developing MCI is lower in individuals who consume a Mediterranean diet      - Dietary supplementation with DHA, melatonin and tryptophan may improve cognitive function      - Eating 2 or more servings of mushrooms a week may decrease risk of memory loss    * Exercise      - Physical activity and exercise are beneficial for brain health.       - According to one study, aerobic exercise was associated with a slight improvement in cognition    * Activities     - Mentally challenging activities like crossword puzzles and brain teasers may be helpful     - Playing games, reading magazines, being engaged in crafts, computer use and social activity all reduce the risk of memory loss      - [employer] or [employer] day programs may be a good resource for social engagement   * Medications      - Donepezil has been found to delay the progression of memory loss in patients with MCI and depression      - Centrally acting angiotensin-converting enzyme inhibitors (CACE-Is) may reduce the rate of cognitive decline in patients with dementia, regardless of blood pressure levels at the time of their hypertension diagnosis. The rate of cognitive change was slowed in the first 6 months after dementia patients started taking these medications.       - Repeat MOCA in 3-6 months     #COPD  *Recommendations   - according to the GOLD COPD guidelines:      - smoking cessation is key - if you have not quit already, I would recommend that you do so; smoking cessation aides may be helpful      - try to gradually increase your physical activity, for example, by walking every day as tolerated      - continue taking your maintenance (ICS/LABA)  inhaler: Symbicort 160/4.5mcg 2puff twice a day         - please rinse your mouth after using this medication to prevent thrush      - continue azelastine 137mcg sprays BID      - continue flonase only as needed      - recommend considering rescue (ROBB) inhaler as needed: albuterol 1-2 puffs every 4 hours as needed      - please bring your inhalers with you to appointments so that we can evaluate you to make sure you are using them correctly      - in order to prevent exacerbations, you may take mucinex 600mg twice a day, and N-Acetyl Cysteine daily      - you should get a flu shot every year      - you should get a pneumococcal vaccination (PPSV23) if you are >65 years old, or if your FEV1 is <40% of predicted      - if your symptoms worsen, pulmonary rehabilitation may help      - you may need a steroid taper in case of a sudden worsening of your symptoms (exacerbation)      - if you develop worsening cough or shortness of breath, start producing more mucus, develop fevers, chills, sweats, chest pains or swelling in your legs, please seek immediate medical attention      - please follow up with your pulmonologist   Follow up as needed or in 3 months    #Osteoporosis   - defer management to rheumatology   - continue vitamin D 1000units daily   - continue calcium 600mg daily   - continue Prolia as ordered    #SIBO   - management per GI w/ flagyl   - check B12    #Vitamin deficiency   - consider simplifying medication regimen

## 2025-05-20 NOTE — PROGRESS NOTES
Consults     Dr. Elsa Barraza, psychiatrist/Luis Townsend, CNP/Yolanda Rodriguez M.ED., [facility] [place]          Impressions:  Subjective Memory loss        #Subjective memory loss     MOCA-[facility]       27 out of 30   Normal      *B12 lab work was ordered 5/12/25     *Driving evaluation ordered if needed        * Diet   The risk of developing MCI is lower in individuals who consume a Mediterranean diet      Dietary supplementation with DHA, melatonin and tryptophan may improve cognitive function      Eating 2 or more servings of mushrooms a week may decrease risk of memory loss      * Exercise   Physical activity and exercise are beneficial for brain health.      According to one study, aerobic exercise was associated with a slight improvement in cognition      * Activities   Mentally challenging activities like crossword puzzles and brain teasers may be helpful     Playing games, reading magazines, being engaged in crafts, computer use and social activity          all reduce the risk of memory loss      Senior centers or adult day programs may be a good resource for social engagement      * Medications   Donepezil has been found to delay the progression of memory loss in patients with MCI and depression     Centrally acting angiotensin-converting enzyme inhibitors (CACE-Is) may reduce the rate of cognitive decline in patients with dementia, regardless of blood pressure levels at the time of their hypertension diagnosis. The rate of cognitive change was slowed in the first 6 months after dementia patients started taking these medications.      Repeat MOCA - May 7th, 2026  10:30am          Mood          PHQ-9 Patient Health Questionnaire    2 /27- No depression reported      ALLAN-7 Generalized Anxiety disorder-  1/21 - no anxiety reported      Patient reported high anxiety on Intake prior to assessment, often takes” 1 hour to calm down”. Self-report screening indicates no anxiety or depression.       Declined needing to see Geriatric Psychiatrist     If a patient would like to see a counselor, we can provide a list of counselors.     Would recommend discussing anxiety symptoms with PCP, may consider SSRI       #COPD     According to the GOLD COPD guidelines:     Smoking cessation is key - if you have not quit already, I would recommend that you do so; smoking cessation aides may be helpful     Try to gradually increase your physical activity, for example, by walking every day as tolerated     Continue taking your maintenance (ICS/LABA) inhaler: Symbicort 160/4.5mcg 2puff twice a day        Please rinse your mouth after using this medication to prevent thrush        Continue azelastine 137mcg sprays BID        Continue flonase only as needed        Recommend considering rescue (ROBB) inhaler as needed: albuterol 1-2 puffs every 4 hours as needed      Please bring your inhalers with you to appointments so that we can evaluate you to make sure you are using them correctly      In order to prevent exacerbations, you may take mucinex 600mg twice a day, and N-Acetyl Cysteine daily     You should get a flu shot every year     You should get a pneumococcal vaccination (PPSV23) if you are >65 years old, or if your FEV1 is <40% of predicted     If your symptoms worsen, pulmonary rehabilitation may help     You may need a steroid taper in case of a sudden worsening of your symptoms (exacerbation)      If you develop worsening cough or shortness of breath, start producing more mucus, develop fevers, chills, sweats, chest pains or swelling in your legs, please seek immediate medical attention     Please follow up with your pulmonologist     Follow up as needed or in 3 months      #Osteoporosis   Defer management to rheumatology   Continue vitamin D 1000units daily   Continue calcium 600mg daily   Continue Prolia as ordered      #SIBO   Management per GI w/ flagyl   Check B12       #Vitamin deficiency   Consider  simplifying medication regimen        Occupational Therapy Recommendations: Home Evaluation-           consult   Yolanda Rodriguez M.Ed., HERNANDO      Driving Evaluation -  If needed     [facility]    #[phone]   [facility] [phone] option 2   [facility] - [phone]     2. Recommend Medical Alert Device to be worn at all times for safety.   3.  Counseling/Mental Health Support for anxiety      4. Review living will, POA for healthcare and financial and ensure that all documents are              up to date and reflective of wishes        Family Meeting     To email the Overall Findings and Recommendations to patient     The Senior Assessment Report and Recommendations will be in Epic for providers     If you have any questions please contact me.        Yolanda Rodriguez M.Ed., FIDELW

## 2025-05-24 LAB — VIT B12 SERPL-MCNC: 680 PG/ML (ref 200–1100)

## 2025-06-02 ENCOUNTER — APPOINTMENT (OUTPATIENT)
Dept: PRIMARY CARE | Facility: CLINIC | Age: 75
End: 2025-06-02
Payer: MEDICARE

## 2025-06-02 VITALS
HEART RATE: 65 BPM | DIASTOLIC BLOOD PRESSURE: 70 MMHG | SYSTOLIC BLOOD PRESSURE: 108 MMHG | WEIGHT: 113 LBS | BODY MASS INDEX: 19.4 KG/M2 | OXYGEN SATURATION: 99 % | RESPIRATION RATE: 16 BRPM

## 2025-06-02 DIAGNOSIS — D47.3 ESSENTIAL (HEMORRHAGIC) THROMBOCYTHEMIA: ICD-10-CM

## 2025-06-02 DIAGNOSIS — Z13.0 SCREENING FOR DEFICIENCY ANEMIA: ICD-10-CM

## 2025-06-02 DIAGNOSIS — Z13.228 SCREENING FOR ENDOCRINE/METABOLIC/IMMUNITY DISORDERS: ICD-10-CM

## 2025-06-02 DIAGNOSIS — Z13.29 SCREENING FOR ENDOCRINE/METABOLIC/IMMUNITY DISORDERS: ICD-10-CM

## 2025-06-02 DIAGNOSIS — Z13.0 SCREENING FOR ENDOCRINE/METABOLIC/IMMUNITY DISORDERS: ICD-10-CM

## 2025-06-02 DIAGNOSIS — E78.00 HYPERCHOLESTEROLEMIA: ICD-10-CM

## 2025-06-02 DIAGNOSIS — Z13.6 SCREENING FOR CARDIOVASCULAR CONDITION: ICD-10-CM

## 2025-06-02 DIAGNOSIS — Z00.00 MEDICARE ANNUAL WELLNESS VISIT, SUBSEQUENT: Primary | ICD-10-CM

## 2025-06-02 DIAGNOSIS — R19.7 DIARRHEA, UNSPECIFIED TYPE: ICD-10-CM

## 2025-06-02 DIAGNOSIS — I48.91 ATRIAL FIBRILLATION, UNSPECIFIED TYPE (MULTI): ICD-10-CM

## 2025-06-02 PROCEDURE — 1159F MED LIST DOCD IN RCRD: CPT | Performed by: INTERNAL MEDICINE

## 2025-06-02 PROCEDURE — 99214 OFFICE O/P EST MOD 30 MIN: CPT | Performed by: INTERNAL MEDICINE

## 2025-06-02 PROCEDURE — 1036F TOBACCO NON-USER: CPT | Performed by: INTERNAL MEDICINE

## 2025-06-02 PROCEDURE — G0439 PPPS, SUBSEQ VISIT: HCPCS | Performed by: INTERNAL MEDICINE

## 2025-06-02 PROCEDURE — 1170F FXNL STATUS ASSESSED: CPT | Performed by: INTERNAL MEDICINE

## 2025-06-02 PROCEDURE — 1158F ADVNC CARE PLAN TLK DOCD: CPT | Performed by: INTERNAL MEDICINE

## 2025-06-02 RX ORDER — MAGNESIUM 250 MG
TABLET ORAL
COMMUNITY

## 2025-06-02 ASSESSMENT — ACTIVITIES OF DAILY LIVING (ADL)
GROCERY_SHOPPING: INDEPENDENT
DRESSING: INDEPENDENT
BATHING: INDEPENDENT
TAKING_MEDICATION: INDEPENDENT
MANAGING_FINANCES: INDEPENDENT
DOING_HOUSEWORK: INDEPENDENT

## 2025-06-02 ASSESSMENT — PATIENT HEALTH QUESTIONNAIRE - PHQ9
1. LITTLE INTEREST OR PLEASURE IN DOING THINGS: NOT AT ALL
2. FEELING DOWN, DEPRESSED OR HOPELESS: NOT AT ALL
SUM OF ALL RESPONSES TO PHQ9 QUESTIONS 1 AND 2: 0

## 2025-06-02 NOTE — PROGRESS NOTES
Subjective   Patient ID: Javier Calderón is a 75 y.o. female who presents for Follow-up (6month fuv).    HPI  Patient in for a visit  Her sibo  worked with Dr Naranjo GI had doxy x 2 courses and flagyl  Low fodmap diet and no sugar alcohols   Will need handicap   New loop monitor not on blood thinner just   Had seen Dr Cheema , she ordered lab work and seeing cardio   She is on prolia, last time with reclast  had stomach upset     Patient comes in for an [facility] Annual Wellness Visit  last one done over a year ago , doing well over-all with concerns as above ,no complaints. Also is in for laboratory review and health maintenance update.  Updating family history as well.  Interval event - past medical history, surgical, social, and family history reviewed and updated.  Interval care -  Patient is    up to date with dental care.  Patient does     receive routine vision care.      Review of Systems  General: Denies fever, chills, night sweats,  Eyes: Negative for recent visual changes  Ears, Nose, Throat :  Negative for hearing changes, sinus discomfort  Dermatologic: Negative for new skin conditions, rash  Respiratory: Negative for wheezing, shortness of breath, cough  Cardiovascular: Negative for chest pain, palpitations, or leg swelling  Gastrointestinal: Negative for nausea/vomiting, abdominal pain, changes in bowel habits  Genitourinary Negative for Urinary Incontinence  urgency , frequency, discomfort   Musculoskeletal: see hpi  Neurological: Negative for headaches, dizziness    Previous history  Medical History[1]  Surgical History[2]  Social History[3]  Family History[4]  Allergies[5]  Current Outpatient Medications   Medication Instructions    ascorbic acid,sod/zinc gluc,ox (ZINC AND C ORAL) Take by mouth.    aspirin 81 mg EC tablet 1 tablet, Daily    azelastine (Astelin) 137 mcg (0.1 %) nasal spray 1 spray, Each Nostril, 2 times daily, Use in each nostril as directed    b complex 0.4 mg tablet 1 tablet, Daily     budesonide-formoteroL (Symbicort) 160-4.5 mcg/actuation inhaler 2 puffs, inhalation, 2 times daily RT, Rinse mouth with water after use to reduce aftertaste and incidence of candidiasis. Do not swallow.    calcium carbonate 600 mg calcium (1,500 mg) tablet 1 tablet, Daily    cholecalciferol (Vitamin D-3) 25 MCG (1000 UT) tablet Take as directed.    denosumab (PROLIA) 60 mg, subcutaneous, Every 6 months    estradiol (Estrace) 0.01 % (0.1 mg/gram) vaginal cream Discard the applicator and apply a pea sized amount to the vaginal opening and just inside the vagina 3 times/week    estradiol (Estrace) 0.01 % (0.1 mg/gram) vaginal cream Apply a blueberry sized amount around the vaginal opening and just inside the vagina three times a week    fluticasone (Cutivate) 0.05 % cream Topical, As needed, Apply inch.    fluticasone (Flonase) 50 mcg/actuation nasal spray 2 sprays, Each Nostril, Daily    magnesium 250 mg tablet Take by mouth.    MELATONIN ORAL Take by mouth.    MULTIVITAMIN ORAL 1 capsule, Daily    mupirocin (Bactroban) 2 % ointment 3 times daily RT    nitroglycerin (NITROSTAT) 0.4 mg, sublingual, Every 5 min PRN, For up to 3 doses as needed. Call 911 if pain persists.    triamcinolone (Kenalog) 0.1 % cream 1 Application       Objective       Physical Exam  Vital Signs: as recorded above  General: Well groomed, well nourished   Orientation:  Alert , oriented to time, place , and person   Mood and Affect:  Cooperative , no apparent distress normal affect  Skin: Good color, good turgor  Eyes: Extra ocular muscle movements intact, anicteric sclerae  Neck: Supple, full range of movement  Chest: Normal breath sounds, normal chest wall exam, symmetric, good air entry, clear to auscultation  Heart: Regular rate and rhythm, without murmur, gallop, or rubs  BACK:  no CTLS spine tenderness, no flank tenderness  Extremities: full range of movement  bilateral UE and bilateral LE,  no lower extremity edema  Neurological: Alert,  oriented, cranial nerves II-XII grossly intact except for visual acuity  Sensation:  Intact   Gait: normal steady    Assessment/Plan   Javier Calderón is a 75 y.o. female who presents for the concerns below:    Problem List Items Addressed This Visit       Essential (hemorrhagic) thrombocythemia     Afib has a loop monitor seeing EPS Dr Fitzpatrick   HYPERCHOLESTEROLEMIA PLAN: stable  continue current medications  Follow low cholesterol diet, encouraged high omega 3 fatty acid intake in diet, exercise, weight control. . Will Obtain AST/ALT, fasting lipid profile, creatine kinase  on statin if not done within past 3-6 months . Coenzyme Q10 200 mg a day if able to help increase HDL.    HYPERTENSION PLAN:  , taking blood pressure medication  Follow low salt diet, exercise as discussed, weight control. Continue current medications    Annual Wellness Visit  the risks and benefits of influenza vaccination were discussed with the patient, influenza vaccine is up to date this year  the risks and benefits of Prevnar 20  vaccination were discussed with the patient, Prevnar 20 vaccine is    up to date  the risks and benefits of pneumonia vaccination were discussed with the patient, pneumonia vaccine is     up to date   the risks and benefits of  Shingrix  vaccination were discussed with the patient, Shingrix  vaccine is   not     up to date there is a shortage of the vaccine  the risks and benefits of tetanus vaccination were discussed with the patient, tetanus vaccine is      up to date   Cardiovascular screening and counseling the risk and benefits of screening were discussed counseling on maintaining a healthy diet and due for lipid panel  Breast cancer screening and counseling , the risks and benefits of screening were discussed with the patient, and screening is current     order is in  Cervical cancer screening and counseling , the risks and benefits of screening were discussed with the patient, and screening is current      order is in  Osteoporosis screening and counseling was given on obtaining adequate amounts of calcium and vitamin D on a daily basis and weight bearing exercise such as walking  , the risks and benefits of screening were discussed with the patient, and screening is current     order is in  Colorectal cancer screening and counseling; the risks and benefits of screening were discussed with the patient, colon cancer screening is     up to date , order is in   Abdominal aortic aneurysm screening and counseling,  the risks and benefits of screening were discussed with the patient, screening is not indicated   Visual acuity / Glaucoma screening and counseling , the risks and benefits of vision screening were discussed with the patient, screening is current   HIV screening and Counseling, the risks and benefits of screening were discussed with the patient, screening is not indicated   Advance directive planning : needs to be updated information forms given to patient .  complete and up  to date   Other Preventive Counseling Provided :  fall risk reduction  seat belt use, sunscreen use , and increasing physical activity .  Patient Discussion:  plan discussed with the patient and follow-up visit needed          Discussed with:   Return in : 6 months    Portions of this note were generated using digital voice recognition software, and may contain grammatical errors       Andrew Ayoub MD  06/02/25  10:17 AM       [1]   Past Medical History:  Diagnosis Date    Abnormal finding of lung 09/09/2023    Abnormal levels of other serum enzymes 03/08/2022    Elevated liver enzymes    Abnormal renal function 09/09/2023    Abnormal weight loss 09/27/2018    Weight loss    Aortic thrombus (Multi) 09/09/2023    Dark stools 09/09/2023    Diarrhea, unspecified 12/31/2018    Diarrhea    Disorder of kidney and ureter, unspecified 11/08/2022    Abnormal renal function    Disorder of teeth and supporting structures,  unspecified 02/22/2019    Dental disorder    Elevated liver enzymes 09/09/2023    Encounter for general adult medical examination without abnormal findings 03/08/2022    Encounter for Medicare annual wellness exam    Encounter for immunization 03/08/2022    Need for pneumococcal vaccination    Encounter for other screening for malignant neoplasm of breast 02/21/2020    Screening for malignant neoplasm of breast    Encounter for other screening for malignant neoplasm of breast 06/09/2022    Breast screening    Encounter for screening mammogram for malignant neoplasm of breast 02/14/2018    Visit for screening mammogram    Gastro-esophageal reflux disease without esophagitis 06/23/2020    Esophageal reflux    Hypo-osmolality and hyponatremia 09/25/2019    Hyponatremia    Injury of peritoneum, initial encounter 08/23/2019    Peritoneal hematoma    Long term (current) use of anticoagulants 09/28/2018    Chronic anticoagulation    Otalgia, right ear 12/31/2018    Otalgia, right    Otalgia, unspecified ear 12/31/2018    Otalgia    Other abnormality of red blood cells 11/08/2022    Elevated MCV    Other conditions influencing health status 11/22/2021    Oral disorder    Other fatigue 03/19/2020    Fatigue    Other fecal abnormalities 06/29/2018    Dark stools    Other specified abnormal findings of blood chemistry 11/16/2022    Low vitamin D level    Other specified abnormal findings of blood chemistry 02/21/2022    Abnormal liver function test    Other specified diseases of intestine     Small intestinal bacterial overgrowth (SIBO)    Other specified symptoms and signs involving the circulatory and respiratory systems 11/17/2017    Abnormal finding of lung    Personal history of diseases of the blood and blood-forming organs and certain disorders involving the immune mechanism 03/18/2020    History of thrombocytosis    Personal history of diseases of the blood and blood-forming organs and certain disorders involving the  immune mechanism 03/18/2020    History of thrombocytosis    Personal history of other specified conditions 12/20/2022    History of nausea    Personal history of other specified conditions 12/20/2022    History of abdominal pain    Pharyngoesophageal dysphagia 09/09/2023    Pulmonary nodule 09/09/2023    Pure hypercholesterolemia, unspecified 11/08/2022    Elevated LDL cholesterol level    Pure hyperglyceridemia 11/16/2022    High triglycerides    Rash and other nonspecific skin eruption 12/30/2020    Rash    Toxic effect of venom of wasps, accidental (unintentional), initial encounter 09/18/2017    Wasp sting    Unspecified abnormal findings in urine 03/04/2022    Urine abnormality    Unspecified menopausal and perimenopausal disorder 01/23/2018    Menopausal and postmenopausal disorder   [2]   Past Surgical History:  Procedure Laterality Date    CATARACT EXTRACTION  01/08/2018    Cataract Surgery    CT ABDOMEN PELVIS ANGIOGRAM W AND/OR WO IV CONTRAST  6/12/2018    CT ABDOMEN PELVIS ANGIOGRAM W AND/OR WO IV CONTRAST 6/12/2018 [facility]    CT ABDOMEN PELVIS ANGIOGRAM W AND/OR WO IV CONTRAST  6/14/2018    CT ABDOMEN PELVIS ANGIOGRAM W AND/OR WO IV CONTRAST 6/14/2018 [facility]    IR ANGIOGRAM INFERIOR EPIGASTRIC PELVIC  6/14/2018    IR ANGIOGRAM INFERIOR EPIGASTRIC PELVIC 6/14/2018 [facility]    IR EMBOLIZATION LYMPH NODE Bilateral 6/14/2018    IR EMBOLIZATION LYMPH NODE 6/14/2018 [facility]    IR EMBOLIZATION LYMPH NODE Bilateral 6/14/2018    IR EMBOLIZATION LYMPH NODE 6/14/2018 [facility]    OTHER SURGICAL HISTORY  03/07/2018    Programming And Iterative Adjustment Of Implantable Loop Recorder    OTHER SURGICAL HISTORY  11/08/2022    Esophagogastroduodenoscopy   [3]   Social History  Tobacco Use    Smoking status: Never     Passive exposure: Never    Smokeless tobacco: Never   Vaping Use    Vaping status: Never Used   Substance Use Topics    Alcohol use: Not Currently    Drug use: Never   [4]    Family History  Problem Relation Name Age of Onset    Coronary artery disease Mother      Atrial fibrillation Mother      Heart failure Mother      Hypertension Mother      Coronary artery disease Father      Other (CVA) Father      Heart failure Father      Hypertension Father      Other (Mitral valve replacement) Father      Sleep apnea Father      Sleep apnea Sister      Polymyalgia rheumatica Brother      Sleep apnea Brother      No Known Problems Son Akin     Coronary artery disease Other Multiple Family Members     Other (Diabetes mellitus) Other Family History     Lung cancer Other Family History     Brain cancer Other Family History     Pancreatic cancer Other Family History     Throat cancer Other Family History    [5]   Allergies  Allergen Reactions    Bee Venom Protein (Honey Bee) Unknown    Pollen Extracts Cough

## 2025-06-23 ENCOUNTER — HOSPITAL ENCOUNTER (OUTPATIENT)
Dept: CARDIOLOGY | Facility: CLINIC | Age: 75
Discharge: HOME | End: 2025-06-23
Payer: MEDICARE

## 2025-06-23 DIAGNOSIS — I48.19 PERSISTENT ATRIAL FIBRILLATION (MULTI): ICD-10-CM

## 2025-06-23 PROCEDURE — 93298 REM INTERROG DEV EVAL SCRMS: CPT

## 2025-06-26 DIAGNOSIS — J31.0 RHINITIS, UNSPECIFIED TYPE: ICD-10-CM

## 2025-06-26 RX ORDER — FLUTICASONE PROPIONATE 50 MCG
2 SPRAY, SUSPENSION (ML) NASAL DAILY
Qty: 48 G | Refills: 3 | Status: SHIPPED | OUTPATIENT
Start: 2025-06-26

## 2025-07-15 NOTE — PROGRESS NOTES
[facility] Sleep Medicine  [address]  [facility]  [address]  [facility] [id number]     [facility]  Follow Up Visit Note  Virtual or Telephone Consent    An interactive audio and video telecommunication system which permits real time communications between the patient (at the originating site) and provider (at the distant site) was utilized to provide this telehealth service.   Verbal consent was requested and obtained from Javier Calderón on this date, 07/24/25 for a telehealth visit and the patient's location was confirmed at the time of the visit.       Subjective   Patient ID: Javier Calderón is a 75 y.o. female with past medical history significant for A-Fib, A flutter, and  OBSTRUCTIVE SLEEP APNEA.      7/24/2025: UPDATED: The patient has a virtual visit with me for follow-up of SHANE on PAP to reevaluate PAP compliance. She is taking 250 mg of magnesium to help her sleep well. She has no issues with his pap and no problems communicating with her DME. She reports that her sleep quality improved, as well as her life quality. Her over 4-hour pap compliance rate was 93  %, and her ESS  is  4 today.         1/21/25: The patient had a phone visit with me to evaluate her Sleep disturbance. Her over 4 hours pap compliance rate was  97 %, and Her ESS  is 2  today. She is taking Melatonin 10 mg because Doxepin caused her constipation. She reports waking up several times a night, but her body is doing that way. She is energetic.      12/26/24: The patient reported taking 0.3 to 0.4 mL of the doxepin liquid, which equals 3 to 4 mg of doxepin, 30 minutes before bedtime. She reports that although she woke up some, she still had a pleasant sleep. There were no side effects that I could tell, and no drowsiness during the day. She reports that it was a night trial, but so far, so good, and she is glad we found something  that might work.      11/21/24: UPDATED: The patient returned to the clinic to review her initial pap compliance. Her over 4 hours pap compliance rate was  97 %, and Her ESS  is 9  today. She c/o of still waking up during the night; she started taking Melatonin 3-5 mg, which made her extend to wake her up for 1.5 hours. I also instructed her to get 10 mg of melatonin 10 mg to prolong their sleep and to change the heating program to make her comfortable. The [facility] liaison, Yanique, provided her with an F30 small mask to try; she felt comfortable with it.      9/25/2024: The patient had a virtual visit with me for comprehensive sleep medicine evaluation due to sleep apnea to review her MILD sleep study to treat her MILD  sleep apnea. The desensitization strategy, 30-day free mask return policy, and insurance requirements are all discussed with the patient. The patient verbalized understanding it. Her ESS: 15, and ROCHELLE  is 21 today.     HPI  Patient had been having these symptoms for the past 10 years. Patient had PSG in 2024 which showed SHANE but no CPAP started yet.        SLEEP STUDY HISTORY: (personally reviewed raw data such as interpretation report, data sheet, hypnogram, and titration table if available and applicable)  6/28/24: BMI: 19.2, RDI 3%: 13.4/hr, RDI 4%: 7/hr, Sadiq: 85%, < 88%: 2.5 minutes     SLEEP-WAKE SCHEDULE  Bedtime: 8 PM on weekdays, same on weekends  Subjective sleep latency: 20-60 minutes  Problems falling asleep: No  Number of awakenings: 4-5 times per night spontaneously for unknown reasons  Falls back asleep in  minutes  Problems staying asleep: Yes  Final wake time: 4 AM on weekdays, same on weekends  Shift work: No  Naps: No  Average sleep duration (excluding naps): 6-7 hours     SLEEP ENVIRONMENT  Sleep location: bed   Sleep status: sleeps alone  Room is dark:  Yes  Room is quiet: Yes  Room is cool: Yes  Bed comfort: good     SLEEP HABITS:   Activities before bedtime: read a  book  Activities in bed: no  Preferred sleep position: right side     SLEEP ROS: (after cpap)  Night symptoms: Denies for snoring, witnessed apnea, nasal congestion , mouth breathing, nocturnal cough, and nocturia  Morning symptoms: Denies for unrefreshing sleep,  still + morning dry mouth, but no sleep inertia  Daytime symptoms Denies for excessive daytime sleepiness, fatigue, and irritability in daytime  Hypersomnia / narcolepsy symptoms: Patient denies symptoms of a hypersomnolence disorder such as sleep paralysis, sleep-related hallucinations, and cataplexy.   RLS symptoms: Patient admits having urge to move legs that occurs at rest (sitting, getting into bed, or lying n bed), worse in the evening, and relieved temporarily with movement.   Frequency of RLS symptoms: no more after cpap  RLS symptoms affect sleep onset: no  Movements in sleep: Delines for frequent leg kicks / jerks at night while asleep  Parasomnia symptoms: Patient denies symptoms of parasomnia such as sleepwalking, sleeptalking, sleep-eating, acting out dreams, and nightmares.      WEIGHT: stable     REVIEW OF SYSTEMS: All other systems have been reviewed and are negative.     PERTINENT SOCIAL HISTORY:  Occupation: retired RN  Smoking: No   ETOH: No   Marijuana: No   Caffeine: No  Sleep aids: No   Claustrophobia: No      PERTINENT PAST SURGICAL HISTORY:  non-contributory     PERTINENT FAMILY HISTORY:  sleep apnea- father, brother, sisters     Active Problems, Allergy List, Medication List, and PMH/PSH/FH/Social Hx have been reviewed and reconciled in chart. No significant changes unless documented in the pertinent chart section. Updates made when necessary.     REVIEW OF SYSTEMS  All other systems have been reviewed and are negative.      ALLERGIES  Allergies[1]    MEDICATIONS  Current Medications[2]      Objective   There were no vitals filed for this visit.     Physical Exam  Constitutional: Awake, not in distress  Psych: alert and oriented to  time, place, and person    PAP Adherence:  DURABLE MEDICAL EQUIPMENT COMPANY: [employer]  Machine: THERAPY: RESMED AIRSENSE 11 PRESSURE SETTINGS: 4 cm H2O - 10 cm H2O  Mask:  P10  Issues with therapy: ISSUES WITH THERAPY: Mask leak  Benefits with PAP: PERCEIVED BENEFITS OF PAP: refreshing sleep reduced daytime sleepiness decreased or no snoring better sleep quality    A PAP adherence download was obtained and data was reviewed personally today in clinic. (see scanned document in EPIC)         Assessment/Plan   Javier Calderón is a 75 y.o. female presents today in [facility] with the following problems:    # OBSTRUCTIVE SLEEP APNEA :  -Great compliance, continue 4-10  cmH20 auto CPAP and renew annual supplies through [employer].  -[employer] liagela Chanel provide her a F30 I small full face sample of mask to try in the clinic, she report being comfortable with it.    -Sleep apnea and PAP therapy education were provided at length in the clinic today. Javier Calderón verbalized understanding.  -Emphasized diet, exercise, and weight loss in the clinic, as were non-supine sleep, avoiding alcohol in the late evening, and driving or operating heavy machinery when sleepy.  -Javier Calderónverbalizes understanding of the above instructions and risks.     # CHRONIC SLEEP MAINTENANCE INSOMNIA:  -Taking Mg 250 mg to induce sleep.  -Sleep hygiene discuss in the clinic.     # ATRIAL FIBRILLATION:  -Denies headache, palpitation, and syncope in the clinic.  -Follows with PCP/ Cardiology     # XEROSTOMIA:  -Instruct Javier Calderónto purchase the Biotene gel to ease the dry mouth symptom,     # RESTLESS LEG SYNDROME:   -no more      Follow up 1 year    All of patient's questions were answered. She verbalizes understanding and agreement with my assessment and plan.    Please excuse any errors in grammar or translation related to dictation.           [1]    Allergies  Allergen Reactions    Bee Venom Protein (Honey Bee) Unknown    Erythritol Diarrhea     Intolerance to all sugar alcohol , developed  SIBO    Maltitol Diarrhea     Intolerance developed SIBO    Pollen Extracts Cough    Sorbitol Diarrhea     Developed SIBO    Xylitol Diarrhea     Developed SIBO   [2]   Current Outpatient Medications   Medication Sig Dispense Refill    ascorbic acid,sod/zinc gluc,ox (ZINC AND C ORAL) Take by mouth.      aspirin 81 mg EC tablet Take 1 tablet (81 mg) by mouth once daily.      azelastine (Astelin) 137 mcg (0.1 %) nasal spray Administer 1 spray into each nostril 2 times a day. Use in each nostril as directed 30 mL 12    b complex 0.4 mg tablet Take 1 tablet by mouth once daily.      budesonide-formoteroL (Symbicort) 160-4.5 mcg/actuation inhaler Inhale 2 puffs 2 times a day. Rinse mouth with water after use to reduce aftertaste and incidence of candidiasis. Do not swallow. 10.2 g 5    calcium carbonate 600 mg calcium (1,500 mg) tablet Take 1 tablet (1,500 mg) by mouth once daily.      cholecalciferol (Vitamin D-3) 25 MCG (1000 UT) tablet Take as directed.      denosumab (Prolia) 60 mg/mL syringe Inject 1 mL (60 mg total) under the skin every 6 months. 1 mL 1    estradiol (Estrace) 0.01 % (0.1 mg/gram) vaginal cream Discard the applicator and apply a pea sized amount to the vaginal opening and just inside the vagina 3 times/week 42.5 g 1    estradiol (Estrace) 0.01 % (0.1 mg/gram) vaginal cream Apply a blueberry sized amount around the vaginal opening and just inside the vagina three times a week 42.5 g 3    fluticasone (Cutivate) 0.05 % cream Apply topically if needed (rash). Apply inch. 60 g 0    fluticasone (Flonase) 50 mcg/actuation nasal spray USE 2 SPRAYS IN EACH NOSTRIL EVERY DAY 48 g 3    magnesium 250 mg tablet Take by mouth.      MELATONIN ORAL Take by mouth. (Patient not taking: Reported on 6/2/2025)      MULTIVITAMIN ORAL Take 1 capsule by mouth once daily.       mupirocin (Bactroban) 2 % ointment Apply topically 3 times a day. Apply ointment topically to affected area.      nitroglycerin (Nitrostat) 0.4 mg SL tablet Place 1 tablet (0.4 mg) under the tongue every 5 minutes if needed for chest pain. For up to 3 doses as needed. Call 911 if pain persists. 25 tablet 0    triamcinolone (Kenalog) 0.1 % cream Apply 1 Application topically.       No current facility-administered medications for this visit.

## 2025-07-24 ENCOUNTER — OFFICE VISIT (OUTPATIENT)
Dept: SLEEP MEDICINE | Facility: CLINIC | Age: 75
End: 2025-07-24
Payer: MEDICARE

## 2025-07-24 DIAGNOSIS — G47.33 OBSTRUCTIVE SLEEP APNEA (ADULT) (PEDIATRIC): Primary | ICD-10-CM

## 2025-07-24 DIAGNOSIS — G47.33 OSA (OBSTRUCTIVE SLEEP APNEA): ICD-10-CM

## 2025-07-24 DIAGNOSIS — I48.92 ATRIAL FLUTTER, UNSPECIFIED TYPE (MULTI): ICD-10-CM

## 2025-07-24 DIAGNOSIS — I48.91 ATRIAL FIBRILLATION, UNSPECIFIED TYPE (MULTI): ICD-10-CM

## 2025-07-24 PROCEDURE — 1159F MED LIST DOCD IN RCRD: CPT

## 2025-07-24 PROCEDURE — 99213 OFFICE O/P EST LOW 20 MIN: CPT | Mod: 95

## 2025-07-24 PROCEDURE — 99213 OFFICE O/P EST LOW 20 MIN: CPT

## 2025-07-24 PROCEDURE — 1160F RVW MEDS BY RX/DR IN RCRD: CPT

## 2025-07-24 PROCEDURE — 1036F TOBACCO NON-USER: CPT

## 2025-07-24 ASSESSMENT — SLEEP AND FATIGUE QUESTIONNAIRES
HOW LIKELY ARE YOU TO NOD OFF OR FALL ASLEEP WHEN YOU ARE A PASSENGER IN A CAR FOR AN HOUR WITHOUT A BREAK: WOULD NEVER DOZE
HOW LIKELY ARE YOU TO NOD OFF OR FALL ASLEEP WHILE SITTING AND READING: SLIGHT CHANCE OF DOZING
HOW LIKELY ARE YOU TO NOD OFF OR FALL ASLEEP IN A CAR, WHILE STOPPED FOR A FEW MINUTES IN TRAFFIC: WOULD NEVER DOZE
HOW LIKELY ARE YOU TO NOD OFF OR FALL ASLEEP WHILE SITTING QUIETLY AFTER LUNCH WITHOUT ALCOHOL: WOULD NEVER DOZE
HOW LIKELY ARE YOU TO NOD OFF OR FALL ASLEEP WHILE WATCHING TV: SLIGHT CHANCE OF DOZING
ESS-CHAD TOTAL SCORE: 4
HOW LIKELY ARE YOU TO NOD OFF OR FALL ASLEEP WHILE LYING DOWN TO REST IN THE AFTERNOON WHEN CIRCUMSTANCES PERMIT: SLIGHT CHANCE OF DOZING
HOW LIKELY ARE YOU TO NOD OFF OR FALL ASLEEP WHILE SITTING AND TALKING TO SOMEONE: WOULD NEVER DOZE
SITING INACTIVE IN A PUBLIC PLACE LIKE A CLASS ROOM OR A MOVIE THEATER: SLIGHT CHANCE OF DOZING

## 2025-07-24 NOTE — PATIENT INSTRUCTIONS
[facility] Sleep Medicine  [address]  [facility]  [address]  [facility] [address]       Thank you for coming to the Sleep Medicine Clinic today! Your sleep medicine provider today was: DUNIA Masters Below is a summary of your treatment plan, patient education, other important information, and our contact numbers.    Dear Ms Javier Schmidtyasmine       Your Sleep Provider Today: DUNIA Masters  Your Primary Care Physician: Andrew Ayoub MD     Diagnosis: OBSTRUCTIVE SLEEP APNEA       Thank you for visiting  Sleep Medicine Clinic !     1.You are doing great, we ordered the annual supplies for you. Feel free to contact your DME company to get new supplies.    2. Please do not drive when you are sleepy and continue practicing the sleep hygiene as discussed in clinic.    3. FOR QUESTIONS AND CONCERNS:   a) : In case of problems with machine or mask interface, please contact your DME company first. DME is the company who provides you the machine and/or PAP supplies / accessories. If [employer] is your DME, you can reach them at [phone].   b): Please call my office with issues or questions: [phone] ([place]); [phone] ([facility]); [phone] ([facility])    In the event that you are running more than 10 minutes late to your appointment, I will kindly ask you to reschedule. Thanks.      TREATMENT PLAN     - Continue current machine settings. Continue using machine every night all night.   - Renew PAP supplies. Order placed and sent to DME.    Follow-up Appointment:   Follow-up in 1 year.    PATIENT EDUCATION     OBSTRUCTIVE SLEEP APNEA (SHANE) is a sleep disorder where your upper airway muscles relax during sleep and the airway intermittently and repetitively narrows and collapses leading to partially blocked airway (hypopnea) or completely blocked airway (apnea) which, in turn, can disrupt  breathing in sleep, lower oxygen levels while you sleep and cause night time wakings. Because both apnea and hypopnea may cause higher carbon dioxide or low oxygen levels, untreated SHANE can lead to heart arrhythmia, elevation of blood pressure, and make it harder for the body to consolidate memory and facilitate metabolism (leading to higher blood sugars at night). Frequent partial arousals occur during sleep resulting in sleep deprivation and daytime sleepiness. SHANE is associated with an increased risk of cardiovascular disease, stroke, hypertension, and insulin resistance. Moreover, untreated SHANE with excessive daytime sleepiness can increase the risk of motor vehicular accidents.    Below are conservative strategies for SHANE regardless of SHANE severity are:   Positional therapy - Avoid sleeping on your back.   Healthy diet and regular exercise to optimize weight is highly encouraged.   Avoid alcohol late in the evening and sedative-hypnotics as these substances can make sleep apnea worse.   Improve breathing through the nose with intranasal steroid spray, saline rinse, or antihistamines    Safety: Avoid driving vehicle and operating heavy equipment while sleepy. Drowsy driving may lead to life-threatening motor vehicle accidents. A person driving while sleepy is 5 times more likely to have an accident. If you feel sleepy, pull over and take a short power nap (sleep for less than 30 minutes). Otherwise, ask somebody to drive you.    Treatment options for sleep apnea include weight management, positional therapy, Positive Airway Therapy (PAP) therapy, oral appliance therapy, hypoglossal nerve stimulator (Inspire) and select airway surgeries.    Starting Positive Airway Pressure (PAP): You were ordered a device to wear when you sleep called PAP (Positive Airway Pressure) to treat your sleep apnea. The order will be submitted to a durable medical equipment ([employer]) company who will arrange setting you up with the device.  They will provide all the necessary equipment and discuss use and maintenance of the device with you as well as mask fitting and process of replacing / renewing PAP supplies or accessories. Once you get the machine, please start using it immediately. You may not be successful right away and that is okay. [place] be certain that you keep trying nightly and reach out to DME if you are struggling or having issues with machine usage.     *Please follow-up with me in 1-2 months of starting CPAP to see how well it is working for you and to do some troubleshooting if needed. Also, please bring all PAP equipment with you to follow up appointments unless told otherwise.     Important things to keep in mind as you start PAP:  Insurance will monitor your usage during the first 90 days. You should use your PAP - all night, every night, and including all naps (especially if naps are more than 30 minutes) for your health. The bare minimum is to use your PAP device while sleeping for at least 4 hours per day at least 5-6 days per week.. Otherwise, your PAP device will be reclaimed by your DME company at 90 days.  There are many masks to choose from to wear with your PAP machine. If you are not comfortable with the first mask issued to you, call your DME company and ask for another option to try. You typically have a 30-day mask guarantee from the day you received your machine.   Discuss with your provider if you are having issues breathing with the machine or if the temperature or humidity feel uncomfortable.  Expect to have an adjustment period when you start your device. It helps to continuing wearing the machine every day for a period of time until you get more used to it. You can practice with wearing the mask alone if you need, then add in the PAP air pressure a few days later.   Reach out for help if you are struggling! The sleep medicine department can be reached at [phone]  We encourage you to download data  monitoring apps to your phone. For [employer] AirBook[employer]ottlesse 10/11 - MyAir shey. For [employer] - DreamMapper. Both apps are available in the Shey store for free and are a great tool to monitor your progress with your PAP device night to night.    Tips for success with PAP machine usage:  Comfortable and well-fitting mask  Appropriate pressure on the machine  Using humidification  Support from bed partner and clinical team      Maintaining your CPAP/BPAP device:    The humidification chamber (aka water tank or water chamber) needs to be filled with distilled water to prevent buildup of white deposits in the future. If you cannot find distilled water, you can use tap water but expect to have white deposits buildup seen after prolonged use with tap water. If you start seeing white deposits on the water chamber, you can clean it by filling it with equal parts of distilled white vinegar and water. Let the vinegar-water mixture sit for 2 hours, and then rinse it with running tap water. Clean with soap and water then let it dry.     You should try to keep your machine clean in order to work well. Here are some tips to clean PAP supplies / accessories:    Clean the humidification chamber (aka water tank) as well as your mask and tubing at least once a week with soap and water.   Alternatively, you can fill a sink or basin with warm water and add a little mild detergent, like Ivory dish soap. Gently wipe your supplies with the soapy water to free all the oils and dirt that may have collected. Once that's done, rinse these items with clean water until the soap is gone and let them air dry. You can hang your tubing over the curtain debby in your bathroom so that it dries.  The mask insert (part of the mask that has contact with your skin) needs to be cleaned with soap and water daily. Another option is to wipe them down with CPAP wipes or baby wipes.    You should replace your mask and tubing frequently in order to prevent  bacteria buildup, machine damage, and mask seal issues. The older the mask and hose, the high likelihood that there is bacteria buildup in it especially if they are not cleaned regularly. Dirty filters damage machines because build-up of dust and contaminants can cause machine to over-heat, and in time, damage the motor of machine. Cushions lose their seal over time as most masks are made of plastic and silicone while headgear is made of neoprene. These materials will break down with age and frequent use. Here is the recommended replacement schedule for PAP supplies / accessories:    Twice a month- disposable filters and cushions for nasal mask or nasal pillows.  Once a month- cushion for full face mask  Every 3 months- mask with headgear and PAP tubing (standard or heated hose)  Every 6 months- reusable filter, water chamber, and chin strap     Other useful information:    Some people do not put water in the tank while other people prefers to put water in the tank to prevent mouth dryness. Try to experiment to determine which is more comfortable for you.   In general, new machines have 2 years warranty on parts while health insurance allows you to have a new machine once every 5 years.     Common issues with PAP machine:    Mask gets dislodged when turning to the side: Consider getting a CPAP pillow or switching to a mask with hose on top.     Dry mouth:  Your machine has built-in humidifier that heats up the air to prevent dry mouth. It can be adjusted to your comfort. You can try that first and increase setting one level one night at a time to check which setting is comfortable and effective in lessening dry mouth. In some patients with heated hose, adjusting tube temperature to make air warmer can improve dry mouth. If dry mouth persists despite adjusting humidity or tube temperature setting, may apply OTC Biotene gel over the gums at bedtime.  If Biotene gel is not effective, consider trying XEROSTOM gel from  "[employer].  Also, eliminate or reduce dose of medications that can cause dry mouth if possible. Lastly, may try getting a separate room humidifier machine.    Airleaks: Please call DME as they may need to adjust your mask or refit you with a different kind or different size of mask. In addition, you can ask DME for tips on getting a good mask seal and mask fit.     Difficulty tolerating the mask: Contact your DME to try a different kind of mask and/or call office to get a referral to Sleep Psychologist for CPAP desensitization. CPAP desensitization technique is a set of strategies that helps patient cope with claustrophobia and anxiety related to wearing mask. Alternatively, we can do a daytime mini-sleep study called PAP-nap trial wherein you will try on different kinds of mask and the sleep technician will try different pressure settings on CPAP and BPAP machines to see which specific pressure is tolerable and comfortable for you.     Water droplets or moisture within the hose and/or mask: This is called rain-out and it is caused by condensation of too much heated humidity on the cooler walls of the hose. If you have rain-out, turn down humidity settings or get a heated hose. If you already have a heated hose, turn up the \"tube temperature\" of the heated hose. Alternatively, if you don't want to get a heated hose or warmer air, may wrap the CPAP hose with stockings to keep it somewhat warm. Also, you need to place the machine on the floor and lower the hose so that water won't travel upward towards your mask.     PAP desensitization techniques: If you have concerns about something being on your face at night, you can start by getting used to it before trying to sleep with it as follows:      Sit in a comfortable chair or bed. Connect the mask and hose to the CPAP/BPAP machine. Hold the mask on your face (without straps on) and turn on the machine. Practice breathing with the mask on while awake sitting and " watching television, reading, or performing a sedentary activity during the day for 5-10 minutes and then take it off.  If tolerated, try again and gradually build up to longer periods of time. If not tolerated, try and try again until it is more comfortable as you become more desensitized. If you are able to use it for at least 20-30 minutes, move unto the next step.     Sit in a comfortable chair or bed. Connect the mask and hose to the CPAP/BPAP machine. Strap the mask on your head and turn on the machine. Practice breathing with the mask and headgear on while awake sitting and watching television, reading, or performing a sedentary activity. Start with 5-10 minutes and gradually increasing time until you can wear it comfortably for at least 20-30 minutes, then move to the next step.    Take a shorter daytime nap with machine turned on while you are in a reclined position in bed, sofa, or recliner. Start with 5-10 minute nap and gradually increase up to 30 minutes. It is not important whether you fall asleep or not. The goal is to rest comfortably with PAP machine on.     Reintroduce PAP machine into nighttime sleep. You can begin using it a portion of the night and gradually increase up to entire night.     Proceed from one step to the next only when you are completely comfortable. If you feel any anxiety or discomfort, return to the previous step, then proceed again when comfortable.    Expect to “work” with your CPAP/BIPAP unit. It is important to try to relax when beginning CPAP/BIPAP therapy. Inhalation and exhalation should occur through the nose only. If you are unable to consistently breathe this way, do not panic or lose hope. There are other types of masks which allow you to breathe through your nose and/or your mouth. Also, in some patients, using intranasal steroid spray (e.g. Flonase or Nasocort or Fluticasone) 1 hour before bedtime and/or before putting on CPAP mask can help tolerate breathing  through the mask.    Don't give up after a few attempts--some patients adjust quickly, while some patients need 3-4 weeks (or sometimes even longer) to be accustomed to CPAP therapy.  Contact your sleep medicine specialist if you have a significant change in weight since this may affect your pressure.    You can also go to the following EDUCATION WEBSITES for further information:   American Academy of Sleep Medicine http://sleepeducation.org  National Sleep Foundation: https://sleepfoundation.org  American Sleep Apnea Association: https://www.sleepapnea.org (for patients with sleep apnea)  Narcolepsy Network: https://www.narcolepsynetwork.org (for patients with narcolepsy)  [employer]Mele[employer]: https://www.BookFreshlepsy.org (for patients with narcolepsy)  Hypersomnia Foundation: https://www.hypersomniafoundation.org (for patients with idiopathic hypersomnia)  RLS foundation: https://www.rls.org (for patients with restless leg syndrome)    IMPORTANT INFORMATION     Call 911 for medical emergencies.  Our offices are generally open from Monday-Friday, 8 am - 5 pm.   There are no supporting services by either the sleep doctors or their staff on weekends and Holidays, or after 5 PM on weekdays.   If you need to get in touch with me, you may either call my office number or you can use [employer]sm.  If a referral for a test, for CPAP, or for another specialist was made, and you have not heard about scheduling this within a week, please call scheduling at [phone] ([phone]).  If you are unable to make your appointment for clinic or an overnight study, kindly call the office or sleep testing center at least 48 hours in advance to cancel and reschedule.  If you are on CPAP, please bring your device's card and/or the device to each clinic appointment.   In case of problems with PAP machine or mask interface, please contact your [employer] (Durable Medical Equipment) company first. [employer] is the company who provides you the machine  and/or PAP supplies.       PRESCRIPTIONS     We require 7 days advanced notice for prescription refills. If we do not receive the request in this time, we cannot guarantee that your medication will be refilled in time.    IMPORTANT PHONE NUMBERS     Sleep Medicine Clinic Fax: [phone]  Appointments (for Pediatric Sleep Clinic): [phone] - option 1  Appointments (for Adult Sleep Clinic): [phone] - option 2  Appointments (For Sleep Studies): [phone] - option 3  Behavioral Sleep Medicine: [phone]  Sleep Surgery: [phone]  Nutrition Service: [phone]  Weight management clinics with endocrinology: [phone]  Bariatric Services: [phone] (includes weight loss medications and weight loss surgery)  [facility]: [phone] (offers holistic approaches to weight management)  ENT (Otolaryngology): [phone]  Headache Clinic (Neurology): [phone]  Neurology: [phone]  Psychiatry: [phone]  Pulmonary Function Testing (PFT) Center: [phone]  Pulmonary Medicine: [phone]  [place] ([employer]): [phone]      OUR SLEEP TESTING LOCATIONS     Our team will contact you to schedule your sleep study, however, you can contact us as follow:  Main Phone Line (scheduling only): [phone]), option 3  Adult and Pediatric Locations  [facility] (6 years and older): [facility] - 4th floor ([address] [place] [address]) After hours line: [phone]  [facility] (Main campus: All ages): [facility], 6th floor. After hours line: [phone]   [place] (5 years and older; younger considered on case-by-case basis): [address]; [address], [address]. Scheduling  After hours line: [phone]   [place] (6 years and older): [address]; [address]; [address]   [place] (6 years and older): [address] [facility], [address]  After hours  "line: [phone]   [place] (13 years and older) in [place]: [address], 2nd floor  After hours line: [phone]   [place] (13 year and older): [address]  After hours line: [phone]     Adult Only Locations:   [place] (18 years and older): [address] [facility], 2nd floor   [place] (18 years and older): [address] [facility]; 4th floor  After hours line: [phone]  [facility] (18 years and older) at [place]: [address] [facility]  After hours line: [phone]     CONTACTING YOUR SLEEP MEDICINE PROVIDER AND SLEEP TEAM      For issues with your machine or mask interface, please call your DME provider first. DME stands for durable medical company. DME is the company who provides you the machine and/or PAP supplies / accessories.   To schedule, cancel, or reschedule SLEEP STUDY APPOINTMENTS, please call the Main Phone Line at [phone] - option 3.   To schedule, cancel, or reschedule CLINIC APPOINTMENTS, you can do it in \"MyChart\", call [phone] to speak with my  (Naty Davison), or call the Main Phone Line at [phone]) - option 2  For CLINICAL QUESTIONS or MEDICATION REFILLS, please call direct line for Adult Sleep Nurses at [phone].   Lastly, you can also send a message directly to your provider through \"My Chart\", which is the email service through your  Records Account: [email or web address]       Here at [facility], we wish you a restful sleep!   "

## 2025-07-30 ENCOUNTER — OFFICE VISIT (OUTPATIENT)
Dept: URGENT CARE | Age: 75
End: 2025-07-30
Payer: MEDICARE

## 2025-07-30 VITALS
TEMPERATURE: 98.3 F | HEART RATE: 78 BPM | SYSTOLIC BLOOD PRESSURE: 104 MMHG | RESPIRATION RATE: 14 BRPM | OXYGEN SATURATION: 96 % | DIASTOLIC BLOOD PRESSURE: 65 MMHG

## 2025-07-30 DIAGNOSIS — H60.503 ACUTE OTITIS EXTERNA OF BOTH EARS, UNSPECIFIED TYPE: ICD-10-CM

## 2025-07-30 DIAGNOSIS — H66.90 ACUTE OTITIS MEDIA, UNSPECIFIED OTITIS MEDIA TYPE: Primary | ICD-10-CM

## 2025-07-30 PROCEDURE — 1159F MED LIST DOCD IN RCRD: CPT | Performed by: PERSONAL EMERGENCY RESPONSE ATTENDANT

## 2025-07-30 PROCEDURE — 1125F AMNT PAIN NOTED PAIN PRSNT: CPT | Performed by: PERSONAL EMERGENCY RESPONSE ATTENDANT

## 2025-07-30 PROCEDURE — 99213 OFFICE O/P EST LOW 20 MIN: CPT | Performed by: PERSONAL EMERGENCY RESPONSE ATTENDANT

## 2025-07-30 PROCEDURE — 99070 SPECIAL SUPPLIES PHYS/QHP: CPT | Performed by: PERSONAL EMERGENCY RESPONSE ATTENDANT

## 2025-07-30 PROCEDURE — 1036F TOBACCO NON-USER: CPT | Performed by: PERSONAL EMERGENCY RESPONSE ATTENDANT

## 2025-07-30 RX ORDER — AMOXICILLIN AND CLAVULANATE POTASSIUM 875; 125 MG/1; MG/1
875 TABLET, FILM COATED ORAL 2 TIMES DAILY
Qty: 20 TABLET | Refills: 0 | Status: SHIPPED | OUTPATIENT
Start: 2025-07-30

## 2025-07-30 RX ORDER — OFLOXACIN 3 MG/ML
1 SOLUTION/ DROPS OPHTHALMIC 4 TIMES DAILY
Qty: 5 ML | Refills: 0 | Status: SHIPPED | OUTPATIENT
Start: 2025-07-30 | End: 2025-07-31 | Stop reason: ALTCHOICE

## 2025-07-30 ASSESSMENT — ENCOUNTER SYMPTOMS
CONSTITUTIONAL NEGATIVE: 1
PSYCHIATRIC NEGATIVE: 1
GASTROINTESTINAL NEGATIVE: 1
RESPIRATORY NEGATIVE: 1
EYES NEGATIVE: 1
CARDIOVASCULAR NEGATIVE: 1
MUSCULOSKELETAL NEGATIVE: 1

## 2025-07-30 ASSESSMENT — PAIN SCALES - GENERAL: PAINLEVEL_OUTOF10: 6

## 2025-07-31 ENCOUNTER — PATIENT MESSAGE (OUTPATIENT)
Dept: PRIMARY CARE | Facility: CLINIC | Age: 75
End: 2025-07-31
Payer: MEDICARE

## 2025-07-31 DIAGNOSIS — Z86.69 HISTORY OF RECURRENT EAR INFECTION: Primary | ICD-10-CM

## 2025-07-31 RX ORDER — OFLOXACIN 3 MG/ML
10 SOLUTION AURICULAR (OTIC) DAILY
Qty: 0.35 ML | Refills: 0 | Status: SHIPPED | OUTPATIENT
Start: 2025-07-31 | End: 2025-08-01 | Stop reason: SDUPTHER

## 2025-07-31 NOTE — PROGRESS NOTES
Patient called and stating the pharmacist advised the prescription was written for ophthalmic use in the dosage likely need adjusted.  I reviewed the chart and noted the original prescription was for ophthalmic 1 drop 4 times a day x 5 days .  Based on diagnosis of outer ear infection, prescription was changed to ofloxacin otic, 10 drops in each ear once daily x 7 days.  Spoke to the patient about this change.  She understands and is agreeable.  All questions and concerns addressed.

## 2025-07-31 NOTE — PROGRESS NOTES
Subjective   Patient ID: Javier Calderón is a 75 y.o. female. They present today with a chief complaint of Earache (bilateral).    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  Recently she did not use her earplugs while swimming and stated that shortly thereafter she started to have bilateral ear pain.  This has been a problem for her in the past that has been eliminated when she does wear her earplugs.  She stated that she wanted to come in early to get ahead of it and was requesting ofloxacin drops.      Earache         Past Medical History  Allergies as of 07/30/2025 - Reviewed 07/30/2025   Allergen Reaction Noted    Bee venom protein (honey bee) Unknown 09/09/2023    Erythritol Diarrhea 06/02/2025    Maltitol Diarrhea 06/02/2025    Pollen extracts Cough 10/09/2024    Sorbitol Diarrhea 06/02/2025    Xylitol Diarrhea 06/02/2025       Prescriptions Prior to Admission[1]     Medical History[2]    Surgical History[3]     reports that she has never smoked. She has never been exposed to tobacco smoke. She has never used smokeless tobacco. She reports that she does not currently use alcohol. She reports that she does not use drugs.    Review of Systems  Review of Systems   Constitutional: Negative.    HENT:  Positive for ear pain.    Eyes: Negative.    Respiratory: Negative.     Cardiovascular: Negative.    Gastrointestinal: Negative.    Musculoskeletal: Negative.    Skin: Negative.    Psychiatric/Behavioral: Negative.     All other systems reviewed and are negative.                                 Objective    Vitals:    07/30/25 1457   BP: 104/65   Pulse: 78   Resp: 14   Temp: 36.8 °C (98.3 °F)   SpO2: 96%     No LMP recorded. Patient is postmenopausal.    Physical Exam  Vitals and nursing note reviewed.   Constitutional:       Appearance: Normal appearance. She is normal weight.   HENT:       Head: Normocephalic and atraumatic.      Left Ear: Tympanic membrane is erythematous.      Ears:      Comments: There is mild erythema noted in the external canals bilaterally with an erythematous left tympanic membrane.     Nose: Nose normal.     Cardiovascular:      Rate and Rhythm: Normal rate.   Pulmonary:      Effort: Pulmonary effort is normal.   Abdominal:      General: Abdomen is flat.   Genitourinary:     Comments: No CVA tenderness or pubic pain.    Musculoskeletal:         General: Normal range of motion.     Skin:     General: Skin is warm and dry.     Neurological:      General: No focal deficit present.      Mental Status: She is alert and oriented to person, place, and time.     Psychiatric:         Mood and Affect: Mood normal.         Behavior: Behavior normal.         Procedures    Point of Care Test & Imaging Results from this visit  No results found for this visit on 07/30/25.   Imaging  No results found.    Cardiology, Vascular, and Other Imaging  No other imaging results found for the past 2 days      Diagnostic study results (if any) were reviewed by Cuauhtemoc Fraga PA-C.    Assessment/Plan   Allergies, medications, history, and pertinent labs/EKGs/Imaging reviewed by Cuauhtemoc Fraga PA-C.     Medical Decision Making  75-year-old female who comes in today with a chief complaint of bilateral ear pain and irritation.  Patient is a frequent swimmer and stated that she did not use her earplugs as she normally does a couple of days ago.  She states that she used to have problems with ear infections previously in the past, which had stopped whenever she uses earplugs.  Upon examination, it was noted that she had mild erythema noted to the external canals bilaterally with an erythematous left tympanic membrane.  She stated that she presented today because she wanted to get ahead of it and was requesting ofloxacin drops for her ears.  Patient was concerned about the left tympanic membrane erythema  and was also requesting oral antibiotics for treatment.  She stated that she had some fullness that was going down her left jaw and was concerned that it was also internal as well as externally involving her left ear.  I agreed and will give the patient a prescription for ofloxacin as well as Augmentin.  Patient stable for discharge and request to go home.  Discharge instructions were given.  Patient is to return to the urgent care or emergency room with worsening of symptoms.    Orders and Diagnoses  Diagnoses and all orders for this visit:  Acute otitis media, unspecified otitis media type  -     ofloxacin (Ocuflox) 0.3 % ophthalmic solution; Administer 1 drop into both eyes 4 times a day for 5 days.  -     amoxicillin-clavulanate (Augmentin) 875-125 mg tablet; Take 1 tablet by mouth 2 times a day.  Acute otitis externa of both ears, unspecified type  -     ofloxacin (Ocuflox) 0.3 % ophthalmic solution; Administer 1 drop into both eyes 4 times a day for 5 days.      Medical Admin Record      Patient disposition: Home    Electronically signed by Cuauhtemoc Fraga PA-C  9:21 PM           [1] (Not in a hospital admission)  [2]   Past Medical History:  Diagnosis Date    Abnormal finding of lung 09/09/2023    Abnormal levels of other serum enzymes 03/08/2022    Elevated liver enzymes    Abnormal renal function 09/09/2023    Abnormal weight loss 09/27/2018    Weight loss    Aortic thrombus (Multi) 09/09/2023    Dark stools 09/09/2023    Diarrhea, unspecified 12/31/2018    Diarrhea    Disorder of kidney and ureter, unspecified 11/08/2022    Abnormal renal function    Disorder of teeth and supporting structures, unspecified 02/22/2019    Dental disorder    Elevated liver enzymes 09/09/2023    Encounter for general adult medical examination without abnormal findings 03/08/2022    Encounter for Medicare annual wellness exam    Encounter for immunization 03/08/2022    Need for pneumococcal vaccination    Encounter for other  screening for malignant neoplasm of breast 02/21/2020    Screening for malignant neoplasm of breast    Encounter for other screening for malignant neoplasm of breast 06/09/2022    Breast screening    Encounter for screening mammogram for malignant neoplasm of breast 02/14/2018    Visit for screening mammogram    Gastro-esophageal reflux disease without esophagitis 06/23/2020    Esophageal reflux    Hypo-osmolality and hyponatremia 09/25/2019    Hyponatremia    Injury of peritoneum, initial encounter 08/23/2019    Peritoneal hematoma    Long term (current) use of anticoagulants 09/28/2018    Chronic anticoagulation    Otalgia, right ear 12/31/2018    Otalgia, right    Otalgia, unspecified ear 12/31/2018    Otalgia    Other abnormality of red blood cells 11/08/2022    Elevated MCV    Other conditions influencing health status 11/22/2021    Oral disorder    Other fatigue 03/19/2020    Fatigue    Other fecal abnormalities 06/29/2018    Dark stools    Other specified abnormal findings of blood chemistry 11/16/2022    Low vitamin D level    Other specified abnormal findings of blood chemistry 02/21/2022    Abnormal liver function test    Other specified diseases of intestine     Small intestinal bacterial overgrowth (SIBO)    Other specified symptoms and signs involving the circulatory and respiratory systems 11/17/2017    Abnormal finding of lung    Personal history of diseases of the blood and blood-forming organs and certain disorders involving the immune mechanism 03/18/2020    History of thrombocytosis    Personal history of diseases of the blood and blood-forming organs and certain disorders involving the immune mechanism 03/18/2020    History of thrombocytosis    Personal history of other specified conditions 12/20/2022    History of nausea    Personal history of other specified conditions 12/20/2022    History of abdominal pain    Pharyngoesophageal dysphagia 09/09/2023    Pulmonary nodule 09/09/2023    Pure  hypercholesterolemia, unspecified 11/08/2022    Elevated LDL cholesterol level    Pure hyperglyceridemia 11/16/2022    High triglycerides    Rash and other nonspecific skin eruption 12/30/2020    Rash    Toxic effect of venom of wasps, accidental (unintentional), initial encounter 09/18/2017    Wasp sting    Unspecified abnormal findings in urine 03/04/2022    Urine abnormality    Unspecified menopausal and perimenopausal disorder 01/23/2018    Menopausal and postmenopausal disorder   [3]   Past Surgical History:  Procedure Laterality Date    CATARACT EXTRACTION  01/08/2018    Cataract Surgery    CT ABDOMEN PELVIS ANGIOGRAM W AND/OR WO IV CONTRAST  6/12/2018    CT ABDOMEN PELVIS ANGIOGRAM W AND/OR WO IV CONTRAST 6/12/2018 [facility]    CT ABDOMEN PELVIS ANGIOGRAM W AND/OR WO IV CONTRAST  6/14/2018    CT ABDOMEN PELVIS ANGIOGRAM W AND/OR WO IV CONTRAST 6/14/2018 [facility]    IR ANGIOGRAM INFERIOR EPIGASTRIC PELVIC  6/14/2018    IR ANGIOGRAM INFERIOR EPIGASTRIC PELVIC 6/14/2018 [facility]    IR EMBOLIZATION LYMPH NODE Bilateral 6/14/2018    IR EMBOLIZATION LYMPH NODE 6/14/2018 [facility]    IR EMBOLIZATION LYMPH NODE Bilateral 6/14/2018    IR EMBOLIZATION LYMPH NODE 6/14/2018 [facility]    OTHER SURGICAL HISTORY  03/07/2018    Programming And Iterative Adjustment Of Implantable Loop Recorder    OTHER SURGICAL HISTORY  11/08/2022    Esophagogastroduodenoscopy

## 2025-08-01 DIAGNOSIS — H60.503 ACUTE OTITIS EXTERNA OF BOTH EARS, UNSPECIFIED TYPE: ICD-10-CM

## 2025-08-01 RX ORDER — OFLOXACIN 3 MG/ML
10 SOLUTION AURICULAR (OTIC) DAILY
Qty: 0.35 ML | Refills: 0 | Status: SHIPPED | OUTPATIENT
Start: 2025-08-01 | End: 2025-08-08

## 2025-08-04 ENCOUNTER — HOSPITAL ENCOUNTER (OUTPATIENT)
Dept: RADIOLOGY | Facility: CLINIC | Age: 75
Discharge: HOME | End: 2025-08-04
Payer: MEDICARE

## 2025-08-04 DIAGNOSIS — Z12.31 SCREENING MAMMOGRAM FOR BREAST CANCER: ICD-10-CM

## 2025-08-04 PROCEDURE — 77063 BREAST TOMOSYNTHESIS BI: CPT | Performed by: RADIOLOGY

## 2025-08-04 PROCEDURE — 77067 SCR MAMMO BI INCL CAD: CPT | Performed by: RADIOLOGY

## 2025-08-04 PROCEDURE — 77067 SCR MAMMO BI INCL CAD: CPT

## 2025-08-13 ENCOUNTER — APPOINTMENT (OUTPATIENT)
Dept: OTOLARYNGOLOGY | Facility: CLINIC | Age: 75
End: 2025-08-13
Payer: MEDICARE

## 2025-08-13 VITALS — WEIGHT: 110 LBS | BODY MASS INDEX: 18.88 KG/M2

## 2025-08-13 DIAGNOSIS — J31.0 CHRONIC RHINITIS: ICD-10-CM

## 2025-08-13 DIAGNOSIS — H69.90 DYSFUNCTION OF EUSTACHIAN TUBE, UNSPECIFIED LATERALITY: ICD-10-CM

## 2025-08-13 DIAGNOSIS — Z86.69 HISTORY OF OTITIS EXTERNA: Primary | ICD-10-CM

## 2025-08-13 PROCEDURE — 1036F TOBACCO NON-USER: CPT | Performed by: GENERAL PRACTICE

## 2025-08-13 PROCEDURE — 99204 OFFICE O/P NEW MOD 45 MIN: CPT | Performed by: GENERAL PRACTICE

## 2025-08-13 PROCEDURE — 1159F MED LIST DOCD IN RCRD: CPT | Performed by: GENERAL PRACTICE

## 2025-08-13 RX ORDER — MUPIROCIN 20 MG/G
OINTMENT TOPICAL 2 TIMES DAILY
Qty: 22 G | Refills: 2 | Status: SHIPPED | OUTPATIENT
Start: 2025-08-13 | End: 2025-08-27

## 2025-08-13 RX ORDER — TRIAMCINOLONE ACETONIDE 55 UG/1
2 SPRAY, METERED NASAL DAILY
Qty: 16.5 G | Refills: 3 | Status: SHIPPED | OUTPATIENT
Start: 2025-08-13 | End: 2026-08-13

## 2025-08-20 ENCOUNTER — CLINICAL SUPPORT (OUTPATIENT)
Dept: AUDIOLOGY | Facility: CLINIC | Age: 75
End: 2025-08-20
Payer: MEDICARE

## 2025-08-20 DIAGNOSIS — H90.3 SENSORINEURAL HEARING LOSS (SNHL) OF BOTH EARS: Primary | ICD-10-CM

## 2025-08-20 PROCEDURE — 92557 COMPREHENSIVE HEARING TEST: CPT

## 2025-08-20 PROCEDURE — 92550 TYMPANOMETRY & REFLEX THRESH: CPT | Mod: 52

## 2025-09-03 DIAGNOSIS — R23.9 SKIN CHANGE: Primary | ICD-10-CM

## 2025-09-03 RX ORDER — TRIAMCINOLONE ACETONIDE 1 MG/G
1 CREAM TOPICAL 2 TIMES DAILY
Qty: 15 G | Refills: 0 | Status: SHIPPED | OUTPATIENT
Start: 2025-09-03 | End: 2025-10-03

## 2025-10-02 ENCOUNTER — APPOINTMENT (OUTPATIENT)
Dept: INFUSION THERAPY | Facility: CLINIC | Age: 75
End: 2025-10-02
Payer: MEDICARE

## 2025-10-15 ENCOUNTER — APPOINTMENT (OUTPATIENT)
Dept: NEUROLOGY | Facility: CLINIC | Age: 75
End: 2025-10-15
Payer: MEDICARE

## 2025-11-06 ENCOUNTER — APPOINTMENT (OUTPATIENT)
Dept: RHEUMATOLOGY | Facility: CLINIC | Age: 75
End: 2025-11-06
Payer: MEDICARE

## 2025-12-04 ENCOUNTER — APPOINTMENT (OUTPATIENT)
Dept: PRIMARY CARE | Facility: CLINIC | Age: 75
End: 2025-12-04
Payer: MEDICARE